# Patient Record
Sex: FEMALE | Race: BLACK OR AFRICAN AMERICAN | Employment: UNEMPLOYED | URBAN - METROPOLITAN AREA
[De-identification: names, ages, dates, MRNs, and addresses within clinical notes are randomized per-mention and may not be internally consistent; named-entity substitution may affect disease eponyms.]

---

## 2017-02-16 ENCOUNTER — HOSPITAL ENCOUNTER (OUTPATIENT)
Dept: LAB | Age: 82
Discharge: HOME | End: 2017-02-16
Payer: MEDICARE

## 2017-02-16 DIAGNOSIS — E78.5 HLD (HYPERLIPIDEMIA): ICD-10-CM

## 2017-02-16 DIAGNOSIS — E55.9 VITAMIN D DEFICIENCY: ICD-10-CM

## 2017-02-16 LAB
25(OH)D3 SERPL-MCNC: 10.2 NG/ML (ref 30–100)
ALBUMIN SERPL BCP-MCNC: 3.4 G/DL (ref 3.4–5)
ALBUMIN/GLOB SERPL: 0.8 {RATIO} (ref 0.8–1.7)
ALP SERPL-CCNC: 97 U/L (ref 45–117)
ALT SERPL-CCNC: 13 U/L (ref 13–56)
ANION GAP BLD CALC-SCNC: 7 MMOL/L (ref 3–18)
AST SERPL W P-5'-P-CCNC: 20 U/L (ref 15–37)
BASOPHILS # BLD AUTO: 0 K/UL (ref 0–0.1)
BASOPHILS # BLD: 1 % (ref 0–2)
BILIRUB SERPL-MCNC: 0.5 MG/DL (ref 0.2–1)
BUN SERPL-MCNC: 13 MG/DL (ref 7–18)
BUN/CREAT SERPL: 14 (ref 12–20)
CALCIUM SERPL-MCNC: 8.4 MG/DL (ref 8.5–10.1)
CHLORIDE SERPL-SCNC: 105 MMOL/L (ref 100–108)
CO2 SERPL-SCNC: 28 MMOL/L (ref 21–32)
CREAT SERPL-MCNC: 0.94 MG/DL (ref 0.6–1.3)
DIFFERENTIAL METHOD BLD: ABNORMAL
EOSINOPHIL # BLD: 0.2 K/UL (ref 0–0.4)
EOSINOPHIL NFR BLD: 8 % (ref 0–5)
ERYTHROCYTE [DISTWIDTH] IN BLOOD BY AUTOMATED COUNT: 15.1 % (ref 11.6–14.5)
GLOBULIN SER CALC-MCNC: 4.4 G/DL (ref 2–4)
GLUCOSE SERPL-MCNC: 77 MG/DL (ref 74–99)
HCT VFR BLD AUTO: 33.5 % (ref 35–45)
HGB BLD-MCNC: 11 G/DL (ref 12–16)
LYMPHOCYTES # BLD AUTO: 46 % (ref 21–52)
LYMPHOCYTES # BLD: 1.5 K/UL (ref 0.9–3.6)
MCH RBC QN AUTO: 29.3 PG (ref 24–34)
MCHC RBC AUTO-ENTMCNC: 32.8 G/DL (ref 31–37)
MCV RBC AUTO: 89.3 FL (ref 74–97)
MONOCYTES # BLD: 0.2 K/UL (ref 0.05–1.2)
MONOCYTES NFR BLD AUTO: 6 % (ref 3–10)
NEUTS SEG # BLD: 1.2 K/UL (ref 1.8–8)
NEUTS SEG NFR BLD AUTO: 39 % (ref 40–73)
PLATELET # BLD AUTO: 192 K/UL (ref 135–420)
PMV BLD AUTO: 11 FL (ref 9.2–11.8)
POTASSIUM SERPL-SCNC: 3.2 MMOL/L (ref 3.5–5.5)
PROT SERPL-MCNC: 7.8 G/DL (ref 6.4–8.2)
RBC # BLD AUTO: 3.75 M/UL (ref 4.2–5.3)
SODIUM SERPL-SCNC: 140 MMOL/L (ref 136–145)
TSH SERPL DL<=0.05 MIU/L-ACNC: 3.9 UIU/ML (ref 0.36–3.74)
WBC # BLD AUTO: 3.2 K/UL (ref 4.6–13.2)

## 2017-02-16 PROCEDURE — 85025 COMPLETE CBC W/AUTO DIFF WBC: CPT | Performed by: FAMILY MEDICINE

## 2017-02-16 PROCEDURE — 36415 COLL VENOUS BLD VENIPUNCTURE: CPT | Performed by: FAMILY MEDICINE

## 2017-02-16 PROCEDURE — 80053 COMPREHEN METABOLIC PANEL: CPT | Performed by: FAMILY MEDICINE

## 2017-02-16 PROCEDURE — 80061 LIPID PANEL: CPT | Performed by: FAMILY MEDICINE

## 2017-02-16 PROCEDURE — 82306 VITAMIN D 25 HYDROXY: CPT | Performed by: FAMILY MEDICINE

## 2017-02-16 PROCEDURE — 82172 ASSAY OF APOLIPOPROTEIN: CPT | Performed by: FAMILY MEDICINE

## 2017-02-16 PROCEDURE — 84443 ASSAY THYROID STIM HORMONE: CPT | Performed by: FAMILY MEDICINE

## 2017-02-19 LAB
APO B SERPL-MCNC: 130 MG/DL (ref 54–133)
CHOLEST SERPL-MCNC: 228 MG/DL (ref 100–199)
HDLC SERPL-MCNC: 79 MG/DL
LDLC SERPL CALC-MCNC: 124 MG/DL (ref 0–99)
LDLC/HDLC SERPL: 1.6 RATIO UNITS (ref 0–3.2)
NONHDLC SERPL-MCNC: 149 MG/DL (ref 0–129)
TRIGL SERPL-MCNC: 124 MG/DL (ref 0–149)

## 2017-03-01 ENCOUNTER — HOSPITAL ENCOUNTER (OUTPATIENT)
Dept: GENERAL RADIOLOGY | Age: 82
Discharge: HOME | End: 2017-03-01
Attending: INTERNAL MEDICINE
Payer: MEDICARE

## 2017-03-01 DIAGNOSIS — R10.9 ABDOMINAL CRAMPING: ICD-10-CM

## 2017-03-01 PROCEDURE — 74011000255 HC RX REV CODE- 255: Performed by: INTERNAL MEDICINE

## 2017-03-01 PROCEDURE — 74011000250 HC RX REV CODE- 250: Performed by: INTERNAL MEDICINE

## 2017-03-01 PROCEDURE — 74245 XR UPPER GI/SMALL BOWEL: CPT

## 2017-03-01 RX ADMIN — BARIUM SULFATE 100 ML: 980 POWDER, FOR SUSPENSION ORAL at 12:19

## 2017-03-01 RX ADMIN — ANTACID/ANTIFLATULENT 4 G: 380; 550; 10; 10 GRANULE, EFFERVESCENT ORAL at 12:19

## 2017-03-01 RX ADMIN — BARIUM SULFATE 125 G: 960 POWDER, FOR SUSPENSION ORAL at 12:20

## 2017-05-16 ENCOUNTER — APPOINTMENT (OUTPATIENT)
Dept: GENERAL RADIOLOGY | Age: 82
DRG: 391 | End: 2017-05-16
Attending: FAMILY MEDICINE
Payer: MEDICARE

## 2017-05-16 ENCOUNTER — APPOINTMENT (OUTPATIENT)
Dept: CT IMAGING | Age: 82
DRG: 391 | End: 2017-05-16
Attending: FAMILY MEDICINE
Payer: MEDICARE

## 2017-05-16 ENCOUNTER — HOSPITAL ENCOUNTER (INPATIENT)
Age: 82
LOS: 6 days | Discharge: NURSING, REHAB OR LONG-TERM CARE | DRG: 391 | End: 2017-05-22
Attending: EMERGENCY MEDICINE | Admitting: FAMILY MEDICINE
Payer: MEDICARE

## 2017-05-16 DIAGNOSIS — D72.819 LEUKOPENIA, UNSPECIFIED TYPE: Primary | ICD-10-CM

## 2017-05-16 DIAGNOSIS — R54 FRAILTY: Chronic | ICD-10-CM

## 2017-05-16 DIAGNOSIS — R41.82 ALTERED MENTAL STATUS, UNSPECIFIED: ICD-10-CM

## 2017-05-16 DIAGNOSIS — R10.13 ABDOMINAL PAIN, EPIGASTRIC: ICD-10-CM

## 2017-05-16 DIAGNOSIS — E87.6 HYPOKALEMIA: ICD-10-CM

## 2017-05-16 DIAGNOSIS — R10.9 ABDOMINAL PAIN, UNSPECIFIED LOCATION: ICD-10-CM

## 2017-05-16 DIAGNOSIS — N30.00 ACUTE CYSTITIS WITHOUT HEMATURIA: ICD-10-CM

## 2017-05-16 PROBLEM — N39.0 UTI (URINARY TRACT INFECTION): Status: ACTIVE | Noted: 2017-05-16

## 2017-05-16 LAB
ALBUMIN SERPL BCP-MCNC: 2.9 G/DL (ref 3.4–5)
ALBUMIN/GLOB SERPL: 0.7 {RATIO} (ref 0.8–1.7)
ALP SERPL-CCNC: 75 U/L (ref 45–117)
ALT SERPL-CCNC: 13 U/L (ref 13–56)
ANION GAP BLD CALC-SCNC: 3 MMOL/L (ref 3–18)
APPEARANCE UR: CLEAR
AST SERPL W P-5'-P-CCNC: 20 U/L (ref 15–37)
BACTERIA URNS QL MICRO: ABNORMAL /HPF
BASOPHILS # BLD AUTO: 0 K/UL (ref 0–0.1)
BASOPHILS # BLD: 0 % (ref 0–2)
BILIRUB SERPL-MCNC: 0.5 MG/DL (ref 0.2–1)
BILIRUB UR QL: NEGATIVE
BUN SERPL-MCNC: 9 MG/DL (ref 7–18)
BUN/CREAT SERPL: 13 (ref 12–20)
CALCIUM SERPL-MCNC: 8.3 MG/DL (ref 8.5–10.1)
CHLORIDE SERPL-SCNC: 108 MMOL/L (ref 100–108)
CK MB CFR SERPL CALC: 2.8 % (ref 0–4)
CK MB SERPL-MCNC: 4.4 NG/ML (ref 5–25)
CK SERPL-CCNC: 157 U/L (ref 26–192)
CO2 SERPL-SCNC: 30 MMOL/L (ref 21–32)
COLOR UR: YELLOW
CREAT SERPL-MCNC: 0.7 MG/DL (ref 0.6–1.3)
DIFFERENTIAL METHOD BLD: ABNORMAL
EOSINOPHIL # BLD: 0.2 K/UL (ref 0–0.4)
EOSINOPHIL NFR BLD: 6 % (ref 0–5)
EPITH CASTS URNS QL MICRO: ABNORMAL /LPF (ref 0–5)
ERYTHROCYTE [DISTWIDTH] IN BLOOD BY AUTOMATED COUNT: 15.6 % (ref 11.6–14.5)
GLOBULIN SER CALC-MCNC: 3.9 G/DL (ref 2–4)
GLUCOSE SERPL-MCNC: 92 MG/DL (ref 74–99)
GLUCOSE UR STRIP.AUTO-MCNC: NEGATIVE MG/DL
HCT VFR BLD AUTO: 29.1 % (ref 35–45)
HGB BLD-MCNC: 9.7 G/DL (ref 12–16)
HGB UR QL STRIP: NEGATIVE
KETONES UR QL STRIP.AUTO: NEGATIVE MG/DL
LEUKOCYTE ESTERASE UR QL STRIP.AUTO: ABNORMAL
LIPASE SERPL-CCNC: 134 U/L (ref 73–393)
LYMPHOCYTES # BLD AUTO: 48 % (ref 21–52)
LYMPHOCYTES # BLD: 1.4 K/UL (ref 0.9–3.6)
MCH RBC QN AUTO: 28.4 PG (ref 24–34)
MCHC RBC AUTO-ENTMCNC: 33.3 G/DL (ref 31–37)
MCV RBC AUTO: 85.3 FL (ref 74–97)
MONOCYTES # BLD: 0.3 K/UL (ref 0.05–1.2)
MONOCYTES NFR BLD AUTO: 11 % (ref 3–10)
NEUTS SEG # BLD: 1 K/UL (ref 1.8–8)
NEUTS SEG NFR BLD AUTO: 35 % (ref 40–73)
NITRITE UR QL STRIP.AUTO: NEGATIVE
PH UR STRIP: 6.5 [PH] (ref 5–8)
PLATELET # BLD AUTO: 187 K/UL (ref 135–420)
PMV BLD AUTO: 10.1 FL (ref 9.2–11.8)
POTASSIUM SERPL-SCNC: 3 MMOL/L (ref 3.5–5.5)
PROT SERPL-MCNC: 6.8 G/DL (ref 6.4–8.2)
PROT UR STRIP-MCNC: ABNORMAL MG/DL
RBC # BLD AUTO: 3.41 M/UL (ref 4.2–5.3)
RBC #/AREA URNS HPF: ABNORMAL /HPF (ref 0–5)
SODIUM SERPL-SCNC: 141 MMOL/L (ref 136–145)
SP GR UR REFRACTOMETRY: 1.01 (ref 1–1.03)
TROPONIN I SERPL-MCNC: <0.02 NG/ML (ref 0–0.04)
UROBILINOGEN UR QL STRIP.AUTO: 1 EU/DL (ref 0.2–1)
WBC # BLD AUTO: 2.9 K/UL (ref 4.6–13.2)
WBC URNS QL MICRO: ABNORMAL /HPF (ref 0–5)

## 2017-05-16 PROCEDURE — 87040 BLOOD CULTURE FOR BACTERIA: CPT | Performed by: FAMILY MEDICINE

## 2017-05-16 PROCEDURE — 99285 EMERGENCY DEPT VISIT HI MDM: CPT

## 2017-05-16 PROCEDURE — 96365 THER/PROPH/DIAG IV INF INIT: CPT

## 2017-05-16 PROCEDURE — 74011250636 HC RX REV CODE- 250/636: Performed by: FAMILY MEDICINE

## 2017-05-16 PROCEDURE — 74011636320 HC RX REV CODE- 636/320: Performed by: EMERGENCY MEDICINE

## 2017-05-16 PROCEDURE — 74177 CT ABD & PELVIS W/CONTRAST: CPT

## 2017-05-16 PROCEDURE — 36415 COLL VENOUS BLD VENIPUNCTURE: CPT | Performed by: FAMILY MEDICINE

## 2017-05-16 PROCEDURE — 74011000250 HC RX REV CODE- 250: Performed by: FAMILY MEDICINE

## 2017-05-16 PROCEDURE — 80053 COMPREHEN METABOLIC PANEL: CPT | Performed by: EMERGENCY MEDICINE

## 2017-05-16 PROCEDURE — 83690 ASSAY OF LIPASE: CPT | Performed by: EMERGENCY MEDICINE

## 2017-05-16 PROCEDURE — 85025 COMPLETE CBC W/AUTO DIFF WBC: CPT | Performed by: EMERGENCY MEDICINE

## 2017-05-16 PROCEDURE — 74011000258 HC RX REV CODE- 258: Performed by: EMERGENCY MEDICINE

## 2017-05-16 PROCEDURE — 81001 URINALYSIS AUTO W/SCOPE: CPT | Performed by: FAMILY MEDICINE

## 2017-05-16 PROCEDURE — 96361 HYDRATE IV INFUSION ADD-ON: CPT

## 2017-05-16 PROCEDURE — 74011250636 HC RX REV CODE- 250/636: Performed by: EMERGENCY MEDICINE

## 2017-05-16 PROCEDURE — 82550 ASSAY OF CK (CPK): CPT | Performed by: FAMILY MEDICINE

## 2017-05-16 PROCEDURE — 96375 TX/PRO/DX INJ NEW DRUG ADDON: CPT

## 2017-05-16 PROCEDURE — 74011250637 HC RX REV CODE- 250/637: Performed by: EMERGENCY MEDICINE

## 2017-05-16 PROCEDURE — 65270000029 HC RM PRIVATE

## 2017-05-16 PROCEDURE — 71010 XR CHEST SNGL V: CPT

## 2017-05-16 RX ORDER — CEFTRIAXONE 1 G/1
1 INJECTION, POWDER, FOR SOLUTION INTRAMUSCULAR; INTRAVENOUS
Status: DISCONTINUED | OUTPATIENT
Start: 2017-05-16 | End: 2017-05-16 | Stop reason: CLARIF

## 2017-05-16 RX ORDER — POTASSIUM CHLORIDE AND SODIUM CHLORIDE 450; 150 MG/100ML; MG/100ML
INJECTION, SOLUTION INTRAVENOUS CONTINUOUS
Status: DISCONTINUED | OUTPATIENT
Start: 2017-05-16 | End: 2017-05-18

## 2017-05-16 RX ORDER — MORPHINE SULFATE 2 MG/ML
2 INJECTION, SOLUTION INTRAMUSCULAR; INTRAVENOUS
Status: DISCONTINUED | OUTPATIENT
Start: 2017-05-16 | End: 2017-05-19

## 2017-05-16 RX ORDER — FAMOTIDINE 20 MG/50ML
20 INJECTION, SOLUTION INTRAVENOUS EVERY 12 HOURS
Status: DISCONTINUED | OUTPATIENT
Start: 2017-05-16 | End: 2017-05-16

## 2017-05-16 RX ORDER — MORPHINE SULFATE 2 MG/ML
2 INJECTION, SOLUTION INTRAMUSCULAR; INTRAVENOUS
Status: COMPLETED | OUTPATIENT
Start: 2017-05-16 | End: 2017-05-16

## 2017-05-16 RX ORDER — POTASSIUM CHLORIDE 20 MEQ/1
40 TABLET, EXTENDED RELEASE ORAL
Status: COMPLETED | OUTPATIENT
Start: 2017-05-16 | End: 2017-05-16

## 2017-05-16 RX ORDER — CEFTRIAXONE 1 G/1
1 INJECTION, POWDER, FOR SOLUTION INTRAMUSCULAR; INTRAVENOUS EVERY 24 HOURS
Status: DISCONTINUED | OUTPATIENT
Start: 2017-05-16 | End: 2017-05-16

## 2017-05-16 RX ORDER — HYDRALAZINE HYDROCHLORIDE 20 MG/ML
10 INJECTION INTRAMUSCULAR; INTRAVENOUS
Status: DISCONTINUED | OUTPATIENT
Start: 2017-05-16 | End: 2017-05-22 | Stop reason: HOSPADM

## 2017-05-16 RX ORDER — ONDANSETRON 2 MG/ML
4 INJECTION INTRAMUSCULAR; INTRAVENOUS
Status: COMPLETED | OUTPATIENT
Start: 2017-05-16 | End: 2017-05-16

## 2017-05-16 RX ADMIN — CEFTRIAXONE SODIUM 1 G: 1 INJECTION, POWDER, FOR SOLUTION INTRAMUSCULAR; INTRAVENOUS at 14:39

## 2017-05-16 RX ADMIN — ONDANSETRON 4 MG: 2 INJECTION INTRAMUSCULAR; INTRAVENOUS at 15:04

## 2017-05-16 RX ADMIN — SODIUM CHLORIDE 1000 ML: 900 INJECTION, SOLUTION INTRAVENOUS at 12:03

## 2017-05-16 RX ADMIN — CEFTRIAXONE 1 G: 1 INJECTION, POWDER, FOR SOLUTION INTRAMUSCULAR; INTRAVENOUS at 14:39

## 2017-05-16 RX ADMIN — SODIUM CHLORIDE AND POTASSIUM CHLORIDE: 4.5; 1.49 INJECTION, SOLUTION INTRAVENOUS at 23:16

## 2017-05-16 RX ADMIN — IOPAMIDOL 100 ML: 612 INJECTION, SOLUTION INTRAVENOUS at 20:32

## 2017-05-16 RX ADMIN — ONDANSETRON 4 MG: 2 SOLUTION INTRAMUSCULAR; INTRAVENOUS at 15:12

## 2017-05-16 RX ADMIN — FAMOTIDINE 20 MG: 10 INJECTION, SOLUTION INTRAVENOUS at 23:16

## 2017-05-16 RX ADMIN — POTASSIUM CHLORIDE 40 MEQ: 20 TABLET, EXTENDED RELEASE ORAL at 15:12

## 2017-05-16 RX ADMIN — Medication 2 MG: at 15:17

## 2017-05-16 NOTE — IP AVS SNAPSHOT
Current Discharge Medication List  
  
START taking these medications Dose & Instructions Dispensing Information Comments Morning Noon Evening Bedtime  
 bisacodyl 10 mg suppository Commonly known as:  DULCOLAX Your last dose was: Your next dose is:    
   
   
 Dose:  10 mg Insert 10 mg into rectum daily as needed. Quantity:  30 Suppository Refills:  0  
     
   
   
   
  
 dicyclomine 10 mg/5 mL Soln oral solution Commonly known as:  BENTYL Your last dose was: Your next dose is:    
   
   
 Dose:  10 mg Take 5 mL by mouth four (4) times daily. Quantity:  473 mL Refills:  0  
     
   
   
   
  
 docusate sodium 100 mg capsule Commonly known as:  Ramone Bingham Your last dose was: Your next dose is:    
   
   
 Dose:  100 mg Take 1 Cap by mouth two (2) times a day for 90 days. Hold for diarrhea Quantity:  60 Cap Refills:  0  
     
   
   
   
  
 erythromycin base 250 mg tablet Commonly known as:  E-MYCIN Your last dose was: Your next dose is:    
   
   
 Dose:  250 mg Take 1 Tab by mouth four (4) times daily. Quantity:  90 Tab Refills:  0  
     
   
   
   
  
 famotidine 20 mg tablet Commonly known as:  PEPCID Your last dose was: Your next dose is:    
   
   
 Dose:  20 mg Take 1 Tab by mouth daily. Quantity:  30 Tab Refills:  0 HYDROcodone-acetaminophen 5-325 mg per tablet Commonly known as:  Ambreen Dakconrad Your last dose was: Your next dose is:    
   
   
 Dose:  1 Tab Take 1 Tab by mouth every four (4) hours as needed. Max Daily Amount: 6 Tabs. Quantity:  40 Tab Refills:  0  
     
   
   
   
  
 levothyroxine 25 mcg tablet Commonly known as:  SYNTHROID Your last dose was: Your next dose is:    
   
   
 Dose:  25 mcg Take 1 Tab by mouth Daily (before breakfast). Quantity:  30 Tab Refills:  0 LORazepam 0.5 mg tablet Commonly known as:  ATIVAN Your last dose was: Your next dose is:    
   
   
 Dose:  0.5 mg Take 1 Tab by mouth three (3) times daily as needed. Max Daily Amount: 1.5 mg.  
 Quantity:  90 Tab Refills:  0  
     
   
   
   
  
 magnesium hydroxide 400 mg/5 mL suspension Commonly known as:  MILK OF MAGNESIA Your last dose was: Your next dose is:    
   
   
 Dose:  30 mL Take 30 mL by mouth daily as needed for Constipation. Quantity:  1 Bottle Refills:  0  
     
   
   
   
  
 ondansetron 4 mg disintegrating tablet Commonly known as:  ZOFRAN ODT Your last dose was: Your next dose is:    
   
   
 Dose:  4 mg Take 1 Tab by mouth every eight (8) hours as needed. Quantity:  90 Tab Refills:  0  
     
   
   
   
  
 polyethylene glycol 17 gram packet Commonly known as:  Anthony Spotted Your last dose was: Your next dose is:    
   
   
 Dose:  17 g Take 1 Packet by mouth daily. Quantity:  30 Packet Refills:  0  
     
   
   
   
  
 risperiDONE 0.25 mg tablet Commonly known as:  RisperDAL Your last dose was: Your next dose is:    
   
   
 Dose:  0.25 mg Take 1 Tab by mouth nightly. Quantity:  30 Tab Refills:  0 CONTINUE these medications which have NOT CHANGED Dose & Instructions Dispensing Information Comments Morning Noon Evening Bedtime  
 nitroglycerin 0.4 mg SL tablet Commonly known as:  NITROSTAT Your last dose was: Your next dose is:    
   
   
 Dose:  0.4 mg  
1 Tab by SubLINGual route every five (5) minutes as needed for Chest Pain for 3 doses. Quantity:  25 Tab Refills:  0 STOP taking these medications   
 furosemide 40 mg tablet Commonly known as:  LASIX  
   
  
 potassium chloride 20 mEq tablet Commonly known as:  K-DURKIMANIOR-CON  
   
  
 simvastatin 20 mg tablet Commonly known as:  ZOCOR  
   
  
 traMADol 50 mg tablet Commonly known as:  ULTRAM  
   
  
  
  
Where to Get Your Medications These medications were sent to [facility] [place], [address]  [address] , [address] [address] Phone:  [phone]  
  bisacodyl 10 mg suppository  
 dicyclomine 10 mg/5 mL Soln oral solution  
 docusate sodium 100 mg capsule  
 erythromycin base 250 mg tablet  
 famotidine 20 mg tablet  
 levothyroxine 25 mcg tablet  
 magnesium hydroxide 400 mg/5 mL suspension  
 ondansetron 4 mg disintegrating tablet  
 polyethylene glycol 17 gram packet  
 risperiDONE 0.25 mg tablet Information on where to get these meds will be given to you by the nurse or doctor. ! Ask your nurse or doctor about these medications HYDROcodone-acetaminophen 5-325 mg per tablet LORazepam 0.5 mg tablet

## 2017-05-16 NOTE — ED TRIAGE NOTES
Patient came into ER complaining of back pain and urinary incontinence, her primary Nurse caregiver , states patient urine is darker then usual and she has not want to drink water, just sodas and states when she gets this confusion it is a UTI. Patient also c/o right shoulder pain that is chronic.

## 2017-05-16 NOTE — IP AVS SNAPSHOT
Bianka Arroyo 
 
 
 [address] Patient: Diomedes Levine MRN: [id number] You are allergic to the following Allergen Reactions Ciprofloxacin Itching Recent Documentation Height Weight Breastfeeding? BMI Smoking Status 1.549 m 56.7 kg No 23.64 kg/m2 Former Smoker Emergency Contacts Name Discharge Info Relation Home Work Mobile Gabriela Mars 3142 CAREGIVER [3] Other Relative [6] [id number] Dalila Carrillo  Other Relative [6] [phone] About your hospitalization You were admitted on:  May 16, 2017 You last received care in the:  [facility] [address] You were discharged on:  May 22, 2017 Unit phone number:  [phone] Why you were hospitalized Your primary diagnosis was:  Not on File Your diagnoses also included:  Hypokalemia, Leukopenia, Uti (Urinary Tract Infection), Abdominal Pain, Abnormal Ct Of The Abdomen, Altered Mental Status, Unspecified, Acute Encephalopathy, Dementia Providers Seen During Your Hospitalizations Provider Role Specialty Primary office phone Ayush Augustin DO Attending Provider Emergency Medicine [phone] Payam Chavez MD Attending Provider General acute hospital [phone] Your Primary Care Physician (PCP) Primary Care Physician Office Phone Office Fax [phone] Desai,[facility], [address] [facility] [phone] Follow-up Information Follow up With Details Comments Contact Info Patient will be discharging to a [facility] or [employer]. Current Discharge Medication List  
  
START taking these medications Dose & Instructions Dispensing Information Comments Morning Noon Evening Bedtime  
 bisacodyl 10 mg suppository Commonly known as:  DULCOLAX Your last dose was: Your next dose is: Dose:  10 mg Insert 10 mg into rectum daily as needed. Quantity:  30 Suppository Refills:  0  
     
   
   
   
  
 dicyclomine 10 mg/5 mL Soln oral solution Commonly known as:  BENTYL Your last dose was: Your next dose is:    
   
   
 Dose:  10 mg Take 5 mL by mouth four (4) times daily. Quantity:  473 mL Refills:  0  
     
   
   
   
  
 docusate sodium 100 mg capsule Commonly known as:  Mikhail Stallion Your last dose was: Your next dose is:    
   
   
 Dose:  100 mg Take 1 Cap by mouth two (2) times a day for 90 days. Hold for diarrhea Quantity:  60 Cap Refills:  0  
     
   
   
   
  
 erythromycin base 250 mg tablet Commonly known as:  E-MYCIN Your last dose was: Your next dose is:    
   
   
 Dose:  250 mg Take 1 Tab by mouth four (4) times daily. Quantity:  90 Tab Refills:  0  
     
   
   
   
  
 famotidine 20 mg tablet Commonly known as:  PEPCID Your last dose was: Your next dose is:    
   
   
 Dose:  20 mg Take 1 Tab by mouth daily. Quantity:  30 Tab Refills:  0 HYDROcodone-acetaminophen 5-325 mg per tablet Commonly known as:  [place] Hurt Your last dose was: Your next dose is:    
   
   
 Dose:  1 Tab Take 1 Tab by mouth every four (4) hours as needed. Max Daily Amount: 6 Tabs. Quantity:  40 Tab Refills:  0  
     
   
   
   
  
 levothyroxine 25 mcg tablet Commonly known as:  SYNTHROID Your last dose was: Your next dose is:    
   
   
 Dose:  25 mcg Take 1 Tab by mouth Daily (before breakfast). Quantity:  30 Tab Refills:  0 LORazepam 0.5 mg tablet Commonly known as:  ATIVAN Your last dose was: Your next dose is:    
   
   
 Dose:  0.5 mg Take 1 Tab by mouth three (3) times daily as needed. Max Daily Amount: 1.5 mg.  
 Quantity:  90 Tab Refills:  0 magnesium hydroxide 400 mg/5 mL suspension Commonly known as:  MILK OF MAGNESIA Your last dose was: Your next dose is:    
   
   
 Dose:  30 mL Take 30 mL by mouth daily as needed for Constipation. Quantity:  1 Bottle Refills:  0  
     
   
   
   
  
 ondansetron 4 mg disintegrating tablet Commonly known as:  ZOFRAN ODT Your last dose was: Your next dose is:    
   
   
 Dose:  4 mg Take 1 Tab by mouth every eight (8) hours as needed. Quantity:  90 Tab Refills:  0  
     
   
   
   
  
 polyethylene glycol 17 gram packet Commonly known as:  Baylee Marrow Your last dose was: Your next dose is:    
   
   
 Dose:  17 g Take 1 Packet by mouth daily. Quantity:  30 Packet Refills:  0  
     
   
   
   
  
 risperiDONE 0.25 mg tablet Commonly known as:  RisperDAL Your last dose was: Your next dose is:    
   
   
 Dose:  0.25 mg Take 1 Tab by mouth nightly. Quantity:  30 Tab Refills:  0 CONTINUE these medications which have NOT CHANGED Dose & Instructions Dispensing Information Comments Morning Noon Evening Bedtime  
 nitroglycerin 0.4 mg SL tablet Commonly known as:  NITROSTAT Your last dose was: Your next dose is:    
   
   
 Dose:  0.4 mg  
1 Tab by SubLINGual route every five (5) minutes as needed for Chest Pain for 3 doses. Quantity:  25 Tab Refills:  0 STOP taking these medications   
 furosemide 40 mg tablet Commonly known as:  LASIX  
   
  
 potassium chloride 20 mEq tablet Commonly known as:  K-DUR, KLOR-CON  
   
  
 simvastatin 20 mg tablet Commonly known as:  ZOCOR  
   
  
 traMADol 50 mg tablet Commonly known as:  ULTRAM  
   
  
  
  
Where to Get Your Medications These medications were sent to [facility] [place], [address]  [address] , [address] [place] [address] Phone:  [phone] bisacodyl 10 mg suppository  
 dicyclomine 10 mg/5 mL Soln oral solution  
 docusate sodium 100 mg capsule  
 erythromycin base 250 mg tablet  
 famotidine 20 mg tablet  
 levothyroxine 25 mcg tablet  
 magnesium hydroxide 400 mg/5 mL suspension  
 ondansetron 4 mg disintegrating tablet  
 polyethylene glycol 17 gram packet  
 risperiDONE 0.25 mg tablet Information on where to get these meds will be given to you by the nurse or doctor. ! Ask your nurse or doctor about these medications HYDROcodone-acetaminophen 5-325 mg per tablet LORazepam 0.5 mg tablet Discharge Instructions Abdominal Pain: Care Instructions Your Care Instructions Abdominal pain has many possible causes. Some aren't serious and get better on their own in a few days. Others need more testing and treatment. If your pain continues or gets worse, you need to be rechecked and may need more tests to find out what is wrong. You may need surgery to correct the problem. Don't ignore new symptoms, such as fever, nausea and vomiting, urination problems, pain that gets worse, and dizziness. These may be signs of a more serious problem. Your doctor may have recommended a follow-up visit in the next 8 to 12 hours. If you are not getting better, you may need more tests or treatment. The doctor has checked you carefully, but problems can develop later. If you notice any problems or new symptoms, get medical treatment right away. Follow-up care is a key part of your treatment and safety. Be sure to make and go to all appointments, and call your doctor if you are having problems. It's also a good idea to know your test results and keep a list of the medicines you take. How can you care for yourself at home? · Rest until you feel better. · To prevent dehydration, drink plenty of fluids, enough so that your urine is light yellow or clear like water.  Choose water and other caffeine-free clear liquids until you feel better. If you have kidney, heart, or liver disease and have to limit fluids, talk with your doctor before you increase the amount of fluids you drink. · If your stomach is upset, eat mild foods, such as rice, dry toast or crackers, bananas, and applesauce. Try eating several small meals instead of two or three large ones. · Wait until 48 hours after all symptoms have gone away before you have spicy foods, alcohol, and drinks that contain caffeine. · Do not eat foods that are high in fat. · Avoid anti-inflammatory medicines such as aspirin, ibuprofen (Advil, Motrin), and naproxen (Aleve). These can cause stomach upset. Talk to your doctor if you take daily aspirin for another health problem. When should you call for help? Call 911 anytime you think you may need emergency care. For example, call if: 
· You passed out (lost consciousness). · You pass maroon or very bloody stools. · You vomit blood or what looks like coffee grounds. · You have new, severe belly pain. Call your doctor now or seek immediate medical care if: 
· Your pain gets worse, especially if it becomes focused in one area of your belly. · You have a new or higher fever. · Your stools are black and look like tar, or they have streaks of blood. · You have unexpected vaginal bleeding. · You have symptoms of a urinary tract infection. These may include: 
¨ Pain when you urinate. ¨ Urinating more often than usual. 
¨ Blood in your urine. · You are dizzy or lightheaded, or you feel like you may faint. Watch closely for changes in your health, and be sure to contact your doctor if: 
· You are not getting better after 1 day (24 hours). Where can you learn more? Go to http://abner-gabino.info/. Enter H001 in the search box to learn more about \"Abdominal Pain: Care Instructions. \" Current as of: May 27, 2016 Content Version: 11.2 © 0861-5423 Healthwise, Incorporated. Care instructions adapted under license by [employer] (which disclaims liability or warranty for this information). If you have questions about a medical condition or this instruction, always ask your healthcare professional. [address] any warranty or liability for your use of this information. Alzheimer's Disease: Care Instructions Your Care Instructions Alzheimer's disease is a type of dementia. It causes memory loss and affects judgment, language, and behavior. You may have trouble making decisions or may get lost in places that you used to know well. Alzheimer's disease is different than mild memory loss that occurs with aging. It is not clear what causes Alzheimer's disease, but it is the most common form of dementia in older adults. Finding out that you have this disease is a shock. You may be afraid and worried about how the condition will change your life. Although there is no cure at this time, medicine in some cases may slow memory loss for a while. Other medicines may be able to help you sleep or cope with depression and behavior changes. Alzheimer's disease is different for everyone. It may take many years to develop. In some cases, people can function well for a long time. In the early stage of the disease, you can do things at home to make life easier and safer. You also can keep doing your hobbies and other activities. Many people find comfort in planning now for their future needs. Follow-up care is a key part of your treatment and safety. Be sure to make and go to all appointments, and call your doctor if you are having problems. Its also a good idea to know your test results and keep a list of the medicines you take. How can you care for yourself at home? Taking care of yourself · If your doctor gives you medicines, take them exactly as prescribed. Call your doctor if you think you are having a problem with your medicine. You will get more details on the medicines your doctor prescribes. · Eat a balanced diet. Get plenty of whole grains, fruits, and vegetables every day. If you are not hungry at mealtimes, eat snacks at midmorning and in the afternoon. Try drinks such as Boost, Ensure, or Sustacal if you are having trouble keeping your weight up. · Stay active. Exercise such as walking may slow the decline of your mental abilities. Try to stay active mentally too. Read and work crossword puzzles if you enjoy these activities. · If you have trouble sleeping, do not nap during the day. Get regular exercise (but not within several hours of bedtime). Drink a glass of warm milk or caffeine-free herbal tea before going to bed. · Ask your doctor about support groups and other resources in your area. They can help people who have Alzheimer's disease and their families. · Be patient. You may find that a task takes you longer than it used to. · If you have not already done so, make a list of advance directives. Advance directives are instructions to your doctor and family members about what kind of care you want if you become unable to speak or express yourself. Talk to a  about making a will, if you do not already have one. Keeping schedules · Develop a routine. You will feel less frustrated or confused if you have a clear, simple plan of what to do every day. ¨ Make lists of your medicines and when to take them. ¨ Write down appointments and other tasks in a calendar. ¨ Put sticky notes around the house to help you remember events and other things you have to do. ¨ Schedule activities and tasks for times of the day when you are best able to handle them. Staying safe · Tell someone when you are going out and where you are going. Let the person know when you will be back.  Before you go out alone, write down where you are going, how to get there, and how to get back home. Do this even if you have gone there many times before. Take someone along with you when possible. · Make your home safe. Tack down rugs, put no-slip tape in the tub, use handrails, and put safety switches on stoves and appliances. · Have a family member or other caregiver tell you whether you are driving badly. Deciding to stop driving is very hard for many people. Driving helps you feel independent. Your state s license bureau can do a driving test if there is any question. Plan for other means of getting around when you are no longer able to drive. · Use strong lighting, especially at night. Put night-lights in bedrooms, hallways, and bathrooms. · Lower the hot water temperature setting to 120°F or lower to avoid burns. When should you call for help? Call 911 anytime you think you may need emergency care. For example, call if: 
· You are lost and do not know whom to call. · You are injured and do not know whom to call. Call your doctor now or seek immediate medical care if: 
· Your symptoms suddenly get much worse. Watch closely for changes in your health, and be sure to contact your doctor if: 
· You want more information about how you can take care of yourself. Where can you learn more? Go to http://abner-gabino.info/. Enter Y179 in the search box to learn more about \"Alzheimer's Disease: Care Instructions. \" Current as of: July 26, 2016 Content Version: 11.2 © 3458-7987 [employer]orporated. Care instructions adapted under license by [employer] (which disclaims liability or warranty for this information). If you have questions about a medical condition or this instruction, always ask your healthcare professional. [address] any warranty or liability for your use of this information. Altered Mental Status: Care Instructions Your Care Instructions Altered mental status is a change in how well your brain is working. As a result, you may be confused, be less alert than usual, or act in odd ways. This may include seeing or hearing things that aren't really there (hallucinations). A mental status change has many possible causes. For example, it may be the result of an infection, an imbalance of chemicals in the body, or a chronic disease such as diabetes or COPD. It can also be caused by things such as a head injury, taking certain medicines, or using alcohol or drugs. The doctor may do tests to look for the cause. These tests may include urine tests, blood tests, and imaging tests such as a CT scan. Sometimes a clear cause isn't found. But tests can help the doctor rule out a serious cause of your symptoms. A change in mental status can be scary. But mental status will often return to normal when the cause is treated. So it is important to get any follow-up testing or treatment the doctor has suggested. The doctor has checked you carefully, but problems can develop later. If you notice any problems or new symptoms, get medical treatment right away. Follow-up care is a key part of your treatment and safety. Be sure to make and go to all appointments, and call your doctor if you are having problems. It's also a good idea to know your test results and keep a list of the medicines you take. How can you care for yourself at home? · Be safe with medicines. Take your medicines exactly as prescribed. Call your doctor if you think you are having a problem with your medicine. · Have another adult stay with you until you are better. This can help keep you safe. Ask that person to watch for signs that your mental status is getting worse. When should you call for help? Call 911 anytime you think you may need emergency care. For example, call if: 
· You passed out (lost consciousness). Call your doctor now or seek immediate medical care if: · Your mental status is getting worse. · You have new symptoms, such as a fever, chills, or shortness of breath. · You do not feel safe. Watch closely for changes in your health, and be sure to contact your doctor if: 
· You do not get better as expected. Where can you learn more? Go to LinPrim.be Enter S084 in the search box to learn more about \"Altered Mental Status: Care Instructions. \"  
© 2532-3762 Healthwise, Incorporated. Care instructions adapted under license by Blanca Chao (which disclaims liability or warranty for this information). This care instruction is for use with your licensed healthcare professional. If you have questions about a medical condition or this instruction, always ask your healthcare professional. [address] any warranty or liability for your use of this information. Content Version: 26.4.199416; Current as of: November 20, 2015 Discharge Orders None [facility] Announcement We are excited to announce that we are making your provider's discharge notes available to you in Volt. You will see these notes when they are completed and signed by the physician that discharged you from your recent hospital stay. If you have any questions or concerns about any information you see in [facility], please call the Health Information Department where you were seen or reach out to your Primary Care Provider for more information about your plan of care. General Information Please provide this summary of care documentation to your next provider. Patient Signature:  ____________________________________________________________ Date:  ____________________________________________________________  
  
Toya Santacruz Provider Signature:  ____________________________________________________________ Date:  ____________________________________________________________

## 2017-05-17 PROBLEM — R93.5 ABNORMAL CT OF THE ABDOMEN: Status: ACTIVE | Noted: 2017-05-17

## 2017-05-17 LAB
ALBUMIN SERPL BCP-MCNC: 3 G/DL (ref 3.4–5)
ALBUMIN/GLOB SERPL: 0.7 {RATIO} (ref 0.8–1.7)
ALP SERPL-CCNC: 82 U/L (ref 45–117)
ALT SERPL-CCNC: 13 U/L (ref 13–56)
ANION GAP BLD CALC-SCNC: 8 MMOL/L (ref 3–18)
AST SERPL W P-5'-P-CCNC: 17 U/L (ref 15–37)
BASOPHILS # BLD AUTO: 0 K/UL (ref 0–0.1)
BASOPHILS # BLD: 1 % (ref 0–2)
BILIRUB SERPL-MCNC: 0.2 MG/DL (ref 0.2–1)
BUN SERPL-MCNC: 10 MG/DL (ref 7–18)
BUN/CREAT SERPL: 10 (ref 12–20)
CALCIUM SERPL-MCNC: 8.5 MG/DL (ref 8.5–10.1)
CHLORIDE SERPL-SCNC: 102 MMOL/L (ref 100–108)
CK MB CFR SERPL CALC: 2.6 % (ref 0–4)
CK MB CFR SERPL CALC: 2.8 % (ref 0–4)
CK MB SERPL-MCNC: 3.7 NG/ML (ref 5–25)
CK MB SERPL-MCNC: 3.8 NG/ML (ref 5–25)
CK SERPL-CCNC: 137 U/L (ref 26–192)
CK SERPL-CCNC: 140 U/L (ref 26–192)
CO2 SERPL-SCNC: 30 MMOL/L (ref 21–32)
CREAT SERPL-MCNC: 1.03 MG/DL (ref 0.6–1.3)
DIFFERENTIAL METHOD BLD: ABNORMAL
EOSINOPHIL # BLD: 0.3 K/UL (ref 0–0.4)
EOSINOPHIL NFR BLD: 6 % (ref 0–5)
ERYTHROCYTE [DISTWIDTH] IN BLOOD BY AUTOMATED COUNT: 16.1 % (ref 11.6–14.5)
GLOBULIN SER CALC-MCNC: 4.3 G/DL (ref 2–4)
GLUCOSE SERPL-MCNC: 124 MG/DL (ref 74–99)
HCT VFR BLD AUTO: 33.7 % (ref 35–45)
HGB BLD-MCNC: 11.1 G/DL (ref 12–16)
LYMPHOCYTES # BLD AUTO: 32 % (ref 21–52)
LYMPHOCYTES # BLD: 1.7 K/UL (ref 0.9–3.6)
MAGNESIUM SERPL-MCNC: 1.7 MG/DL (ref 1.6–2.6)
MCH RBC QN AUTO: 28.7 PG (ref 24–34)
MCHC RBC AUTO-ENTMCNC: 32.9 G/DL (ref 31–37)
MCV RBC AUTO: 87.1 FL (ref 74–97)
MONOCYTES # BLD: 0.4 K/UL (ref 0.05–1.2)
MONOCYTES NFR BLD AUTO: 8 % (ref 3–10)
NEUTS SEG # BLD: 2.7 K/UL (ref 1.8–8)
NEUTS SEG NFR BLD AUTO: 53 % (ref 40–73)
PLATELET # BLD AUTO: 194 K/UL (ref 135–420)
PMV BLD AUTO: 9.8 FL (ref 9.2–11.8)
POTASSIUM SERPL-SCNC: 2.9 MMOL/L (ref 3.5–5.5)
PROT SERPL-MCNC: 7.3 G/DL (ref 6.4–8.2)
RBC # BLD AUTO: 3.87 M/UL (ref 4.2–5.3)
SODIUM SERPL-SCNC: 140 MMOL/L (ref 136–145)
TROPONIN I SERPL-MCNC: <0.02 NG/ML (ref 0–0.04)
TROPONIN I SERPL-MCNC: <0.02 NG/ML (ref 0–0.04)
TSH SERPL DL<=0.05 MIU/L-ACNC: 6.89 UIU/ML (ref 0.36–3.74)
WBC # BLD AUTO: 5.2 K/UL (ref 4.6–13.2)

## 2017-05-17 PROCEDURE — 74011250637 HC RX REV CODE- 250/637: Performed by: FAMILY MEDICINE

## 2017-05-17 PROCEDURE — 83735 ASSAY OF MAGNESIUM: CPT | Performed by: FAMILY MEDICINE

## 2017-05-17 PROCEDURE — 82553 CREATINE MB FRACTION: CPT | Performed by: FAMILY MEDICINE

## 2017-05-17 PROCEDURE — 84443 ASSAY THYROID STIM HORMONE: CPT | Performed by: FAMILY MEDICINE

## 2017-05-17 PROCEDURE — 85025 COMPLETE CBC W/AUTO DIFF WBC: CPT | Performed by: FAMILY MEDICINE

## 2017-05-17 PROCEDURE — 36415 COLL VENOUS BLD VENIPUNCTURE: CPT | Performed by: FAMILY MEDICINE

## 2017-05-17 PROCEDURE — G8998 SWALLOW D/C STATUS: HCPCS

## 2017-05-17 PROCEDURE — 74011250636 HC RX REV CODE- 250/636: Performed by: FAMILY MEDICINE

## 2017-05-17 PROCEDURE — 74011000258 HC RX REV CODE- 258: Performed by: FAMILY MEDICINE

## 2017-05-17 PROCEDURE — 80053 COMPREHEN METABOLIC PANEL: CPT | Performed by: FAMILY MEDICINE

## 2017-05-17 PROCEDURE — G8997 SWALLOW GOAL STATUS: HCPCS

## 2017-05-17 PROCEDURE — 76450000000

## 2017-05-17 PROCEDURE — 92610 EVALUATE SWALLOWING FUNCTION: CPT

## 2017-05-17 PROCEDURE — G8996 SWALLOW CURRENT STATUS: HCPCS

## 2017-05-17 PROCEDURE — 84484 ASSAY OF TROPONIN QUANT: CPT | Performed by: FAMILY MEDICINE

## 2017-05-17 PROCEDURE — 82550 ASSAY OF CK (CPK): CPT | Performed by: FAMILY MEDICINE

## 2017-05-17 PROCEDURE — 74011000250 HC RX REV CODE- 250: Performed by: FAMILY MEDICINE

## 2017-05-17 PROCEDURE — 65270000029 HC RM PRIVATE

## 2017-05-17 RX ORDER — METOCLOPRAMIDE 5 MG/1
5 TABLET ORAL
Status: DISCONTINUED | OUTPATIENT
Start: 2017-05-17 | End: 2017-05-18

## 2017-05-17 RX ORDER — POTASSIUM CHLORIDE 20 MEQ/1
40 TABLET, EXTENDED RELEASE ORAL
Status: COMPLETED | OUTPATIENT
Start: 2017-05-17 | End: 2017-05-17

## 2017-05-17 RX ORDER — ADHESIVE BANDAGE
30 BANDAGE TOPICAL DAILY PRN
Status: DISCONTINUED | OUTPATIENT
Start: 2017-05-17 | End: 2017-05-22 | Stop reason: HOSPADM

## 2017-05-17 RX ORDER — LORAZEPAM 2 MG/ML
0.5 INJECTION INTRAMUSCULAR
Status: DISCONTINUED | OUTPATIENT
Start: 2017-05-17 | End: 2017-05-19

## 2017-05-17 RX ADMIN — POTASSIUM CHLORIDE 40 MEQ: 20 TABLET, EXTENDED RELEASE ORAL at 07:10

## 2017-05-17 RX ADMIN — CEFTRIAXONE SODIUM 1 G: 1 INJECTION, POWDER, FOR SOLUTION INTRAMUSCULAR; INTRAVENOUS at 14:38

## 2017-05-17 RX ADMIN — METOCLOPRAMIDE HYDROCHLORIDE 5 MG: 5 TABLET ORAL at 09:37

## 2017-05-17 RX ADMIN — LORAZEPAM 0.5 MG: 2 INJECTION INTRAMUSCULAR; INTRAVENOUS at 13:14

## 2017-05-17 RX ADMIN — FAMOTIDINE 20 MG: 10 INJECTION, SOLUTION INTRAVENOUS at 08:21

## 2017-05-17 RX ADMIN — Medication 2 MG: at 23:32

## 2017-05-17 RX ADMIN — METOCLOPRAMIDE HYDROCHLORIDE 5 MG: 5 TABLET ORAL at 13:14

## 2017-05-17 RX ADMIN — LORAZEPAM 0.5 MG: 2 INJECTION INTRAMUSCULAR; INTRAVENOUS at 23:32

## 2017-05-17 RX ADMIN — SODIUM CHLORIDE AND POTASSIUM CHLORIDE: 4.5; 1.49 INJECTION, SOLUTION INTRAVENOUS at 16:59

## 2017-05-17 RX ADMIN — NALOXEGOL OXALATE 12.5 MG: 12.5 TABLET, FILM COATED ORAL at 09:37

## 2017-05-17 NOTE — CONSULTS
[address]    Name:  Michelle Cabrera  MR#:  [id number]  :  1917  Account #:  [de-identified]  Date of Adm:  2017  Date of Consultation:  2017      REASON FOR CONSULTATION: Advise opinion regarding abnormal  CT scan. HISTORY OF PRESENT ILLNESS: The patient is a 75-year-old  female who had some abdominal pain. The patient was brought to the  emergency room by the family, which showed possible gastroparesis  and thickening of the transverse colon. She is confused today  according to the niece who was with me. The patient denies pain at  this time. She says she is feeling well. Denies any nausea or vomiting  and has a good appetite. PAST MEDICAL HISTORY: Remarkable for chest pain, dyslipidemia. FAMILY HISTORY/SOCIAL HISTORY: Noncontributory. PAST SURGICAL HISTORY: Cholecystectomy in the past.    ALLERGIES: CIPRO. MEDICATIONS: Have been listed in the chart, reviewed by me. REVIEW OF SYSTEMS: Unable to reliably obtain, but denies any pain,  nausea, vomiting, diarrhea. PHYSICAL EXAMINATION  GENERAL: Fairly built female. VITAL SIGNS: Afebrile, has stable vitals. HEENT: Head normocephalic, atraumatic. NECK: Supple. LUNGS: Clear to auscultation. HEART: Systolic murmur was noted. ABDOMEN: Benign, no organomegaly. EXTREMITIES: Wasting was remarkable. NEUROLOGIC: Alert, awake, but confused. Moves all extremities. LABORATORY DATA: Hemoglobin and hematocrit is 11 and 33. Normal WBC and platelet count. Potassium is 2.9. Albumin is 3. IMAGING: CT of the abdomen showed some stool in the colon with  circumferential wall thickening of the transverse colon. Atherosclerosis  was remarkable. ASSESSMENT AND PLAN: Abdominal pain, abnormal CT scan with  transverse colon thickening with gastroparesis. Given the patient's age  and current condition, I would not recommend putting her through a  colonoscopy. The prep would be very risky.  The patient could aspirate  and die from aspiration. At her age, propofol sedation would cause  hypotension, bradycardia and cardiac arrest, and the patient would be  at risk of dying. Given her general condition and absence of any active  bleeding, I would treat her conservatively. Continue diet and manage  pain with oral pain medications. Thank you, Dr. Nelli Mae, for asking my opinion in the patient's  care.         MD FELICE Boss / Domingo.Halie  D:  05/17/2017   13:47  T:  05/17/2017   14:18  Job #:  [id number]

## 2017-05-17 NOTE — PROGRESS NOTES
NUTRITION    BPA/MST Referral       RECOMMENDATIONS / PLAN:     - Add supplement: Ensure Enlive once daily  - Continue RD inpatient monitoring and evaluation. NUTRITION INTERVENTIONS & DIAGNOSIS:     [x] Meals/Snacks: modified diet  [x] Medical food supplementation: add     Nutrition Diagnosis:  Predicted suboptimal energy intake related to pt easily distracted as evidenced by poor meal intake when not concentrating on meal    ASSESSMENT:     Subjective/Objective:  Patient lethargic at time of visit. Per nursing, pt finished 100% of breakfast today, but poor po intake of lunch due to being distracted. Needs assistance with meals.       Average po intake adequate to meet patients estimated nutritional needs:   [] Yes     [] No   [x] Unable to determine at this time    Diet: REGULAR Diet      Food Allergies:  None known   Current Appetite:   [] Good     [] Fair     [] Poor     [x] Other: unknown   Appetite/meal intake prior to admission:   [] Good     [] Fair     [] Poor     [x] Other: unknown   Feeding Limitations:  [] Swallowing difficulty    [] Chewing difficulty    [x] Other: SLP recommend regular consistency, thin liquids  Current Meal Intake: Patient Vitals for the past 100 hrs:   % Diet Eaten   05/16/17 1800 100 %       BM:  5/14  Skin Integrity:  No pressure ulcer or wound  Edema:  None   Pertinent Medications: Reviewed    Recent Labs      05/17/17   0554  05/16/17   1353   NA  140  141   K  2.9*  3.0*   CL  102  108   CO2  30  30   GLU  124*  92   BUN  10  9   CREA  1.03  0.70   CA  8.5  8.3*   MG  1.7   --    ALB  3.0*  2.9*   SGOT  17  20   ALT  13  13       Intake/Output Summary (Last 24 hours) at 05/17/17 1838  Last data filed at 05/17/17 0345   Gross per 24 hour   Intake           336.25 ml   Output                0 ml   Net           336.25 ml       Anthropometrics:  Ht Readings from Last 1 Encounters:   05/16/17 5' 1\" (1.549 m)     Last 3 Recorded Weights in this Encounter    05/16/17 2223   Weight: 56.7 kg (125 lb 1.6 oz)     Body mass index is 23.64 kg/(m^2). Weight History:   Weight Metrics 5/16/2017 12/2/2016 11/21/2016 8/28/2014 6/8/2014 4/4/2014 12/23/2013   Weight 125 lb 1.6 oz 116 lb 116 lb 128 lb 4.9 oz 129 lb 136 lb 129 lb   BMI 23.64 kg/m2 22.65 kg/m2 21.92 kg/m2 22.73 kg/m2 20.2 kg/m2 24.1 kg/m2 22.86 kg/m2        Admitting Diagnosis: Leukopenia  Abdominal pain  Hypokalemia  UTI (urinary tract infection)  Past Medical History:   Diagnosis Date    Acute diastolic heart failure (HCC)     Arthritis     Chest pain, unspecified     Noncardiac likely    Hyperlipidemia     Mitral valve disorders     Nocturia     Pneumonia     Transfusion history     No history of receiving blood or blood product transfusion(s). 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     Unspecified urinary incontinence     Urinary tract infection, site not specified        Education Needs:        [x] None identified  [] Identified - Not appropriate at this time  []  Identified and addressed - refer to education log  Learning Limitations:   [] None identified  [x] Identified: lethargy, confusion at times   Cultural, Evangelical & ethnic food preferences:  [x] None identified    [] Identified and addressed     ESTIMATED NUTRITION NEEDS:     Calories: 2188-3682 kcal (MSJx1.2-1.3) based on  [x] Actual BW: 57 kg      [] IBW   Protein: 46-57 gm (0.8-1 gm/kg) based on  [x] Actual BW      [] IBW   Fluid: 1 mL/kcal     MONITORING & EVALUATION:     Nutrition Goal(s):   1. Po intake of meals will meet >75% of patient estimated nutritional needs within the next 7 days.   Outcome:  [] Met/Ongoing    []  Not Met    [x] New/Initial Goal     Monitoring:   [x] Diet tolerance   [x] Meal intake   [] Supplement intake   [] GI symptoms/ability to tolerate po diet   [] Respiratory status   [] Plan of care      Previous Recommendations (for follow-up assessments only):     []   Implemented []   Not Implemented (RD to address)     [] No Recommendation Made     Discharge Planning:  Regular diet; consistency per SLP  [x] Participated in care planning, discharge planning, & interdisciplinary rounds as appropriate      Gordo Aburto, [address] [place] [address]   Pager: [phone]

## 2017-05-17 NOTE — PROGRESS NOTES
Problem: Dysphagia (Adult)  Goal: *Acute Goals and Plan of Care (Insert Text)  Rec:  Reg diet with thin liquids  Aspiration precautions  Oral care TID  Meds as tolerated  Outcome: Resolved/Met Date Met:  05/17/17  SPEECH LANGUAGE PATHOLOGY BEDSIDE SWALLOW EVALUATION/DISCHARGE     Patient: Rita James80 y.o. female)  Date: 5/17/2017  Primary Diagnosis: Leukopenia  Abdominal pain  Hypokalemia  UTI (urinary tract infection)        Precautions: aspiration         ASSESSMENT :  Based on the objective data described below, the patient presents with oropharyngeal swallow fxn essentially WFL. Strength, ROM, and coordination of the orofacial musculature were all found to be [facility]. All structures were intact and symmetrical. The pt presented with adequate oral transit times on all consistencies. Mastication time was appropriate. No s/sx of aspiration noted; hyolaryngeal elevation and excursion appeared adequate on all consistencies. No oral stasis noted post swallow. The pt denied globus sensation post swallow. Of note, pt completed MBS at this facility in 2014 with no aspiration/penetration events. Rec to continue reg diet with thin liquids, aspiration precautions, oral care TID, and meds as tolerated. Rec MBS to further assess oropharyngeal swallow fxn if silent aspiration is a concern per MD discretion. No further SLP services are indicated at this time. Please re-consult if needed. PLAN :  Recommendations and Planned Interventions: See above  Discharge Recommendations: [address] and To Be Determined       SUBJECTIVE:   Patient stated That Pepsi tastes great.       OBJECTIVE:       Past Medical History:   Diagnosis Date    Acute diastolic heart failure (HCC)      Arthritis      Chest pain, unspecified       Noncardiac likely    Hyperlipidemia      Mitral valve disorders      Nocturia      Pneumonia      Transfusion history       No history of receiving blood or blood product transfusion(s).  Undiagnosed cardiac murmurs      Unspecified combined systolic and diastolic heart failure      Unspecified urinary incontinence      Urinary tract infection, site not specified       Past Surgical History:   Procedure Laterality Date    ABDOMEN SURGERY PROC UNLISTED         intestines, stricture    HX CHOLECYSTECTOMY        HX HEMORRHOIDECTOMY         Prior Level of Function/Home Situation: private residence  Home Situation  Home Environment: Private residence  One/Two Story Residence: One story  Living Alone: Yes  Support Systems: Family member(s)  Patient Expects to be Discharged to[de-identified] Private residence  Current DME Used/Available at Home: None  Diet prior to admission: reg with thin per family  Current Diet:  Reg with thin   Cognitive and Communication Status:  Neurologic State: Alert, Confused  Orientation Level: Oriented to person, Oriented to place  Cognition: Decreased command following      Oral Assessment:  Oral Assessment  Labial: No impairment  Dentition: Natural;Intact  Oral Hygiene: adequate  Lingual: No impairment  Velum: No impairment  Mandible: No impairment  P.O. Trials:  Patient Position: [facility] at [facility]  Vocal quality prior to P.O.: No impairment  Consistency Presented: Thin liquid; Solid;Puree  How Presented: Self-fed/presented;Cup/sip;Spoon;Straw  Bolus Acceptance: No impairment  Bolus Formation/Control: No impairment  Propulsion: No impairment  Oral Residue: None  Initiation of Swallow: No impairment  Laryngeal Elevation: Functional  Aspiration Signs/Symptoms: None  Pharyngeal Phase Characteristics: No impairment, issues, or problems   Effective Modifications: None  Cues for Modifications: None     Oral Phase Severity: No impairment  Pharyngeal Phase Severity : No impairment     GCODESwallowing:  Swallow Current Status CH= 0%   Swallow Goal Status CH= 0%   Swallow D/C Status CH= 0%     The severity rating is based on the following outcomes:             Clinical Judgment     Pain:  Pt c/o 0/10 pain prior to evaluation. Pt c/o 0/10 pain post evaluation. After treatment:   [ ]            Patient left in no apparent distress sitting up in chair  [X]            Patient left in no apparent distress in bed  [X]            Call bell left within reach  [X]            Nursing notified  [ ]            Caregiver present  [ ]            Bed alarm activated      COMMUNICATION/EDUCATION:   [X]            SLP educated pt's family with regard to compensatory swallow strategies and                  aspiration/reflux precautions including: small bites/sips,                  alternate liquids/solids, decrease feeding rate, HOB > 45 with all po, and                   upright in bed at 30 degrees after po for at least 45 minutes. [X]            Patient/family have participated as able in goal setting and plan of care.      Thank you for this referral.     Madelaine Denise M.S. CCC-SLP/L  Speech-Language Pathologist

## 2017-05-17 NOTE — H&P
History & Physical    Patient: Rafa Montero MRN: [id number]  CSN: [id number]    YOB: 1917  Age: 80 y.o. Sex: female      DOA: 5/16/2017    Chief Complaint:   Chief Complaint   Patient presents with    Urinary Incontinence    Back Pain          HPI:     Rafa Montero is a 80 y.o.  female with a history of heart failure, urinary incontinence, and arthritis who presented to the ER yesterday via EMS for chronic abdominal pain. The patient reports she has had abdominal pain for many years, but the pain was much worse stating it \"felt like a BM\". CT was non specific, indicated moderate debris possibly due to gastroparesis. The patient denies chest pain, nausea, vomiting, and hematuria. She reports dizziness, weakness, and shortness of breath with activity. Pt has past surgical history of multiple abdominal surgeries. Today, she reports feeling much better and reports her abdominal pain is less intense and localized to the RUQ. She is slightly confused today, A&Ox2. Hospital Course:   CT 5/16  Moderate amount of ingested debris throughout the stomach. This is nonspecific. Correlate clinically for the possibility of gastroparesis.     Possible focus of circumferential wall thickening of the transverse colon. However, this could be artifactual and related to peristalsis but did appear similar on prior comparison. If clinically indicated, colonoscopy could further  assess this finding.     Stable partially calcified liver cysts. No new suspicious liver lesion.     Status post cholecystectomy.     Stable renal cysts including a possibly mildly septated right renal cyst.     Dense atherosclerosis at the origin of the renal arteries bilateral.     Fibroid uterus.     Mild diverticulosis.     Mild ventral fat-containing hernia with minimal inflammation.     Additional chronic incidentals as above.     Past Medical History:   Diagnosis Date    Acute diastolic heart failure ([address]     Arthritis     Chest pain, unspecified     Noncardiac likely    Hyperlipidemia     Mitral valve disorders     Nocturia     Pneumonia     Transfusion history     No history of receiving blood or blood product transfusion(s). 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     Unspecified urinary incontinence     Urinary tract infection, site not specified        Past Surgical History:   Procedure Laterality Date    ABDOMEN SURGERY PROC UNLISTED      intestines, stricture    HX CHOLECYSTECTOMY      HX HEMORRHOIDECTOMY         Family History   Problem Relation Age of Onset    Heart Attack Father 72    Sudden Death Neg Hx        Social History     Social History    Marital status:      Spouse name: N/A    Number of children: N/A    Years of education: N/A     Social History Main Topics    Smoking status: Former Smoker     Quit date: 10/18/2002    Smokeless tobacco: Never Used    Alcohol use No    Drug use: No    Sexual activity: Not Currently     Other Topics Concern    Not on file     Social History Narrative       Prior to Admission medications    Medication Sig Start Date End Date Taking? Authorizing Provider   traMADol (ULTRAM) 50 mg tablet Take 0.5 Tabs by mouth every six (6) hours as needed for Pain. Max Daily Amount: 100 mg. 9/13/16  Yes Jorge Luis Bone MD   furosemide (LASIX) 40 mg tablet Take 1 Tab by mouth daily as needed (edema). 11/11/15  Yes Sam Fuller MD   potassium chloride (K-DUR, KLOR-CON) 20 mEq tablet Take  by mouth daily. Historical Provider   nitroglycerin (NITROSTAT) 0.4 mg SL tablet 1 Tab by SubLINGual route every five (5) minutes as needed for Chest Pain for 3 doses. 11/6/13   Sam Fuller MD   simvastatin (ZOCOR) 20 mg tablet Take 20 mg by mouth nightly.     Historical Provider       Allergies   Allergen Reactions    Ciprofloxacin Itching       Review of Systems  GENERAL: Patient alert, awake and oriented times 2, able to communicate full sentences and not in distress. HEENT: Right eye blindness, no earache, tinnitus, sore throat or sinus congestion. NECK: No pain or stiffness. CARDIOVASCULAR: No chest pain or pressure. No palpitations. PULMONARY: No shortness of breath, cough or wheeze. GASTROINTESTINAL: Positive for abdominal pain. Reports intermittent diarrhea. Denies nausea, vomiting melena or       bright red blood per rectum. GENITOURINARY: Reports intermittent dysuria. No urinary frequency, urgency, hesitancy. MUSCULOSKELETAL: No joint or muscle pain, no back pain, no recent trauma. DERMATOLOGIC: No rash, no itching, no lesions. ENDOCRINE: No polyuria, polydipsia, no heat or cold intolerance. No recent change in    weight. HEMATOLOGICAL: No anemia or easy bruising or bleeding. NEUROLOGIC: Positive for dizziness. No headache, seizures, numbness, tingling or weakness. PSYCHIATRIC: No depression, anxiety, mood disorder, no loss of interest in normal       activity or change in sleep pattern. Physical Exam:     Physical Exam:  Visit Vitals    /67 (BP 1 Location: Left arm, BP Patient Position: Head of bed elevated (Comment degrees))    Pulse 70    Temp 97 °F (36.1 °C)    Resp 18    Ht 5' 1\" (1.549 m)    Wt 56.7 kg (125 lb 1.6 oz)    SpO2 96%    Breastfeeding No    BMI 23.64 kg/m2      O2 Device: Room air    Temp (24hrs), Av.4 °F (36.3 °C), Min:96.8 °F (36 °C), Max:98.2 °F (36.8 °C)        05/15 1901 -  0700  In: 436.3 [P.O.:100; I.V.:336.3]  Out: 800 [Urine:800]    General:  Alert, cooperative, no distress, appears stated age. Head:  Normocephalic, without obvious abnormality, atraumatic. Eyes:  Right eye lens opacity. EOM intact left eye. Nose: Nares normal. No drainage or sinus tenderness. Throat: Lips, mucosa, and tongue dry.  Teeth and gums normal.   Neck: Supple, symmetrical, trachea midline, no adenopathy, thyroid: no enlargement/tenderness/nodules, no carotid bruit and no JVD. Back:   ROM normal. No CVA tenderness. Lungs:   Clear to auscultation bilaterally. Chest wall:  No tenderness or deformity. Heart:  Regular rate and rhythm, S1, S2 normal, no murmur, click, rub or gallop. Abdomen: Tenderness to RUQ and epigastric area. Bowel sounds normal. No masses,  No organomegaly. No rigidity, no guarding. Extremities: Extremities normal, atraumatic, no cyanosis or edema. Pulses: 2+ and symmetric all extremities. Skin: Skin color, texture, turgor normal. No rashes or lesions   Neurologic: CNII-XII intact. No focal motor or sensory deficit. Labs Reviewed: All lab results for the last 24 hours reviewed. CT     Procedures/imaging: see electronic medical records for all procedures/Xrays and details which were not copied into this note but were reviewed prior to creation of Plan        Assessment/Plan     Active Problems:    Hypokalemia (8/17/2014)      Leukopenia (5/16/2017)      UTI (urinary tract infection) (5/16/2017)      Abdominal pain (5/16/2017)    Plan    Abdominal Pain/ Gastroparesis possible colitis  1. CT indicates gastroparesis, begin Reglan 5mg TID  2. Morphine 2mg PRN  3. F/U with GI consult  4. F/U stool sample for cdiff  5. Cardiac enzymes negative, continue to monitor labs: CBC, CMP, TSH, T4    Possible cystitis  1. Continue rocephin f/u urine culture  2. Monitor for possible colitis f/u GI consult    Continued care  1. Consult palliative care to discuss code status with patient  2. Consult  to discuss discharge and possible placement  3.  PT/OT consult  4 discussed with Haven Carter from palliative care team pt candidate for hospice will have meeting with the family tomorrow    Hypokalemia   Oral potassium    Recheck lab in AM cbc, CMP, mag    DVT/GI Prophylaxis:SCD's, Kalyan Bahena MD,  5/17/2017  9:03 AM

## 2017-05-17 NOTE — ROUTINE PROCESS
Bedside shift change report given to maruice RN (oncoming nurse) by Lashell Santiago RN   (offgoing nurse). Report included the following information SBAR, Kardex, MAR and Recent Results.

## 2017-05-17 NOTE — PROGRESS NOTES
Consult noted and appreciated. Chart reviewed. Patient was seen by our service in 2014 when she was more alert and oriented. At that time she had voiced that her wishes, when her heart and lungs stopped, that was her natural time to die vs more aggressive measures such as chest compressions, shock and intubation requiring ventilatory support. Mrs. Susannah Duffy currently is lying in the bed on her back with eyes open. Alert and responsive to voice and touch. Restless, trying to get out of bed, asking for her shoes, wanting to to leave. Laura andrade at bedside Mrs. Susannah Duffy could tell me her name and that she was 80 but did not know where she was, why she was here or other meaningful conversation. Call made to her niece and Liat Carrillo to set up meeting to discuss goals and wishes at this time. Did discuss over the phone my conversation with Mrs. Susannah Duffy in 2014 regarding Code Status wishes and she conveyed that DNR were her Aunt's wishes. Informed her that the physician would verify and would place DNR order as appropriate. Plan is to meet tomorrow at 2:30pm to discuss goals and plan as far as placement, mentioned by great niece, placement vs home. Patient does not appear to have hospice criteria at this time. Discussed with Ayde Stephenson NP. Will continue to follow for assistance in decision making.

## 2017-05-17 NOTE — PROGRESS NOTES
Spoke with Dr. Prashanth Holland reported K+ of 2.9- orders received for 40meq replacement this am. Order entered.

## 2017-05-17 NOTE — PROGRESS NOTES
Consult dictated. Abnormal CT scan. Given advance age prep, anaesthesia and colonoscopy could be risky given the finding on CT scan is not definite. Treat conservatively. Will sign off. Call us as needed.     Thank you    Bhavya Edwards MD

## 2017-05-17 NOTE — PROGRESS NOTES
Dysphagia evaluation completed with recs of reg solid and thin liquids, meds as tolerated. Full report to follow.      Thank you for this referral.    Jazlyn Yates M.S. CCC-SLP/L  Speech-Language Pathologist

## 2017-05-17 NOTE — ED NOTES
Shift change report given to Qiana Traylor (oncoming nurse) by WILL Spence,(offgoing nurse)Sadaf Prado. Report consisted of patients Situation, Background, Assessment and Recommendations(SBAR). Recent labs also provided. Opportunity for Questions was provided.

## 2017-05-17 NOTE — H&P
History & Physical    Patient: Teena Díaz MRN: [id number]  CSN: [id number]    YOB: 1917  Age: 80 y.o. Sex: female      DOA: 5/16/2017    Chief Complaint:   Chief Complaint   Patient presents with    Urinary Incontinence    Back Pain          HPI:     Teena Díaz is a 80 y.o.  female with a history of heart failure, urinary incontinence, and arthritis who presented to the ER yesterday via EMS for chronic abdominal pain. The patient reports she has had abdominal pain for many years, but the pain was much worse stating it \"felt like a BM\". CT was non specific, indicated moderate debris possibly due to gastroparesis. The patient denies chest pain, nausea, vomiting, and hematuria. She reports dizziness, weakness, and shortness of breath with activity. Pt has past surgical history of multiple abdominal surgeries. Today, she reports feeling much better and reports her abdominal pain is less intense and localized to the RUQ. She is slightly confused today, A&Ox2. Hospital Course:   CT 5/16  Moderate amount of ingested debris throughout the stomach. This is nonspecific. Correlate clinically for the possibility of gastroparesis.     Possible focus of circumferential wall thickening of the transverse colon. However, this could be artifactual and related to peristalsis but did appear similar on prior comparison. If clinically indicated, colonoscopy could further  assess this finding.     Stable partially calcified liver cysts. No new suspicious liver lesion.     Status post cholecystectomy.     Stable renal cysts including a possibly mildly septated right renal cyst.     Dense atherosclerosis at the origin of the renal arteries bilateral.     Fibroid uterus.     Mild diverticulosis.     Mild ventral fat-containing hernia with minimal inflammation.     Additional chronic incidentals as above.     Past Medical History:   Diagnosis Date    Acute diastolic heart failure ([address]     Arthritis     Chest pain, unspecified     Noncardiac likely    Hyperlipidemia     Mitral valve disorders     Nocturia     Pneumonia     Transfusion history     No history of receiving blood or blood product transfusion(s). 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     Unspecified urinary incontinence     Urinary tract infection, site not specified        Past Surgical History:   Procedure Laterality Date    ABDOMEN SURGERY PROC UNLISTED      intestines, stricture    HX CHOLECYSTECTOMY      HX HEMORRHOIDECTOMY         Family History   Problem Relation Age of Onset    Heart Attack Father 72    Sudden Death Neg Hx        Social History     Social History    Marital status:      Spouse name: N/A    Number of children: N/A    Years of education: N/A     Social History Main Topics    Smoking status: Former Smoker     Quit date: 10/18/2002    Smokeless tobacco: Never Used    Alcohol use No    Drug use: No    Sexual activity: Not Currently     Other Topics Concern    Not on file     Social History Narrative       Prior to Admission medications    Medication Sig Start Date End Date Taking? Authorizing Provider   traMADol (ULTRAM) 50 mg tablet Take 0.5 Tabs by mouth every six (6) hours as needed for Pain. Max Daily Amount: 100 mg. 9/13/16  Yes Marciano Dorsey MD   furosemide (LASIX) 40 mg tablet Take 1 Tab by mouth daily as needed (edema). 11/11/15  Yes Rory Gannon MD   potassium chloride (K-DUR, KLOR-CON) 20 mEq tablet Take  by mouth daily. Historical Provider   nitroglycerin (NITROSTAT) 0.4 mg SL tablet 1 Tab by SubLINGual route every five (5) minutes as needed for Chest Pain for 3 doses. 11/6/13   Rory Gannon MD   simvastatin (ZOCOR) 20 mg tablet Take 20 mg by mouth nightly.     Historical Provider       Allergies   Allergen Reactions    Ciprofloxacin Itching       Review of Systems  GENERAL: Patient alert, awake and oriented times 2, able to communicate full sentences and not in distress. HEENT: Right eye blindness, no earache, tinnitus, sore throat or sinus congestion. NECK: No pain or stiffness. CARDIOVASCULAR: No chest pain or pressure. No palpitations. PULMONARY: No shortness of breath, cough or wheeze. GASTROINTESTINAL: Positive for abdominal pain. Reports intermittent diarrhea. Denies nausea, vomiting melena or       bright red blood per rectum. GENITOURINARY: Reports intermittent dysuria. No urinary frequency, urgency, hesitancy. MUSCULOSKELETAL: No joint or muscle pain, no back pain, no recent trauma. DERMATOLOGIC: No rash, no itching, no lesions. ENDOCRINE: No polyuria, polydipsia, no heat or cold intolerance. No recent change in    weight. HEMATOLOGICAL: No anemia or easy bruising or bleeding. NEUROLOGIC: Positive for dizziness. No headache, seizures, numbness, tingling or weakness. PSYCHIATRIC: No depression, anxiety, mood disorder, no loss of interest in normal       activity or change in sleep pattern. Physical Exam:     Physical Exam:  Visit Vitals    /73 (BP 1 Location: Right arm, BP Patient Position: Head of bed elevated (Comment degrees))    Pulse 62    Temp 96.8 °F (36 °C)    Resp 18    Ht 5' 1\" (1.549 m)    Wt 125 lb 1.6 oz (56.7 kg)    SpO2 100%    Breastfeeding No    BMI 23.64 kg/m2      O2 Device: Room air    Temp (24hrs), Av.5 °F (36.4 °C), Min:96.8 °F (36 °C), Max:98.2 °F (36.8 °C)        05/15 1901 -  0700  In: 436.3 [P.O.:100; I.V.:336.3]  Out: 800 [Urine:800]    General:  Alert, cooperative, no distress, appears stated age. Head:  Normocephalic, without obvious abnormality, atraumatic. Eyes:  Right eye lens opacity. EOM intact left eye. Nose: Nares normal. No drainage or sinus tenderness. Throat: Lips, mucosa, and tongue dry.  Teeth and gums normal.   Neck: Supple, symmetrical, trachea midline, no adenopathy, thyroid: no enlargement/tenderness/nodules, no carotid bruit and no JVD. Back:   ROM normal. No CVA tenderness. Lungs:   Clear to auscultation bilaterally. Chest wall:  No tenderness or deformity. Heart:  Regular rate and rhythm, S1, S2 normal, no murmur, click, rub or gallop. Abdomen: Tenderness to RUQ and epigastric area. Bowel sounds normal. No masses,  No organomegaly. No rigidity, no guarding. Extremities: Extremities normal, atraumatic, no cyanosis or edema. Pulses: 2+ and symmetric all extremities. Skin: Skin color, texture, turgor normal. No rashes or lesions   Neurologic: CNII-XII intact. No focal motor or sensory deficit. Labs Reviewed: All lab results for the last 24 hours reviewed. CT     Procedures/imaging: see electronic medical records for all procedures/Xrays and details which were not copied into this note but were reviewed prior to creation of Plan        Assessment/Plan     Active Problems:    Hypokalemia (8/17/2014)      Leukopenia (5/16/2017)      UTI (urinary tract infection) (5/16/2017)      Abdominal pain (5/16/2017)    Plan    Abdominal Pain  1. CT indicates gastroparesis, begin Reglan 5mg TID  2. Morphine 2mg PRN  3. F/U with GI consult  4. F/U stool sample for cdiff  5. Cardiac enzymes negative, continue to monitor labs: CBC, CMP, TSH, T4    Possible cystitis/colitis   1. Continue rocephin  2. Monitor for possible colitis     Continued care  1. Consult palliative care to discuss code status with patient  2. Consult  to discuss discharge and return to home  3.  PT/OT consult      DVT/GI Prophylaxis:SCD's, [address], PA-S  5/17/2017  9:03 AM

## 2017-05-18 PROBLEM — R41.82 ALTERED MENTAL STATUS, UNSPECIFIED: Status: ACTIVE | Noted: 2017-05-18

## 2017-05-18 PROCEDURE — G8979 MOBILITY GOAL STATUS: HCPCS

## 2017-05-18 PROCEDURE — G8978 MOBILITY CURRENT STATUS: HCPCS

## 2017-05-18 PROCEDURE — 74011000258 HC RX REV CODE- 258: Performed by: FAMILY MEDICINE

## 2017-05-18 PROCEDURE — 74011250637 HC RX REV CODE- 250/637: Performed by: FAMILY MEDICINE

## 2017-05-18 PROCEDURE — 97535 SELF CARE MNGMENT TRAINING: CPT

## 2017-05-18 PROCEDURE — G8988 SELF CARE GOAL STATUS: HCPCS

## 2017-05-18 PROCEDURE — G8989 SELF CARE D/C STATUS: HCPCS

## 2017-05-18 PROCEDURE — 97530 THERAPEUTIC ACTIVITIES: CPT

## 2017-05-18 PROCEDURE — 65270000029 HC RM PRIVATE

## 2017-05-18 PROCEDURE — 74011250636 HC RX REV CODE- 250/636: Performed by: FAMILY MEDICINE

## 2017-05-18 PROCEDURE — G8987 SELF CARE CURRENT STATUS: HCPCS

## 2017-05-18 PROCEDURE — 74011000250 HC RX REV CODE- 250: Performed by: FAMILY MEDICINE

## 2017-05-18 PROCEDURE — 93005 ELECTROCARDIOGRAM TRACING: CPT

## 2017-05-18 PROCEDURE — 97161 PT EVAL LOW COMPLEX 20 MIN: CPT

## 2017-05-18 PROCEDURE — 97166 OT EVAL MOD COMPLEX 45 MIN: CPT

## 2017-05-18 RX ORDER — RISPERIDONE 0.25 MG/1
0.25 TABLET, FILM COATED ORAL
Status: DISCONTINUED | OUTPATIENT
Start: 2017-05-18 | End: 2017-05-22 | Stop reason: HOSPADM

## 2017-05-18 RX ORDER — QUETIAPINE FUMARATE 25 MG/1
12.5 TABLET, FILM COATED ORAL
Status: DISCONTINUED | OUTPATIENT
Start: 2017-05-18 | End: 2017-05-18

## 2017-05-18 RX ORDER — HYDRALAZINE HYDROCHLORIDE 25 MG/1
25 TABLET, FILM COATED ORAL
Status: DISCONTINUED | OUTPATIENT
Start: 2017-05-18 | End: 2017-05-22 | Stop reason: HOSPADM

## 2017-05-18 RX ORDER — FACIAL-BODY WIPES
10 EACH TOPICAL DAILY PRN
Status: DISCONTINUED | OUTPATIENT
Start: 2017-05-18 | End: 2017-05-22 | Stop reason: HOSPADM

## 2017-05-18 RX ADMIN — FAMOTIDINE 20 MG: 10 INJECTION, SOLUTION INTRAVENOUS at 11:20

## 2017-05-18 RX ADMIN — RISPERIDONE 0.25 MG: 0.25 TABLET ORAL at 21:32

## 2017-05-18 NOTE — PROGRESS NOTES
conducted an initial consultation and Spiritual Assessment for [facility], who is a 80 y.o.,female. Patients Primary Language is: [place]. According to the patients EMR Zoroastrianism Affiliation is: [place]. The reason the Patient came to the hospital is:   Patient Active Problem List    Diagnosis Date Noted    Abnormal CT of the abdomen 05/17/2017    Leukopenia 05/16/2017    UTI (urinary tract infection) 05/16/2017    Abdominal pain 05/16/2017    HLD (hyperlipidemia) 08/17/2014    Stasis ulcer of left lower extremity ([address] 08/17/2014    Venous (peripheral) insufficiency 08/17/2014    Frailty 08/17/2014    Hypokalemia 08/17/2014    Anemia 08/17/2014    Cellulitis 08/16/2014    Coronary atherosclerosis of native coronary artery 11/06/2013    Old myocardial infarction 10/18/2013    Transfusion history     Pneumonia     Acute diastolic heart failure (HCC)     Mitral regurgitation     Chest pain, unspecified     Undiagnosed cardiac murmurs     Overactive bladder 09/03/2013    Incontinence 08/01/2013    Recurrent UTI 08/01/2013        The  provided the following Interventions:  Initiated a relationship of care and support. Listened empathically. Provided chaplaincy education. Provided information about Spiritual Care Services. Offered prayer and assurance of continued prayers on patient's behalf. Chart reviewed. The following outcomes where achieved:  Patient processed feeling about current hospitalization. Patient expressed gratitude for 's visit. Assessment:  Patient does not have any Shinto/cultural needs that will affect patients preferences in health care. There are no spiritual or Shinto issues which require intervention at this time. Plan:  Chaplains will continue to follow and will provide pastoral care on an as needed/requested basis.    recommends bedside caregivers page  on duty if patient shows signs of acute spiritual or emotional distress.         [address] [facility].   [phone]

## 2017-05-18 NOTE — PROGRESS NOTES
[facility]: [phone]  formerly [facility]: [phone]   [facility]: [phone]    Patient Name: Yobany Sutton  YOB: 1917    Date of Initial Consult: 5/18/2017  Reason for Consult: Care Decisions  Requesting Provider: Daniela Allen MD  Primary Care Physician: Daniela Allen MD      SUMMARY:   Yobany Sutton is a 80y.o. year old with a past history of heart failure, urinary incontinence and arthritis, who was admitted on 5/16/2017 from home with a diagnosis of abdominal pain. Current medical issues leading to Palliative Medicine involvement include: care decisions. PALLIATIVE DIAGNOSES:   1. Altered mental status   2. Abdominal pain (improved)  3. Frailty     PLAN:   1. Visit with patient whom is alert and oriented x 2, confused to time and situation. Discussion with nijonas Thomas at bedside whom states patient at home is aware of her situation / surroundings and feels that with treatment of ? Infection she will be able to improve to her prior mental status. Please continue aggressive interventions, straight catheterization for urine sample, IV fluids / antibiotics, restraints if needed. 2. Niece reiterates wish for DNR/DNI, patient had previously completed DDNR to this effect as well. 3. Family verbalizes patient had been having difficulties managing at home despite having caregivers come to home with medicaid benefit. Niece interested in facility placement upon discharge. Spoke with case management (Yelitza Shipley) to inform of SNF / facility placement interest by family. 4. Initial consult note routed to primary continuity provider  5.  Communicated plan of care with: Palliative IDT, family, nursing, case management     GOALS OF CARE:     [====Goals of Care====]  GOALS OF CARE:  Patient / health care proxy stated goals: treatment as able      TREATMENT PREFERENCES:   Code Status: DNR    Advance Care Planning:  Advance Care Planning 5/16/2017   Patient's Healthcare Decision Maker is: Legal Next of Uriah Forrester   Primary Decision Maker Name Sera Kay   Primary Decision Maker Phone Number [phone]   Secondary Decision Maker Name Pamela Slaughter   Secondary Decision Maker Phone Number [phone]   Confirm Advance Directive Yes, on file       Other:    The palliative care team has discussed with patient / health care proxy about goals of care / treatment preferences for patient.  [====Goals of Care====]    Advance Care Planning 5/16/2017   Patient's Healthcare Decision Maker is: Legal Next of Uriah Forrester   Primary Decision Maker Name Sera Kay   Primary Decision Maker Phone Number [phone]   Secondary Decision Maker Name Pamela Slaughter   Secondary Decision Maker Phone Number [phone]   Confirm Advance Directive Yes, on file       HISTORY:     History obtained from: family    CHIEF COMPLAINT: abdominal pain    HPI/SUBJECTIVE:    The patient is:   [x] Verbal and participatory  [] Non-participatory due to:   Patient poor historian but offers up that \"today is a better day than yesterday\". Patient denies any sort of discomfort including no abdominal pain - only complaint is feeling that there is something \"stuck in my teeth\". Nijonas relays that patient usually is able to transfer herself around the house in her wheelchair and walk with assist of furniture for assist with transfers, etc.  Yusra states patient had increased abdominal pain at home.      Clinical Pain Assessment (nonverbal scale for nonverbal patients): Pain: 1   Patient denies pain at this time     FUNCTIONAL ASSESSMENT:     Palliative Performance Scale (PPS):  PPS: 30       PSYCHOSOCIAL/SPIRITUAL SCREENING:     Advance Care Planning:  Advance Care Planning 5/16/2017   Patient's Healthcare Decision Maker is: Legal Next of Uriah Forrester   Primary Decision Maker Name Sera Kay   Primary Decision Maker Phone Number [phone]   Secondary Decision Maker Name Mal Dhaliwal   Secondary Decision Maker Phone Number [phone]   Confirm Advance Directive Yes, on file      Any spiritual / Tenriism concerns:  [] Yes /  [x] No    Caregiver Burnout:  [] Yes /  [x] No /  [] No Caregiver Present      Anticipatory grief assessment:   [x] Normal  / [] Maladaptive       ESAS Anxiety: Anxiety: 0    ESAS Depression: Depression: 0        REVIEW OF SYSTEMS:     Positive and pertinent negative findings in ROS are noted above in HPI. The following systems were [x] reviewed / [] unable to be reviewed as   Limited review with patient given confusion   Constitutional - no current complaints  Respiratory - denies shortness of breath  Cardiac - denies chest pain  Gastrointestinal - Denies abdominal pain; niece states patient with + BM on   Musculoskeletal - Denies muscle/joint pain    Modified ESAS Completed by: provider   Fatigue: 0 Drowsiness: 1   Depression: 0 Pain: 1   Anxiety: 0 Nausea: 0   Anorexia: 2 Dyspnea: 0   Best Well-Bein     Other Problem (Comment): 0          PHYSICAL EXAM:     Wt Readings from Last 3 Encounters:   17 56.7 kg (125 lb 1.6 oz)   16 52.6 kg (116 lb)   16 52.6 kg (116 lb)     Blood pressure 163/87, pulse 73, temperature 96.9 °F (36.1 °C), resp. rate 18, height 5' 1\" (1.549 m), weight 56.7 kg (125 lb 1.6 oz), SpO2 97 %, not currently breastfeeding.   Pain:  Pain Scale 1: Numeric (0 - 10)  Pain Intensity 1: 0     Pain Location 1: Leg  Pain Orientation 1: Right  Pain Description 1: Aching  Pain Intervention(s) 1: Medication (see MAR)  Last bowel movement: documented as ; family states  last BM    Constitutional: alert, NAD; appears stated age  Respiratory: breathing not labored, symmetric  Gastrointestinal: soft non-tender, +bowel sounds  Musculoskeletal: no deformity, no tenderness to palpation  Skin: warm, dry  Neurologic: following simple commands, moving all extremities  Psychiatric: full affect, oriented to person, hospital     HISTORY:     Active Problems:    Hypokalemia (8/17/2014)      Leukopenia (5/16/2017)      UTI (urinary tract infection) (5/16/2017)      Abdominal pain (5/16/2017)      Abnormal CT of the abdomen (5/17/2017)      Past Medical History:   Diagnosis Date    Acute diastolic heart failure (HCC)     Arthritis     Chest pain, unspecified     Noncardiac likely    Hyperlipidemia     Mitral valve disorders     Nocturia     Pneumonia     Transfusion history     No history of receiving blood or blood product transfusion(s). 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     Unspecified urinary incontinence     Urinary tract infection, site not specified       Past Surgical History:   Procedure Laterality Date    ABDOMEN SURGERY PROC UNLISTED      intestines, stricture    HX CHOLECYSTECTOMY      HX HEMORRHOIDECTOMY        Family History   Problem Relation Age of Onset    Heart Attack Father 72    Sudden Death Neg Hx      History reviewed, no pertinent family history.   Social History   Substance Use Topics    Smoking status: Former Smoker     Quit date: 10/18/2002    Smokeless tobacco: Never Used    Alcohol use No     Allergies   Allergen Reactions    Ciprofloxacin Itching      Current Facility-Administered Medications   Medication Dose Route Frequency    risperiDONE (RisperDAL) tablet 0.25 mg  0.25 mg Oral QHS    hydrALAZINE (APRESOLINE) tablet 25 mg  25 mg Oral TID PRN    bisacodyl (DULCOLAX) suppository 10 mg  10 mg Rectal DAILY PRN    famotidine (PF) (PEPCID) 20 mg in sodium chloride 0.9 % 10 mL injection  20 mg IntraVENous DAILY    magnesium hydroxide (MILK OF MAGNESIA) 400 mg/5 mL oral suspension 30 mL  30 mL Oral DAILY PRN    LORazepam (ATIVAN) injection 0.5 mg  0.5 mg IntraVENous Q6H PRN    cefTRIAXone (ROCEPHIN) 1 g in 0.9% sodium chloride (MBP/ADV) 50 mL MBP  1 g IntraVENous Q24H    hydrALAZINE (APRESOLINE) 20 mg/mL injection 10 mg 10 mg IntraVENous Q6H PRN    naloxegol (MOVANTIK) tablet 12.5 mg  12.5 mg Oral ACB    morphine injection 2 mg  2 mg IntraVENous Q4H PRN        LAB AND IMAGING FINDINGS:     Lab Results   Component Value Date/Time    WBC 5.2 05/17/2017 05:54 AM    HGB 11.1 05/17/2017 05:54 AM    PLATELET 131 70/51/1164 05:54 AM     Lab Results   Component Value Date/Time    Sodium 140 05/17/2017 05:54 AM    Potassium 2.9 05/17/2017 05:54 AM    Chloride 102 05/17/2017 05:54 AM    CO2 30 05/17/2017 05:54 AM    BUN 10 05/17/2017 05:54 AM    Creatinine 1.03 05/17/2017 05:54 AM    Calcium 8.5 05/17/2017 05:54 AM    Magnesium 1.7 05/17/2017 05:54 AM      Lab Results   Component Value Date/Time    AST (SGOT) 17 05/17/2017 05:54 AM    Alk. phosphatase 82 05/17/2017 05:54 AM    Protein, total 7.3 05/17/2017 05:54 AM    Albumin 3.0 05/17/2017 05:54 AM    Globulin 4.3 05/17/2017 05:54 AM     Lab Results   Component Value Date/Time    INR 1.1 11/21/2016 03:56 PM    Prothrombin time 14.0 11/21/2016 03:56 PM    aPTT 45.6 11/21/2016 03:56 PM      Lab Results   Component Value Date/Time    Iron 28 08/18/2014 03:18 AM    TIBC 175 08/18/2014 03:18 AM    Iron % saturation 16 08/18/2014 03:18 AM    Ferritin 132 10/24/2016 12:11 PM      No results found for: PH, PCO2, PO2  No components found for: [place]   Lab Results   Component Value Date/Time     05/17/2017 10:00 AM    CK - MB 3.7 05/17/2017 10:00 AM              Total time: 50 minutes  Counseling / coordination time, spent as noted above: 30 minutes  > 50% counseling / coordination?: yes    Prolonged service was provided for  []30 min   []75 min in face to face time in the presence of the patient, spent as noted above. Time Start:   Time End:   Note: this can only be billed with 75555 (initial) or 52577 (follow up). If multiple start / stop times, list each separately.

## 2017-05-18 NOTE — PROGRESS NOTES
Progress Note    Patient: Celsa Carias MRN: [id number]  CSN: [id number]    YOB: 1917  Age: 80 y.o. Sex: female    DOA: 5/16/2017 LOS:  LOS: 2 days                    Subjective:     Nursing staff reports patient is agitated and uncooperative. Unable to complete lab orders and blood work due to uncooperative patient. Will attempt again today. Began Ativan, patient presents with increased confusion this morning, A&O x 1. She continues to report abdominal pain. GI consult recommends conservative management. Swallowing assessment reveals no dysphagia, continue regular diet of solid foods and thin liquids. Discussed with family and palliative care transition to nursing home. Will f/u with  today. HPI:  Celsa Carias is a 80 y.o.  female with a history of heart failure, urinary incontinence, and arthritis who presented to the ER yesterday via EMS for chronic abdominal pain. The patient reports she has had abdominal pain for many years, but the pain was much worse stating it \"felt like a BM\". CT was non specific, indicated moderate debris possibly due to gastroparesis. The patient denies chest pain, nausea, vomiting, and hematuria. She reports dizziness, weakness, and shortness of breath with activity. Pt has past surgical history of multiple abdominal surgeries. Today, she reports feeling much better and reports her abdominal pain is less intense and localized to the RUQ. She is slightly confused, A&Ox2.      Hospital Course:   CXR 5/16  Comparison: August 31, 2014     Findings: Fullness of the bilateral beba, right greater than left is similar to prior comparison. There is mild bibasilar atelectasis, left greater than right. There is no pleural effusion, pulmonary edema, or pneumothorax. The heart is mildly enlarged in size. There is increased density overlying the right arm.     IMPRESSION  Impression:  1. Mild bibasilar atelectasis, left greater than right.  No definite infiltrate.     2. Fullness of the beba bilaterally is grossly stable from prior.     3. Dense material overlying the region of the right arm. Correlate clinically for possibility of extravasated intravenous contrast from recent abdominal  pelvic CT. CT 5/16  Moderate amount of ingested debris throughout the stomach. This is nonspecific. Correlate clinically for the possibility of gastroparesis.     Possible focus of circumferential wall thickening of the transverse colon. However, this could be artifactual and related to peristalsis but did appear similar on prior comparison. If clinically indicated, colonoscopy could further assess this finding.      Stable partially calcified liver cysts. No new suspicious liver lesion.      Status post cholecystectomy.      Stable renal cysts including a possibly mildly septated right renal cyst.      Dense atherosclerosis at the origin of the renal arteries bilateral.      Fibroid uterus.      Mild diverticulosis.     Mild ventral fat-containing hernia with minimal inflammation.      Additional chronic incidentals as above. Chief Complaint:   Chief Complaint   Patient presents with    Urinary Incontinence    Back Pain       Review of systems  GENERAL: Patient alert, awake and oriented times 1,  not in distress. HEENT: Right eye blindness, no earache, tinnitus, sore throat or sinus congestion. NECK: No pain or stiffness. CARDIOVASCULAR: No chest pain or pressure. No palpitations. PULMONARY: No shortness of breath, cough or wheeze. GASTROINTESTINAL: Positive for abdominal pain. Denies nausea, vomiting melena or bright red blood per rectum. GENITOURINARY: Reports intermittent dysuria. No urinary frequency, urgency, hesitancy. MUSCULOSKELETAL: No joint or muscle pain, no back pain, no recent trauma. DERMATOLOGIC: No rash, no itching, no lesions. ENDOCRINE: No polyuria, polydipsia, no heat or cold intolerance. No recent change in weight. HEMATOLOGICAL: No anemia or easy bruising or bleeding. NEUROLOGIC: Positive for dizziness. No headache, seizures, numbness, tingling or weakness. PSYCHIATRIC: Increased confusion. Objective:     Physical Exam:  Visit Vitals    BP (!) 186/98 (BP 1 Location: Right arm, BP Patient Position: At rest)    Pulse 73    Temp 96 °F (35.6 °C)    Resp 18    Ht 5' 1\" (1.549 m)    Wt 125 lb 1.6 oz (56.7 kg)    SpO2 93%    Breastfeeding No    BMI 23.64 kg/m2        General:  Increased confusion and agitation, no distress. Head:  Normocephalic, without obvious abnormality, atraumatic. Eyes:  Right eye lens opacity. EOM intact left eye. Nose: Nares normal. No drainage or sinus tenderness. Throat: Lips, mucosa, and tongue dry. Teeth and gums normal.   Neck: Supple, symmetrical, trachea midline, no adenopathy, thyroid: no enlargement/tenderness/nodules, no carotid bruit and no JVD. Back:  ROM normal. No CVA tenderness. Lungs:  Clear to auscultation bilaterally. Chest wall:  No tenderness or deformity. Heart:  Regular rate and rhythm, S1, S2 normal, no murmur, click, rub or gallop. Abdomen: Tenderness to all four quadrants. Bowel sounds normal. No masses, No organomegaly. No rigidity, no guarding. Extremities: Extremities normal, atraumatic, no cyanosis or edema. Pulses: 2+ and symmetric all extremities. Skin: Skin color, texture, turgor normal. No rashes or lesions   Neurologic: CNII-XII intact. No focal motor or sensory deficit.          Intake and Output:  Current Shift:     Last three shifts:  05/16 1901 - 05/18 0700  In: 396.3 [P.O.:60; I.V.:336.3]  Out: -     Labs: Results:       Chemistry Recent Labs      05/17/17   0554  05/16/17   1353   GLU  124*  92   NA  140  141   K  2.9*  3.0*   CL  102  108   CO2  30  30   BUN  10  9   CREA  1.03  0.70   CA  8.5  8.3*   AGAP  8  3   BUCR  10*  13   AP  82  75   TP  7.3  6.8   ALB  3.0*  2.9*   GLOB  4.3*  3.9   AGRAT  0.7*  0.7*      CBC w/Diff Recent Labs      05/17/17   0554  05/16/17   1353   WBC  5.2  2.9*   RBC  3.87*  3.41*   HGB  11.1*  9.7*   HCT  33.7*  29.1*   PLT  194  187   GRANS  53  35*   LYMPH  32  48   EOS  6*  6*      Cardiac Enzymes Recent Labs      05/17/17   1000  05/17/17   0554   CPK  140  137   CKND1  2.6  2.8      Coagulation No results for input(s): PTP, INR, APTT in the last 72 hours. No lab exists for component: INREXT    Lipid Panel Lab Results   Component Value Date/Time    Cholesterol, total 228 02/16/2017 03:25 PM    HDL Cholesterol 79 02/16/2017 03:25 PM    LDL, calculated 124 02/16/2017 03:25 PM    VLDL, calculated 29.8 06/27/2016 01:03 PM    Triglyceride 124 02/16/2017 03:25 PM    CHOL/HDL Ratio 3.0 06/27/2016 01:03 PM      BNP No results for input(s): BNPP in the last 72 hours.    Liver Enzymes Recent Labs      05/17/17   0554   TP  7.3   ALB  3.0*   AP  82   SGOT  17      Thyroid Studies Lab Results   Component Value Date/Time    TSH 6.89 05/17/2017 05:54 AM          Procedures/imaging: see electronic medical records for all procedures/Xrays and details which were not copied into this note but were reviewed prior to creation of Plan    Medications:   Current Facility-Administered Medications   Medication Dose Route Frequency    risperiDONE (RisperDAL) tablet 0.25 mg  0.25 mg Oral QHS    hydrALAZINE (APRESOLINE) tablet 25 mg  25 mg Oral TID PRN    famotidine (PF) (PEPCID) 20 mg in sodium chloride 0.9 % 10 mL injection  20 mg IntraVENous DAILY    magnesium hydroxide (MILK OF MAGNESIA) 400 mg/5 mL oral suspension 30 mL  30 mL Oral DAILY PRN    LORazepam (ATIVAN) injection 0.5 mg  0.5 mg IntraVENous Q6H PRN    cefTRIAXone (ROCEPHIN) 1 g in 0.9% sodium chloride (MBP/ADV) 50 mL MBP  1 g IntraVENous Q24H    hydrALAZINE (APRESOLINE) 20 mg/mL injection 10 mg  10 mg IntraVENous Q6H PRN    naloxegol (MOVANTIK) tablet 12.5 mg  12.5 mg Oral ACB    morphine injection 2 mg  2 mg IntraVENous Q4H PRN       Assessment/Plan Active Problems:    Hypokalemia (8/17/2014)      Leukopenia (5/16/2017)      UTI (urinary tract infection) (5/16/2017)      Abdominal pain (5/16/2017)      Abnormal CT of the abdomen (5/17/2017)    Plan     Abdominal Pain/ Gastroparesis possible colitis  1. D/C Reglan, continue Movantik   2. Morphine 2mg PRN  3. Continue conservative management per GI consult recommendation  4. F/U stool sample for cdiff, patient remains on isolation precautions pending sample  5. Cardiac enzymes negative, EKG ordered to monitor  QT prolongation, continue to monitor labs: CBC, CMP, TSH, T4     Possible cystitis  1. Continue rocephin, f/u urine culture and blood culture  2. Monitor for possible colitis      Continued care  1. F/U palliative care, family meeting planned for today to discuss hospice  2. F/U  possible placement    Elevated blood pressure  1. Administer hydralazine if SBP >170  2. Continue to monitor    Agitation/Delirum   1. Ativan PRN, Risperdone daily  2. Continue to monitor    Hypokalemia   Oral potassium     D/C IV fluids.  Recheck lab in AM CBC, CMP, Mg     DVT/GI Prophylaxis: SCD's, Pepcid    Milan REBOLLEDO  5/18/2017 8:50 AM

## 2017-05-18 NOTE — PROGRESS NOTES
Problem: Self Care Deficits Care Plan (Adult)  Goal: *Acute Goals and Plan of Care (Insert Text)  Outcome: Resolved/Met Date Met:  05/18/17  OCCUPATIONAL THERAPY EVALUATION/DISCHARGE     Patient: Kamran Diallo (39 y.o. female)  Date: 5/18/2017  Primary Diagnosis: Leukopenia  Abdominal pain  Hypokalemia  UTI (urinary tract infection)        Precautions:   Fall, Skin      ASSESSMENT AND RECOMMENDATIONS:  Based on the objective data described below, the patient presents at her PLOF with basic self care tasks. She has 24/7 care at home and all needed DME. PT to address mobility training prn. Patient with bladder accident in bed. She attempted to assist with hygiene and gown change but required mod to max assist.    Skilled occupational therapy is not indicated at this time. Discharge Recommendations: None  Further Equipment Recommendations for Discharge: N/A       COMPLEXITY      Eval Complexity: History: MEDIUM Complexity : Expanded review of history including physical, cognitive and psychosocial  history ; Examination: HIGH Complexity : 5 or more performance deficits relating to physical, cognitive , or psychosocial skils that result in activity limitations and / or participation restrictions; Decision Making:HIGH Complexity : Patient presents with comorbidities that affect occupational performance. Signifigant modification of tasks or assistance (eg, physical or verbal) with assessment (s) is necessary to enable patient to complete evaluation  Assessment: Moderate Complexity          G-CODES:      Self Care  Current  CL= 60-79%   Goal  CL= 60-79%   D/C  CL= 60-79%. The severity rating is based on the Level of Assistance required for Functional Mobility and ADLs. SUBJECTIVE:   Patient stated I'm looking for the money in my bra.       OBJECTIVE DATA SUMMARY:       Past Medical History:   Diagnosis Date    Acute diastolic heart failure ([address]      Arthritis      Chest pain, unspecified Noncardiac likely    Hyperlipidemia      Mitral valve disorders      Nocturia      Pneumonia      Transfusion history       No history of receiving blood or blood product transfusion(s).  Undiagnosed cardiac murmurs      Unspecified combined systolic and diastolic heart failure      Unspecified urinary incontinence      Urinary tract infection, site not specified       Past Surgical History:   Procedure Laterality Date    ABDOMEN SURGERY PROC UNLISTED         intestines, stricture    HX CHOLECYSTECTOMY        HX HEMORRHOIDECTOMY         Prior Level of Function/Home Situation: Pt required max assist with basic self care tasks and functional mobility PTA. Home Situation  Home Environment: Private residence  One/Two Story Residence: One story  Living Alone: Yes  Support Systems: Family member(s)  Patient Expects to be Discharged to[de-identified] Private residence  Current DME Used/Available at Home: None  Tub or Shower Type:  (Patient sponge bathes at home.)  [X]     Right hand dominant       [ ]     Left hand dominant  Cognitive/Behavioral Status:  Neurologic State: Alert;Confused  Orientation Level: Oriented to person  Cognition: Follows commands;Decreased attention/concentration  Safety/Judgement: Fall prevention     Skin: Intact on UEs     Edema: None noted in UEs     Vision/Perceptual:    Acuity: Within Defined Limits       Coordination:  Fine Motor Skills-Upper: Left Intact; Right Intact    Gross Motor Skills-Upper: Left Intact; Right Intact     Balance:  Sitting: Intact; With support  Standing: Impaired     Strength:  Strength: Generally decreased, functional (UEs; 3+/5)     Tone & Sensation:Sensation: Intact (UEs)     Range of Motion:  AROM: Generally decreased, functional (UEs)     Functional Mobility and Transfers for ADLs:  Bed Mobility:  Supine to Sit: Moderate assistance  Sit to Supine: Moderate assistance  Transfers:  Sit to Stand: Maximum assistance              Toilet Transfer :  Total assistance ADL Assessment:  Feeding: Setup;Supervision     Oral Facial Hygiene/Grooming: Minimum assistance     Bathing: Maximum assistance     Upper Body Dressing: Moderate assistance     Lower Body Dressing: Maximum assistance     Toileting: Total assistance     ADL Intervention:  Patient with bladder accident in bed. She was able to stand with max assist for hygiene completion/pad replacement on bed. While seated, patient able to don a clean hospital gown with mod assist.  Cognitive Retraining  Safety/Judgement: Fall prevention     Pain:  Pt reports 0/10 pain or discomfort prior to treatment. Pt reports 0/10 pain or discomfort post treatment. Activity Tolerance:   Good     Please refer to the flowsheet for vital signs taken during this treatment. After treatment:   [ ]  Patient left in no apparent distress sitting up in chair  [X]  Patient left in no apparent distress in bed  [X]  Call bell left within reach  [X]  Nursing notified  [ ]  Caregiver present  [ ]  Bed alarm activated      COMMUNICATION/EDUCATION:   Communication/Collaboration:  [X]      Home safety education was provided and the patient/caregiver indicated understanding. [X]      Patient/family have participated as able and agree with findings and recommendations. [ ]      Patient is unable to participate in plan of care at this time.      Vivienne Corona MS OTR/L  Time Calculation: 25 mins

## 2017-05-18 NOTE — PROGRESS NOTES
Progress Note    Patient: Elo Frank MRN: [id number]  CSN: [id number]    YOB: 1917  Age: 80 y.o. Sex: female    DOA: 5/16/2017 LOS:  LOS: 2 days                    Subjective:     Nursing staff reports patient is agitated and uncooperative. Unable to complete lab orders and blood work due to uncooperative patient. Will attempt again today. Began Ativan, patient presents with increased confusion this morning, A&O x 1. She continues to report abdominal pain. GI consult recommends conservative management. Swallowing assessment reveals no dysphagia, continue regular diet of solid foods and thin liquids. Discussed with family and palliative care transition to nursing home. Pt was made DNR per palliative care  Will f/u with      HPI:  Elo Frank is a 80 y.o.  female with a history of heart failure, urinary incontinence, and arthritis who presented to the ER yesterday via EMS for chronic abdominal pain. The patient reports she has had abdominal pain for many years, but the pain was much worse stating it \"felt like a BM\". CT was non specific, indicated moderate debris possibly due to gastroparesis. The patient denies chest pain, nausea, vomiting, and hematuria. She reports dizziness, weakness, and shortness of breath with activity. Pt has past surgical history of multiple abdominal surgeries. Today, she reports feeling much better and reports her abdominal pain is less intense and localized to the RUQ. She is slightly confused, A&Ox2.      Hospital Course:   CXR 5/16  Comparison: August 31, 2014     Findings: Fullness of the bilateral beba, right greater than left is similar to prior comparison. There is mild bibasilar atelectasis, left greater than right. There is no pleural effusion, pulmonary edema, or pneumothorax. The heart is mildly enlarged in size. There is increased density overlying the right arm.     IMPRESSION  Impression:  1.  Mild bibasilar atelectasis, left greater than right. No definite infiltrate.     2. Fullness of the beba bilaterally is grossly stable from prior.     3. Dense material overlying the region of the right arm. Correlate clinically for possibility of extravasated intravenous contrast from recent abdominal  pelvic CT. CT 5/16  Moderate amount of ingested debris throughout the stomach. This is nonspecific. Correlate clinically for the possibility of gastroparesis.     Possible focus of circumferential wall thickening of the transverse colon. However, this could be artifactual and related to peristalsis but did appear similar on prior comparison. If clinically indicated, colonoscopy could further assess this finding.      Stable partially calcified liver cysts. No new suspicious liver lesion.      Status post cholecystectomy.      Stable renal cysts including a possibly mildly septated right renal cyst.      Dense atherosclerosis at the origin of the renal arteries bilateral.      Fibroid uterus.      Mild diverticulosis.     Mild ventral fat-containing hernia with minimal inflammation.      Additional chronic incidentals as above. Chief Complaint:   Chief Complaint   Patient presents with    Urinary Incontinence    Back Pain       Review of systems  GENERAL: Patient alert, awake and oriented times 1,  not in distress. HEENT: Right eye blindness, no earache, tinnitus, sore throat or sinus congestion. NECK: No pain or stiffness. CARDIOVASCULAR: No chest pain or pressure. No palpitations. PULMONARY: No shortness of breath, cough or wheeze. GASTROINTESTINAL: Positive for abdominal pain. Denies nausea, vomiting melena or bright red blood per rectum. GENITOURINARY: Reports intermittent dysuria. No urinary frequency, urgency, hesitancy. MUSCULOSKELETAL: No joint or muscle pain, no back pain, no recent trauma. DERMATOLOGIC: No rash, no itching, no lesions.    ENDOCRINE: No polyuria, polydipsia, no heat or cold intolerance. No recent change in weight. HEMATOLOGICAL: No anemia or easy bruising or bleeding. NEUROLOGIC: Positive for dizziness. No headache, seizures, numbness, tingling or weakness. PSYCHIATRIC: Increased confusion. Objective:     Physical Exam:  Visit Vitals    BP (!) 166/91 (BP 1 Location: Right arm, BP Patient Position: At rest)    Pulse 79    Temp 97 °F (36.1 °C)    Resp 18    Ht 5' 1\" (1.549 m)    Wt 56.7 kg (125 lb 1.6 oz)    SpO2 95%    Breastfeeding No    BMI 23.64 kg/m2        General:  Increased confusion and agitation, no distress. Head:  Normocephalic, without obvious abnormality, atraumatic. Eyes:  Right eye lens opacity. EOM intact left eye. Nose: Nares normal. No drainage or sinus tenderness. Throat: Lips, mucosa, and tongue dry. Teeth and gums normal.   Neck: Supple, symmetrical, trachea midline, no adenopathy, thyroid: no enlargement/tenderness/nodules, no carotid bruit and no JVD. Back:  ROM normal. No CVA tenderness. Lungs:  Clear to auscultation bilaterally. Chest wall:  No tenderness or deformity. Heart:  Regular rate and rhythm, S1, S2 normal, no murmur, click, rub or gallop. Abdomen: Tenderness to all four quadrants. Bowel sounds normal. No masses, No organomegaly. No rigidity, no guarding. Extremities: Extremities normal, atraumatic, no cyanosis or edema. Pulses: 2+ and symmetric all extremities. Skin: Skin color, texture, turgor normal. No rashes or lesions   Neurologic: CNII-XII intact. No focal motor or sensory deficit.          Intake and Output:  Current Shift:     Last three shifts:  05/16 1901 - 05/18 0700  In: 396.3 [P.O.:60; I.V.:336.3]  Out: -     Labs: Results:       Chemistry Recent Labs      05/17/17   0554  05/16/17   1353   GLU  124*  92   NA  140  141   K  2.9*  3.0*   CL  102  108   CO2  30  30   BUN  10  9   CREA  1.03  0.70   CA  8.5  8.3*   AGAP  8  3   BUCR  10*  13   AP  82  75   TP  7.3  6.8   ALB  3.0*  2.9*   GLOB  4.3* 3. 9   AGRAT  0.7*  0.7*      CBC w/Diff Recent Labs      05/17/17   0554  05/16/17   1353   WBC  5.2  2.9*   RBC  3.87*  3.41*   HGB  11.1*  9.7*   HCT  33.7*  29.1*   PLT  194  187   GRANS  53  35*   LYMPH  32  48   EOS  6*  6*      Cardiac Enzymes Recent Labs      05/17/17   1000  05/17/17   0554   CPK  140  137   CKND1  2.6  2.8      Coagulation No results for input(s): PTP, INR, APTT in the last 72 hours. No lab exists for component: INREXT, INREXT    Lipid Panel Lab Results   Component Value Date/Time    Cholesterol, total 228 02/16/2017 03:25 PM    HDL Cholesterol 79 02/16/2017 03:25 PM    LDL, calculated 124 02/16/2017 03:25 PM    VLDL, calculated 29.8 06/27/2016 01:03 PM    Triglyceride 124 02/16/2017 03:25 PM    CHOL/HDL Ratio 3.0 06/27/2016 01:03 PM      BNP No results for input(s): BNPP in the last 72 hours.    Liver Enzymes Recent Labs      05/17/17   0554   TP  7.3   ALB  3.0*   AP  82   SGOT  17      Thyroid Studies Lab Results   Component Value Date/Time    TSH 6.89 05/17/2017 05:54 AM          Procedures/imaging: see electronic medical records for all procedures/Xrays and details which were not copied into this note but were reviewed prior to creation of Plan    Medications:   Current Facility-Administered Medications   Medication Dose Route Frequency    risperiDONE (RisperDAL) tablet 0.25 mg  0.25 mg Oral QHS    hydrALAZINE (APRESOLINE) tablet 25 mg  25 mg Oral TID PRN    famotidine (PF) (PEPCID) 20 mg in sodium chloride 0.9 % 10 mL injection  20 mg IntraVENous DAILY    magnesium hydroxide (MILK OF MAGNESIA) 400 mg/5 mL oral suspension 30 mL  30 mL Oral DAILY PRN    LORazepam (ATIVAN) injection 0.5 mg  0.5 mg IntraVENous Q6H PRN    cefTRIAXone (ROCEPHIN) 1 g in 0.9% sodium chloride (MBP/ADV) 50 mL MBP  1 g IntraVENous Q24H    hydrALAZINE (APRESOLINE) 20 mg/mL injection 10 mg  10 mg IntraVENous Q6H PRN    naloxegol (MOVANTIK) tablet 12.5 mg  12.5 mg Oral ACB    morphine injection 2 mg  2 mg IntraVENous Q4H PRN       Assessment/Plan     Active Problems:    Hypokalemia (8/17/2014)      Leukopenia (5/16/2017)      UTI (urinary tract infection) (5/16/2017)      Abdominal pain (5/16/2017)      Abnormal CT of the abdomen (5/17/2017)    Plan     Abdominal Pain/ Gastroparesis possible colitis  1. D/C Reglan, continue Movantik   2. Morphine 2mg PRN  3. Continue conservative management per GI consult recommendation  4. F/U stool sample for cdiff, patient remains on isolation precautions pending sample  5. Cardiac enzymes negative, EKG ordered to monitor  QT prolongation, continue to monitor labs: CBC, CMP, TSH, T4     Possible cystitis  1. Continue rocephin, f/u urine culture and blood culture  2. Monitor for possible colitis      Continued care  1. F/U palliative care, family meeting planned for today to discuss hospice  2. F/U  possible placement    Elevated blood pressure  1. Administer hydralazine oral or IV prn  if SBP >170  2. Continue to monitor    Agitation/Delirum   1. Ativan PRN, Risperdone 0.25 mg qhs   2. Continue to monitor    Hypokalemia   Oral potassium     D/C IV fluids.  Recheck lab in AM CBC, CMP, Mg     DVT/GI Prophylaxis: SCD's, Pepcid    Skye Mead MD   5/18/2017 8:50 AM

## 2017-05-18 NOTE — PROGRESS NOTES
Problem: Mobility Impaired (Adult and Pediatric)  Goal: *Acute Goals and Plan of Care (Insert Text)  STGs to be addressed within 3 days:  1. Bed mobility: Supine to sit to supine CGA with HR for meals. 2. Activity tolerance: Tolerate up in chair 1-2 hrs for ADLs. 3. Transfers: Sit to stand to chair min A with LRAD for ADLs. LTGs to be addressed within 7 days:  1. Standing/Ambulation Balance: Increase to Good with LRAD for safe transfers and gait. 2. Ambulation: Ambulate > 15ft max/mod A with LRAD for home mobility. 3. Patient Education: Independent with HEP for home safety. Outcome: Progressing Towards Goal  PHYSICAL THERAPY EVALUATION     Patient: Myrna Aburto (72 y.o. female)  Date: 5/18/2017  Primary Diagnosis: Leukopenia  Abdominal pain  Hypokalemia  UTI (urinary tract infection)  Precautions:   Fall, Skin, Contact      ASSESSMENT :  Based on the objective data described below, the patient presents to PT with decreased functional mobility with regard to bed mobility, transfers, gait and overall tolerance for activity. Per patient's niece at bedside, patient lives alone, but has 24 hr caregivers coming into the home. She reports that patient transfers to wc only and gets full assist with ADL's. Today, patient was confused, demonstrates poor safety awareness. Patient required min/mod A for bed mobility, transitioned into standing max A, very poor standing balance and tolerance. Unable to initiate transfer to chair or bedside ambulation due to poor standing balance. Patient was assisted back to bed, positioned for comfort. Patient would benefit from PT to address above impairments and assist with discharge planning. Patient will benefit from skilled intervention to address the above impairments.   Patients rehabilitation potential is considered to be Fair  Factors which may influence rehabilitation potential include:   [ ]         None noted  [X]         Mental ability/status  [ ] Medical condition  [ ]         Home/family situation and support systems  [X]         Safety awareness  [ ]         Pain tolerance/management  [ ]         Other:        PLAN :  Recommendations and Planned Interventions:  [X]           Bed Mobility Training             [X]    Neuromuscular Re-Education  [X]           Transfer Training                   [ ]    Orthotic/Prosthetic Training  [X]           Gait Training                          [ ]    Modalities  [X]           Therapeutic Exercises          [ ]    Edema Management/Control  [X]           Therapeutic Activities            [X]    Patient and Family Training/Education  [ ]           Other (comment):     Frequency/Duration: Patient will be followed by physical therapy daily x 4-7 x week to address goals. Discharge Recommendations: Home Health  Further Equipment Recommendations for Discharge: Pt has all equipment at home       SUBJECTIVE:   Patient stated Pete Licona will try.       OBJECTIVE DATA SUMMARY:       Past Medical History:   Diagnosis Date    Acute diastolic heart failure (HCC)      Arthritis      Chest pain, unspecified       Noncardiac likely    Hyperlipidemia      Mitral valve disorders      Nocturia      Pneumonia      Transfusion history       No history of receiving blood or blood product transfusion(s).     Undiagnosed cardiac murmurs      Unspecified combined systolic and diastolic heart failure      Unspecified urinary incontinence      Urinary tract infection, site not specified       Past Surgical History:   Procedure Laterality Date    ABDOMEN SURGERY PROC UNLISTED         intestines, stricture    HX CHOLECYSTECTOMY        HX HEMORRHOIDECTOMY         Barriers to Learning/Limitations: yes;  altered mental status (Confusion)  Compensate with: Visual Cues, Verbal Cues, Tactile Cues and Kinesthetic Cues  Prior Level of Function/Home Situation:   Home Situation  Home Environment: Private residence  One/Two Story Residence: One story  Living Alone: Yes (has 24 hr caregivers coming in the home)  Support Systems: Family member(s), Home care staff  Patient Expects to be Discharged to[de-identified] Private residence  Current DME Used/Available at Home: Wheelchair  Tub or Shower Type:  (Patient sponge bathes at home.)  Critical Behavior:  Neurologic State: Alert;Confused  Psychosocial  Patient Behaviors: Calm;Confused; Cooperative  Visitor Behaviors: Calm;Supportive  Purposeful Interaction: Yes  Caritas Process: Nurture loving kindness; Attend basic human needs;Create healing environment  Caring Interventions: Reassure; Therapeutic modalities  Reassure: Caring rounds  Therapeutic Modalities: Intentional therapeutic touch  Strength:    Strength: Generally decreased, functional (B LE 2/5)  Tone & Sensation:   Tone: Normal (B LE)  Sensation: Intact (B LE intact to LT)   Range Of Motion:  AROM: Generally decreased, functional (B LE)  Functional Mobility:  Bed Mobility:  Rolling: Minimum assistance  Supine to Sit: Moderate assistance  Sit to Supine: Moderate assistance; Additional time  Scooting: Minimum assistance; Additional time  Transfers:  Sit to Stand: Maximum assistance; Additional time  Stand to Sit: Maximum assistance; Additional time  Balance:   Sitting: Intact; With support  Standing: Impaired;Pull to stand; With support (with B HHA)  Standing - Static: Poor;Constant support  Standing - Dynamic :  (N/A)  Pain:  Pain Scale 1: Numeric (0 - 10)  Pain Intensity 1: 0  Activity Tolerance:   Fair  Please refer to the flowsheet for vital signs taken during this treatment.   After treatment:   [ ] Patient left in no apparent distress sitting up in chair  [ ] Patient left sitting on EOB  [X] Patient left in no apparent distress in bed  [ ] Patient declined to be OOB at this time due to   [X] Call bell left within reach  [X] Nursing notified(WILL Jhaveri)  [X] Caregiver present  [ ] Bed alarm activated      COMMUNICATION/EDUCATION:   [X]         Fall prevention education was provided and the patient/caregiver indicated understanding. [X]         Patient/family have participated as able in goal setting and plan of care. [X]         Patient/family agree to work toward stated goals and plan of care. [ ]         Patient understands intent and goals of therapy, but is neutral about his/her participation. [ ]         Patient is unable to participate in goal setting and plan of care. Thank you for this referral.  Lauren Rayo, PT   Time Calculation: 23 mins      G-codes:  Mobility  Current  CM= 80-99%   Goal  CI= 1-19%. The severity rating is based on the Level of Assistance required for Functional Mobility and ADLs.      Eval Complexity: History: HIGH Complexity :3+ comorbidities / personal factors will impact the outcome/ POC Exam:HIGH Complexity : 4+ Standardized tests and measures addressing body structure, function, activity limitation and / or participation in recreation  Presentation: LOW Complexity : Stable, uncomplicated Overall Complexity:LOW

## 2017-05-18 NOTE — ROUTINE PROCESS
Bedside and Verbal shift change report given to WILL Garner (oncoming nurse) by Wilfred Guillaume RN (offgoing nurse). Report included the following information SBAR, Kardex, MAR and Recent Results. SITUATION:  Code Status: DNR  Reason for Admission: Leukopenia  Abdominal pain  Hypokalemia  UTI (urinary tract infection)  Hospital day: 2  Problem List:       Hospital Problems  Date Reviewed: 5/17/2017          Codes Class Noted POA    Abnormal CT of the abdomen ICD-10-CM: R93.5  ICD-9-CM: 793.6  5/17/2017 Yes        Leukopenia ICD-10-CM: D72.819  ICD-9-CM: 288.50  5/16/2017 Yes        UTI (urinary tract infection) ICD-10-CM: N39.0  ICD-9-CM: 599.0  5/16/2017 Yes        Abdominal pain ICD-10-CM: R10.9  ICD-9-CM: 789.00  5/16/2017 Yes        Hypokalemia ICD-10-CM: E87.6  ICD-9-CM: 276.8  8/17/2014 Yes              BACKGROUND:   Past Medical History:   Past Medical History:   Diagnosis Date    Acute diastolic heart failure (HCC)     Arthritis     Chest pain, unspecified     Noncardiac likely    Hyperlipidemia     Mitral valve disorders     Nocturia     Pneumonia     Transfusion history     No history of receiving blood or blood product transfusion(s). 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     Unspecified urinary incontinence     Urinary tract infection, site not specified       Patient taking anticoagulants yes    Patient has a defibrillator: no    History of shots NO for example, flu, pneumonia, tetanus   Isolation History YES for example, MRSA, CDiff    ASSESSMENT:  Changes in Assessment Throughout Shift: None  Significant Changes in 24 hours (for example, RR/code, fall)  Patient has Central Line: no Reasons if yes:   Patient has Luong Cath: no Reasons if yes:     Mobility Issues  PT  IV Patency  OR Checklist  Pending Tests    Last Vitals:  Vitals w/ MEWS Score (last day)     Date/Time MEWS Score Pulse Resp Temp BP Level of Consciousness SpO2 05/18/17 1651 1 73 18 96.9 °F (36.1 °C) 163/87 Alert 97 %    05/18/17 1204 1 79 18 97 °F (36.1 °C) (!)  166/91 Alert 95 %    05/18/17 0804 1 73 18 96 °F (35.6 °C) (!)  186/98 Alert 93 %    05/18/17 0400 1 77 17 98.2 °F (36.8 °C) 176/87 Alert 98 %    05/18/17 0015 1 68 17 97.8 °F (36.6 °C) 126/76 Alert 98 %    05/17/17 2333 -- -- -- -- -- Alert --    05/17/17 2051 1 74 17 98.4 °F (36.9 °C) 132/67 Alert --    05/17/17 1646 1 78 18 98.1 °F (36.7 °C) 128/76 Alert 97 %    05/17/17 1231 1 70 18 97 °F (36.1 °C) 125/67 Alert 96 %    05/17/17 0750 1 62 18 96.8 °F (36 °C) 131/73 Alert 100 %            PAIN    Pain Assessment    Pain Intensity 1: 0 (05/18/17 1700)    Pain Location 1: Leg    Pain Intervention(s) 1: Medication (see MAR)    Patient Stated Pain Goal: 0  Intervention effective: yes  Time of last intervention: 1900 Reassessment Completed: yes   Other actions taken for pain:     Last 3 Weights:  Last 3 Recorded Weights in this Encounter    05/16/17 2223   Weight: 56.7 kg (125 lb 1.6 oz)   Weight change:     INTAKE/OUPUT    Current Shift:      Last three shifts: 05/16 1901 - 05/18 0700  In: 396.3 [P.O.:60; I.V.:336.3]  Out: -     RECOMMENDATIONS AND DISCHARGE PLANNING  Patient needs and requests:     Pending tests/procedures:      Discharge plan for patient:     Discharge planning Needs or Barriers:     Estimated Discharge Date:  Posted on Whiteboard in Patients Room: yes       \"HEALS\" SAFETY CHECK  A safety check occurred in the patient's room between off going nurse and oncoming nurse listed above. The safety check included the below items:    H  High Alert Medications Verify all high alert medication drips (heparin, PCA, etc.)  E  Equipment Suction is set up for ALL patients (with yanker)  Red plugs utilized for all equipment (IV pumps, etc.)  WOWs wiped down at end of shift.   Room stocked with oxygen, suction, and other unit-specific supplies  A  Alarms Bed alarm is set for fall risk patients  Ensure chair alarm is in place and activated if patient is up in a chair  L  Lines Check IV for any infiltration  Luong bag is empty if patient has a Luong   Tubing and IV bags are labeled  S  Safety  Room is clean, patient is clean, and equipment is clean. Hallways are clear from equipment besides carts. Fall bracelet on for fall risk patients  Ensure room is clear and free of clutter  Suction is set up for ALL patients (with fox)  Hallways are clear from equipment besides carts.    Isolation precautions followed, supplies available outside room, sign posted    Darrell Echols RN

## 2017-05-18 NOTE — ROUTINE PROCESS
Bedside shift change report given to [address] [place] [address] Avenue (oncoming nurse) by Ayah Dahl   (offgoing nurse). Report included the following information SBAR, Kardex, MAR and Recent Results.

## 2017-05-19 LAB
ALBUMIN SERPL BCP-MCNC: 2.8 G/DL (ref 3.4–5)
ALBUMIN/GLOB SERPL: 0.6 {RATIO} (ref 0.8–1.7)
ALP SERPL-CCNC: 87 U/L (ref 45–117)
ALT SERPL-CCNC: 16 U/L (ref 13–56)
ANION GAP BLD CALC-SCNC: 7 MMOL/L (ref 3–18)
AST SERPL W P-5'-P-CCNC: 32 U/L (ref 15–37)
ATRIAL RATE: 72 BPM
BASOPHILS # BLD AUTO: 0 K/UL (ref 0–0.1)
BASOPHILS # BLD: 0 % (ref 0–2)
BILIRUB SERPL-MCNC: 0.6 MG/DL (ref 0.2–1)
BUN SERPL-MCNC: 13 MG/DL (ref 7–18)
BUN/CREAT SERPL: 16 (ref 12–20)
CALCIUM SERPL-MCNC: 8.8 MG/DL (ref 8.5–10.1)
CALCULATED P AXIS, ECG09: 66 DEGREES
CALCULATED R AXIS, ECG10: 18 DEGREES
CALCULATED T AXIS, ECG11: 37 DEGREES
CHLORIDE SERPL-SCNC: 105 MMOL/L (ref 100–108)
CO2 SERPL-SCNC: 26 MMOL/L (ref 21–32)
CREAT SERPL-MCNC: 0.81 MG/DL (ref 0.6–1.3)
DIAGNOSIS, 93000: NORMAL
DIFFERENTIAL METHOD BLD: ABNORMAL
EOSINOPHIL # BLD: 0.3 K/UL (ref 0–0.4)
EOSINOPHIL NFR BLD: 6 % (ref 0–5)
ERYTHROCYTE [DISTWIDTH] IN BLOOD BY AUTOMATED COUNT: 16 % (ref 11.6–14.5)
GLOBULIN SER CALC-MCNC: 4.8 G/DL (ref 2–4)
GLUCOSE SERPL-MCNC: 90 MG/DL (ref 74–99)
HCT VFR BLD AUTO: 35.1 % (ref 35–45)
HGB BLD-MCNC: 11.8 G/DL (ref 12–16)
LYMPHOCYTES # BLD AUTO: 44 % (ref 21–52)
LYMPHOCYTES # BLD: 2 K/UL (ref 0.9–3.6)
MAGNESIUM SERPL-MCNC: 2 MG/DL (ref 1.6–2.6)
MCH RBC QN AUTO: 28.7 PG (ref 24–34)
MCHC RBC AUTO-ENTMCNC: 33.6 G/DL (ref 31–37)
MCV RBC AUTO: 85.4 FL (ref 74–97)
MONOCYTES # BLD: 0.4 K/UL (ref 0.05–1.2)
MONOCYTES NFR BLD AUTO: 9 % (ref 3–10)
NEUTS SEG # BLD: 1.8 K/UL (ref 1.8–8)
NEUTS SEG NFR BLD AUTO: 41 % (ref 40–73)
P-R INTERVAL, ECG05: 188 MS
PLATELET # BLD AUTO: 207 K/UL (ref 135–420)
PMV BLD AUTO: 10.1 FL (ref 9.2–11.8)
POTASSIUM SERPL-SCNC: 4.5 MMOL/L (ref 3.5–5.5)
PROT SERPL-MCNC: 7.6 G/DL (ref 6.4–8.2)
Q-T INTERVAL, ECG07: 404 MS
QRS DURATION, ECG06: 76 MS
QTC CALCULATION (BEZET), ECG08: 442 MS
RBC # BLD AUTO: 4.11 M/UL (ref 4.2–5.3)
SODIUM SERPL-SCNC: 138 MMOL/L (ref 136–145)
T4 FREE SERPL-MCNC: 1 NG/DL (ref 0.7–1.5)
TSH SERPL DL<=0.05 MIU/L-ACNC: 7.44 UIU/ML (ref 0.36–3.74)
VENTRICULAR RATE, ECG03: 72 BPM
WBC # BLD AUTO: 4.5 K/UL (ref 4.6–13.2)

## 2017-05-19 PROCEDURE — 80053 COMPREHEN METABOLIC PANEL: CPT | Performed by: FAMILY MEDICINE

## 2017-05-19 PROCEDURE — 74011250637 HC RX REV CODE- 250/637: Performed by: FAMILY MEDICINE

## 2017-05-19 PROCEDURE — 84443 ASSAY THYROID STIM HORMONE: CPT | Performed by: FAMILY MEDICINE

## 2017-05-19 PROCEDURE — 85025 COMPLETE CBC W/AUTO DIFF WBC: CPT | Performed by: FAMILY MEDICINE

## 2017-05-19 PROCEDURE — 83735 ASSAY OF MAGNESIUM: CPT | Performed by: FAMILY MEDICINE

## 2017-05-19 PROCEDURE — 87086 URINE CULTURE/COLONY COUNT: CPT | Performed by: FAMILY MEDICINE

## 2017-05-19 PROCEDURE — 74011250636 HC RX REV CODE- 250/636: Performed by: FAMILY MEDICINE

## 2017-05-19 PROCEDURE — 65270000029 HC RM PRIVATE

## 2017-05-19 PROCEDURE — 84439 ASSAY OF FREE THYROXINE: CPT | Performed by: FAMILY MEDICINE

## 2017-05-19 PROCEDURE — 36415 COLL VENOUS BLD VENIPUNCTURE: CPT | Performed by: FAMILY MEDICINE

## 2017-05-19 PROCEDURE — 74011000258 HC RX REV CODE- 258: Performed by: FAMILY MEDICINE

## 2017-05-19 RX ORDER — LORAZEPAM 0.5 MG/1
0.5 TABLET ORAL
Status: DISCONTINUED | OUTPATIENT
Start: 2017-05-19 | End: 2017-05-22 | Stop reason: HOSPADM

## 2017-05-19 RX ORDER — ONDANSETRON 4 MG/1
4 TABLET, ORALLY DISINTEGRATING ORAL
Status: DISCONTINUED | OUTPATIENT
Start: 2017-05-19 | End: 2017-05-22 | Stop reason: HOSPADM

## 2017-05-19 RX ORDER — DICYCLOMINE HYDROCHLORIDE 10 MG/5ML
10 SOLUTION ORAL 4 TIMES DAILY
Status: DISCONTINUED | OUTPATIENT
Start: 2017-05-19 | End: 2017-05-22 | Stop reason: HOSPADM

## 2017-05-19 RX ORDER — POLYETHYLENE GLYCOL 3350 17 G/17G
17 POWDER, FOR SOLUTION ORAL DAILY
Status: DISCONTINUED | OUTPATIENT
Start: 2017-05-19 | End: 2017-05-22 | Stop reason: HOSPADM

## 2017-05-19 RX ADMIN — NALOXEGOL OXALATE 12.5 MG: 12.5 TABLET, FILM COATED ORAL at 08:47

## 2017-05-19 RX ADMIN — DICYCLOMINE HYDROCHLORIDE 10 MG: 10 SOLUTION ORAL at 14:06

## 2017-05-19 RX ADMIN — DICYCLOMINE HYDROCHLORIDE 10 MG: 10 SOLUTION ORAL at 18:47

## 2017-05-19 RX ADMIN — CEFTRIAXONE SODIUM 1 G: 1 INJECTION, POWDER, FOR SOLUTION INTRAMUSCULAR; INTRAVENOUS at 14:06

## 2017-05-19 RX ADMIN — POLYETHYLENE GLYCOL 3350 17 G: 17 POWDER, FOR SOLUTION ORAL at 09:19

## 2017-05-19 RX ADMIN — DICYCLOMINE HYDROCHLORIDE 10 MG: 10 SOLUTION ORAL at 22:34

## 2017-05-19 RX ADMIN — HYDRALAZINE HYDROCHLORIDE 25 MG: 25 TABLET, FILM COATED ORAL at 06:09

## 2017-05-19 RX ADMIN — DICYCLOMINE HYDROCHLORIDE 10 MG: 10 SOLUTION ORAL at 09:19

## 2017-05-19 RX ADMIN — RISPERIDONE 0.25 MG: 0.25 TABLET ORAL at 22:34

## 2017-05-19 NOTE — ROUTINE PROCESS
Bedside and Verbal shift change report given sejal Michael Hoffman RN (oncoming nurse) by Eri Bennett RN (offgoing nurse). Report included the following information SBAR, Kardex, MAR and Recent Results. SITUATION:  Code Status: DNR  Reason for Admission: Leukopenia  Abdominal pain  Hypokalemia  UTI (urinary tract infection)  Hospital day: 3  Problem List:       Hospital Problems  Date Reviewed: 5/17/2017          Codes Class Noted POA    Altered mental status, unspecified ICD-10-CM: R41.82  ICD-9-CM: 780.97  5/18/2017 Unknown        Abnormal CT of the abdomen ICD-10-CM: R93.5  ICD-9-CM: 793.6  5/17/2017 Yes        Leukopenia ICD-10-CM: D72.819  ICD-9-CM: 288.50  5/16/2017 Yes        UTI (urinary tract infection) ICD-10-CM: N39.0  ICD-9-CM: 599.0  5/16/2017 Yes        Abdominal pain ICD-10-CM: R10.9  ICD-9-CM: 789.00  5/16/2017 Yes        Hypokalemia ICD-10-CM: E87.6  ICD-9-CM: 276.8  8/17/2014 Yes              BACKGROUND:   Past Medical History:   Past Medical History:   Diagnosis Date    Acute diastolic heart failure (HCC)     Arthritis     Chest pain, unspecified     Noncardiac likely    Hyperlipidemia     Mitral valve disorders     Nocturia     Pneumonia     Transfusion history     No history of receiving blood or blood product transfusion(s). 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     Unspecified urinary incontinence     Urinary tract infection, site not specified       Patient taking anticoagulants yes    Patient has a defibrillator: no    History of shots NO for example, flu, pneumonia, tetanus   Isolation History YES for example, MRSA, CDiff    ASSESSMENT:  Changes in Assessment Throughout Shift: None  Significant Changes in 24 hours (for example, RR/code, fall)  Patient has Central Line: no Reasons if yes:   Patient has Luong Cath: no Reasons if yes:     Mobility Issues  PT  IV Patency  OR Checklist  Pending Tests    Last Vitals:  Vitals w/ MEWS Score (last day)     Date/Time MEWS Score Pulse Resp Temp BP Level of Consciousness SpO2    05/19/17 1547 1 86 18 98.1 °F (36.7 °C) 119/62 Alert 92 %    05/19/17 1250 1 84 18 97.3 °F (36.3 °C) 104/62 Alert 97 %    05/19/17 0747 1 87 18 96.4 °F (35.8 °C) 125/75 Alert 100 %    05/19/17 0541 1 80 18 97.7 °F (36.5 °C) (!)  174/94 Alert 96 %    05/19/17 0115 1 87 18 97.9 °F (36.6 °C) 146/82 Alert 95 %    05/18/17 2032 1 85 17 97.2 °F (36.2 °C) 174/90 Alert 98 %    05/18/17 1651 1 73 18 96.9 °F (36.1 °C) 163/87 Alert 97 %    05/18/17 1204 1 79 18 97 °F (36.1 °C) (!)  166/91 Alert 95 %    05/18/17 0804 1 73 18 96 °F (35.6 °C) (!)  186/98 Alert 93 %    05/18/17 0400 1 77 17 98.2 °F (36.8 °C) 176/87 Alert 98 %    05/18/17 0015 1 68 17 97.8 °F (36.6 °C) 126/76 Alert 98 %            PAIN    Pain Assessment    Pain Intensity 1: 0 (05/19/17 0800)    Pain Location 1: Leg    Pain Intervention(s) 1: Medication (see MAR)    Patient Stated Pain Goal: 0  Intervention effective: yes  Time of last intervention: 1900 Reassessment Completed: yes   Other actions taken for pain:     Last 3 Weights:  Last 3 Recorded Weights in this Encounter    05/16/17 2223   Weight: 56.7 kg (125 lb 1.6 oz)   Weight change:     INTAKE/OUPUT    Current Shift:      Last three shifts: 05/18 0701 - 05/19 1900  In: 150 [P.O.:150]  Out: -     RECOMMENDATIONS AND DISCHARGE PLANNING  Patient needs and requests:     Pending tests/procedures:      Discharge plan for patient:     Discharge planning Needs or Barriers:     Estimated Discharge Date:  Posted on Whiteboard in Patients Room: yes       \"HEALS\" SAFETY CHECK  A safety check occurred in the patient's room between off going nurse and oncoming nurse listed above.     The safety check included the below items:    H  High Alert Medications Verify all high alert medication drips (heparin, PCA, etc.)  E  Equipment Suction is set up for ALL patients (with yanker)  Red plugs utilized for all equipment (IV pumps, etc.)  WOWs wiped down at end of shift. Room stocked with oxygen, suction, and other unit-specific supplies  A  Alarms Bed alarm is set for fall risk patients  Ensure chair alarm is in place and activated if patient is up in a chair  L  Lines Check IV for any infiltration  Luong bag is empty if patient has a Luong   Tubing and IV bags are labeled  S  Safety  Room is clean, patient is clean, and equipment is clean. Hallways are clear from equipment besides carts. Fall bracelet on for fall risk patients  Ensure room is clear and free of clutter  Suction is set up for ALL patients (with fox)  Hallways are clear from equipment besides carts.    Isolation precautions followed, supplies available outside room, sign posted    Shawn Davis RN

## 2017-05-19 NOTE — CDMP QUERY
Please clarify if this patient is being treated/managed for:    => Acute on Chronic Metabolic Encephalopathy  =>Other Explanation of clinical findings  =>Unable to Determine (no explanation of clinical findings)    The medical record reflects the following:    Risk: 80 y.o.  female with a history of heart failure, urinary incontinence, and arthritis who presented to the ER yesterday via EMS for chronic abdominal pain. The patient reports she has had abdominal pain for many years, but the pain was much worse stating it \"felt like a BM\". Clinical Indicators:agitation,UTI and increased confusion    Treatment: Ativan ordered    Please clarify and document your clinical opinion in the progress notes and discharge summary including the definitive and/or presumptive diagnosis, (suspected or probable), related to the above clinical findings. Please include clinical findings supporting your diagnosis. If you DECLINE this query or would like to communicate with [facility], please utilize the \"[facility] message box\" at the TOP of the Progress Note on the right.       Thank you,[facility]

## 2017-05-19 NOTE — PROGRESS NOTES
Pt's family request long term placement at [facility]  For placement. Pt placed in e-discharge for admission consideration when she is ready for discharge.

## 2017-05-19 NOTE — PROGRESS NOTES
Progress Note    Patient: Mardelle Fabry MRN: [id number]  CSN: [id number]    YOB: 1917  Age: 80 y.o. Sex: female    DOA: 5/16/2017 LOS:  LOS: 3 days                    Subjective:     Patient presents with improved alertness this morning, A&Ox3. Urine culture still pending. She continues to report abdominal pain and constipation; denies nausea, vomiting, and diarrhea. Nursing staff reports poor appetite, but states her niece is often present to assist with eating. Will plan to add motility agents to target gastroparesis.  consult to determine possible placement of the patient at [facility] following discharge. Will follow-up with . HPI:  Mardelle Fabry is a 80 y.o.  female with a history of heart failure, urinary incontinence, and arthritis who presented to the ER 05/16 via EMS for chronic abdominal pain. The patient reports she has had abdominal pain for many years, but the pain was much worse stating it \"felt like a BM\". CT was non specific, indicated moderate debris possibly due to gastroparesis. The patient denies chest pain, nausea, vomiting, and hematuria. She reports dizziness, weakness, and shortness of breath with activity. Pt has past surgical history of multiple abdominal surgeries. On 05/17, she reports feeling much better and reports her abdominal pain is less intense and localized to the RUQ. She is slightly confused, A&Ox2. Hospital Course:   CXR 5/16  Comparison: August 31, 2014      Findings: Fullness of the bilateral beba, right greater than left is similar to prior comparison. There is mild bibasilar atelectasis, left greater than right. There is no pleural effusion, pulmonary edema, or pneumothorax. The heart is mildly enlarged in size. There is increased density overlying the right arm.      IMPRESSION  Impression:  1. Mild bibasilar atelectasis, left greater than right. No definite infiltrate.      2.  Fullness of the beba bilaterally is grossly stable from prior.      3. Dense material overlying the region of the right arm. Correlate clinically for possibility of extravasated intravenous contrast from recent abdominal  pelvic CT.     CT 5/16  Moderate amount of ingested debris throughout the stomach. This is nonspecific. Correlate clinically for the possibility of gastroparesis.     Possible focus of circumferential wall thickening of the transverse colon. However, this could be artifactual and related to peristalsis but did appear similar on prior comparison. If clinically indicated, colonoscopy could further assess this finding.      Stable partially calcified liver cysts. No new suspicious liver lesion.      Status post cholecystectomy.      Stable renal cysts including a possibly mildly septated right renal cyst.      Dense atherosclerosis at the origin of the renal arteries bilateral.      Fibroid uterus.      Mild diverticulosis.     Mild ventral fat-containing hernia with minimal inflammation.      Additional chronic incidentals as above. Chief Complaint:   Chief Complaint   Patient presents with    Urinary Incontinence    Back Pain       Review of systems  Review of systems  GENERAL: Patient alert, awake and oriented times 3, not in distress. HEENT: Right eye blindness, no earache, tinnitus, sore throat or sinus congestion. NECK: No pain or stiffness. CARDIOVASCULAR: No chest pain or pressure. No palpitations. PULMONARY: No shortness of breath, cough or wheeze. GASTROINTESTINAL: Positive for abdominal pain. Denies nausea, diarrhea, vomiting melena or bright red blood per rectum. GENITOURINARY: Reports intermittent dysuria. No urinary frequency, urgency, hesitancy. MUSCULOSKELETAL: No joint or muscle pain, no back pain, no recent trauma. DERMATOLOGIC: No rash, no itching, no lesions. ENDOCRINE: No polyuria, polydipsia, no heat or cold intolerance. No recent change in weight.    HEMATOLOGICAL: No anemia or easy bruising or bleeding. NEUROLOGIC: Positive for dizziness. No headache, seizures, numbness, tingling or weakness. PSYCHIATRIC: Confusion improved. Objective:     Physical Exam:  Visit Vitals    /75 (BP 1 Location: Right arm, BP Patient Position: At rest)    Pulse 87    Temp 96.4 °F (35.8 °C)    Resp 18    Ht 5' 1\" (1.549 m)    Wt 125 lb 1.6 oz (56.7 kg)    SpO2 100%    Breastfeeding No    BMI 23.64 kg/m2        General:  A&Ox3, no distress. Head:  Normocephalic, without obvious abnormality, atraumatic. Eyes:  Right eye lens opacity. EOM intact left eye. Nose: Nares normal. No drainage or sinus tenderness. Throat: Lips, mucosa, and tongue dry. Teeth and gums normal.   Neck: Supple, symmetrical, trachea midline, no adenopathy, thyroid: no enlargement/tenderness/nodules, no carotid bruit and no JVD. Back:  ROM normal. No CVA tenderness. Lungs:  Clear to auscultation bilaterally. Chest wall:  No tenderness or deformity. Heart:  Regular rate and rhythm, S1, S2 normal, no murmur, click, rub or gallop. Abdomen: Tenderness to RUQ, epigastric. Bowel sounds present. Abdominal scar located around umbilicus from cholecystectomy. No masses, No organomegaly. No rigidity, no guarding. Extremities: Extremities normal, atraumatic, no cyanosis or edema. Pulses: 2+ and symmetric all extremities. Skin: Skin color, texture, turgor normal. No rashes or lesions   Neurologic: CNII-XII intact. No focal motor or sensory deficit.          Intake and Output:  Current Shift:     Last three shifts:  05/17 1901 - 05/19 0700  In: 210 [P.O.:210]  Out: -     Labs: Results:       Chemistry Recent Labs      05/19/17   0420  05/17/17   0554  05/16/17   1353   GLU  90  124*  92   NA  138  140  141   K  4.5  2.9*  3.0*   CL  105  102  108   CO2  26  30  30   BUN  13  10  9   CREA  0.81  1.03  0.70   CA  8.8  8.5  8.3*   AGAP  7  8  3   BUCR  16  10*  13   AP  87  82  75   TP  7.6  7.3  6.8   ALB  2.8* 3. 0*  2.9*   GLOB  4.8*  4.3*  3.9   AGRAT  0.6*  0.7*  0.7*      CBC w/Diff Recent Labs      05/19/17   0420  05/17/17   0554  05/16/17   1353   WBC  4.5*  5.2  2.9*   RBC  4.11*  3.87*  3.41*   HGB  11.8*  11.1*  9.7*   HCT  35.1  33.7*  29.1*   PLT  207  194  187   GRANS  41  53  35*   LYMPH  44  32  48   EOS  6*  6*  6*      Cardiac Enzymes Recent Labs      05/17/17   1000  05/17/17   0554   CPK  140  137   CKND1  2.6  2.8      Coagulation No results for input(s): PTP, INR, APTT in the last 72 hours. No lab exists for component: INREXT    Lipid Panel Lab Results   Component Value Date/Time    Cholesterol, total 228 02/16/2017 03:25 PM    HDL Cholesterol 79 02/16/2017 03:25 PM    LDL, calculated 124 02/16/2017 03:25 PM    VLDL, calculated 29.8 06/27/2016 01:03 PM    Triglyceride 124 02/16/2017 03:25 PM    CHOL/HDL Ratio 3.0 06/27/2016 01:03 PM      BNP No results for input(s): BNPP in the last 72 hours.    Liver Enzymes Recent Labs      05/19/17   0420   TP  7.6   ALB  2.8*   AP  87   SGOT  32      Thyroid Studies Lab Results   Component Value Date/Time    TSH 6.89 05/17/2017 05:54 AM          Procedures/imaging: see electronic medical records for all procedures/Xrays and details which were not copied into this note but were reviewed prior to creation of Plan    Medications:   Current Facility-Administered Medications   Medication Dose Route Frequency    LORazepam (ATIVAN) tablet 0.5 mg  0.5 mg Oral TID PRN    polyethylene glycol (MIRALAX) packet 17 g  17 g Oral DAILY    ondansetron (ZOFRAN ODT) tablet 4 mg  4 mg Oral Q8H PRN    dicyclomine (BENTYL) 10 mg/5 mL oral solution 10 mg  10 mg Oral QID    risperiDONE (RisperDAL) tablet 0.25 mg  0.25 mg Oral QHS    hydrALAZINE (APRESOLINE) tablet 25 mg  25 mg Oral TID PRN    bisacodyl (DULCOLAX) suppository 10 mg  10 mg Rectal DAILY PRN    famotidine (PF) (PEPCID) 20 mg in sodium chloride 0.9 % 10 mL injection  20 mg IntraVENous DAILY    magnesium hydroxide (MILK OF MAGNESIA) 400 mg/5 mL oral suspension 30 mL  30 mL Oral DAILY PRN    cefTRIAXone (ROCEPHIN) 1 g in 0.9% sodium chloride (MBP/ADV) 50 mL MBP  1 g IntraVENous Q24H    hydrALAZINE (APRESOLINE) 20 mg/mL injection 10 mg  10 mg IntraVENous Q6H PRN    naloxegol (MOVANTIK) tablet 12.5 mg  12.5 mg Oral ACB       Assessment/Plan     Active Problems:    Hypokalemia (8/17/2014)      Leukopenia (5/16/2017)      UTI (urinary tract infection) (5/16/2017)      Abdominal pain (5/16/2017)      Abnormal CT of the abdomen (5/17/2017)      Altered mental status, unspecified (5/18/2017)    Plan      Abdominal Pain/ Gastroparesis possible colitis  1. D/C Reglan, continue Movantik   2. D/C morphine 2mg PRN, reassess tomorrow  3. Begin Zofran 4mg, Miralax, Bentyl  4. Continue conservative management per GI consult recommendation  5. Patient has not had diarrhea, unable to collect stool  sample for cdiff, d/c  isolation precautions. 6. Cardiac enzymes negative, EKG shows normal sinus rhythm, continue to monitor labs: CBC, CMP, TSH, T4      Possible cystitis  1. Continue rocephin, f/u urine culture and blood culture  2. If culture is negative, consider addition of erythromycin  3. Monitor for possible colitis       Continued care  1. F/U palliative care, family meeting discussing continued care following discharge  2. F/U  possible placement at [facility]     Elevated blood pressure  1. BP is controlled    Administer hydralazine oral or IV prn  if SBP >170  2. Continue to monitor     Agitation/Delirum   1. Ativan PO PRN, Risperdone 0.25 mg qhs   2. Continue to monitor     Hypokalemia   Oral potassium      D/C IV fluids.  Recheck lab in AM CBC, CMP, Mg, TSH, T4      DVT/GI Prophylaxis: SCD's, Pepcid    Dalton Riley  5/19/2017 8:41 AM

## 2017-05-19 NOTE — PROGRESS NOTES
Progress Note    Patient: Catarina Turpin MRN: [id number]  CSN: [id number]    YOB: 1917  Age: 80 y.o. Sex: female    DOA: 5/16/2017 LOS:  LOS: 3 days                    Subjective:     Patient presents with improved alertness this morning, A&Ox3. Urine culture still pending discussed with nursing staff might need straight cath. She continues to report abdominal pain and constipation; denies nausea, vomiting, and diarrhea. Nursing staff reports poor appetite, but states her niece is often present to assist with eating. Will plan to add motility agents to target gastroparesis.  consult to determine possible placement of the patient at [facility] following discharge. Will follow-up with . HPI:  Catarina Turpin is a 80 y.o.  female with a history of heart failure, urinary incontinence, and arthritis who presented to the ER 05/16 via EMS for chronic abdominal pain. The patient reports she has had abdominal pain for many years, but the pain was much worse stating it \"felt like a BM\". CT was non specific, indicated moderate debris possibly due to gastroparesis. The patient denies chest pain, nausea, vomiting, and hematuria. She reports dizziness, weakness, and shortness of breath with activity. Pt has past surgical history of multiple abdominal surgeries. On 05/17, she reports feeling much better and reports her abdominal pain is less intense and localized to the RUQ. She is slightly confused, A&Ox2. Hospital Course:   CXR 5/16  Comparison: August 31, 2014      Findings: Fullness of the bilateral beba, right greater than left is similar to prior comparison. There is mild bibasilar atelectasis, left greater than right. There is no pleural effusion, pulmonary edema, or pneumothorax. The heart is mildly enlarged in size. There is increased density overlying the right arm.      IMPRESSION  Impression:  1. Mild bibasilar atelectasis, left greater than right.  No definite infiltrate.      2. Fullness of the beba bilaterally is grossly stable from prior.      3. Dense material overlying the region of the right arm. Correlate clinically for possibility of extravasated intravenous contrast from recent abdominal  pelvic CT.     CT 5/16  Moderate amount of ingested debris throughout the stomach. This is nonspecific. Correlate clinically for the possibility of gastroparesis.     Possible focus of circumferential wall thickening of the transverse colon. However, this could be artifactual and related to peristalsis but did appear similar on prior comparison. If clinically indicated, colonoscopy could further assess this finding.      Stable partially calcified liver cysts. No new suspicious liver lesion.      Status post cholecystectomy.      Stable renal cysts including a possibly mildly septated right renal cyst.      Dense atherosclerosis at the origin of the renal arteries bilateral.      Fibroid uterus.      Mild diverticulosis.     Mild ventral fat-containing hernia with minimal inflammation.      Additional chronic incidentals as above. Chief Complaint:   Chief Complaint   Patient presents with    Urinary Incontinence    Back Pain       Review of systems  Review of systems  GENERAL: Patient alert, awake and oriented times 3, not in distress. HEENT: Right eye blindness, no earache, tinnitus, sore throat or sinus congestion. NECK: No pain or stiffness. CARDIOVASCULAR: No chest pain or pressure. No palpitations. PULMONARY: No shortness of breath, cough or wheeze. GASTROINTESTINAL: Positive for abdominal pain. Denies nausea, diarrhea, vomiting melena or bright red blood per rectum. GENITOURINARY: Reports intermittent dysuria. No urinary frequency, urgency, hesitancy. MUSCULOSKELETAL: No joint or muscle pain, no back pain, no recent trauma. DERMATOLOGIC: No rash, no itching, no lesions. ENDOCRINE: No polyuria, polydipsia, no heat or cold intolerance.  No recent change in weight. HEMATOLOGICAL: No anemia or easy bruising or bleeding. NEUROLOGIC: Positive for dizziness. No headache, seizures, numbness, tingling or weakness. PSYCHIATRIC: Confusion improved. Objective:     Physical Exam:  Visit Vitals    /75 (BP 1 Location: Right arm, BP Patient Position: At rest)    Pulse 87    Temp 96.4 °F (35.8 °C)    Resp 18    Ht 5' 1\" (1.549 m)    Wt 56.7 kg (125 lb 1.6 oz)    SpO2 100%    Breastfeeding No    BMI 23.64 kg/m2        General:  A&Ox3, no distress. Head:  Normocephalic, without obvious abnormality, atraumatic. Eyes:  Right eye lens opacity. EOM intact left eye. Nose: Nares normal. No drainage or sinus tenderness. Throat: Lips, mucosa, and tongue dry. Teeth and gums normal.   Neck: Supple, symmetrical, trachea midline, no adenopathy, thyroid: no enlargement/tenderness/nodules, no carotid bruit and no JVD. Back:  ROM normal. No CVA tenderness. Lungs:  Clear to auscultation bilaterally. Chest wall:  No tenderness or deformity. Heart:  Regular rate and rhythm, S1, S2 normal, no murmur, click, rub or gallop. Abdomen: Tenderness to RUQ, epigastric. Bowel sounds present. Abdominal scar located around umbilicus from cholecystectomy. No masses, No organomegaly. No rigidity, no guarding. Extremities: Extremities normal, atraumatic, no cyanosis or edema. Pulses: 2+ and symmetric all extremities. Skin: Skin color, texture, turgor normal. No rashes or lesions   Neurologic: CNII-XII intact. No focal motor or sensory deficit.          Intake and Output:  Current Shift:     Last three shifts:  05/17 1901 - 05/19 0700  In: 210 [P.O.:210]  Out: -     Labs: Results:       Chemistry Recent Labs      05/19/17   0420  05/17/17   0554  05/16/17   1353   GLU  90  124*  92   NA  138  140  141   K  4.5  2.9*  3.0*   CL  105  102  108   CO2  26  30  30   BUN  13  10  9   CREA  0.81  1.03  0.70   CA  8.8  8.5  8.3*   AGAP  7  8  3   BUCR  16  10* 13   AP  87  82  75   TP  7.6  7.3  6.8   ALB  2.8*  3.0*  2.9*   GLOB  4.8*  4.3*  3.9   AGRAT  0.6*  0.7*  0.7*      CBC w/Diff Recent Labs      05/19/17   0420  05/17/17   0554  05/16/17   1353   WBC  4.5*  5.2  2.9*   RBC  4.11*  3.87*  3.41*   HGB  11.8*  11.1*  9.7*   HCT  35.1  33.7*  29.1*   PLT  207  194  187   GRANS  41  53  35*   LYMPH  44  32  48   EOS  6*  6*  6*      Cardiac Enzymes Recent Labs      05/17/17   1000  05/17/17   0554   CPK  140  137   CKND1  2.6  2.8      Coagulation No results for input(s): PTP, INR, APTT in the last 72 hours. No lab exists for component: INREXT, INREXT    Lipid Panel Lab Results   Component Value Date/Time    Cholesterol, total 228 02/16/2017 03:25 PM    HDL Cholesterol 79 02/16/2017 03:25 PM    LDL, calculated 124 02/16/2017 03:25 PM    VLDL, calculated 29.8 06/27/2016 01:03 PM    Triglyceride 124 02/16/2017 03:25 PM    CHOL/HDL Ratio 3.0 06/27/2016 01:03 PM      BNP No results for input(s): BNPP in the last 72 hours.    Liver Enzymes Recent Labs      05/19/17   0420   TP  7.6   ALB  2.8*   AP  87   SGOT  32      Thyroid Studies Lab Results   Component Value Date/Time    TSH 6.89 05/17/2017 05:54 AM          Procedures/imaging: see electronic medical records for all procedures/Xrays and details which were not copied into this note but were reviewed prior to creation of Plan    Medications:   Current Facility-Administered Medications   Medication Dose Route Frequency    LORazepam (ATIVAN) tablet 0.5 mg  0.5 mg Oral TID PRN    polyethylene glycol (MIRALAX) packet 17 g  17 g Oral DAILY    ondansetron (ZOFRAN ODT) tablet 4 mg  4 mg Oral Q8H PRN    dicyclomine (BENTYL) 10 mg/5 mL oral solution 10 mg  10 mg Oral QID    risperiDONE (RisperDAL) tablet 0.25 mg  0.25 mg Oral QHS    hydrALAZINE (APRESOLINE) tablet 25 mg  25 mg Oral TID PRN    bisacodyl (DULCOLAX) suppository 10 mg  10 mg Rectal DAILY PRN    famotidine (PF) (PEPCID) 20 mg in sodium chloride 0.9 % 10 mL injection  20 mg IntraVENous DAILY    magnesium hydroxide (MILK OF MAGNESIA) 400 mg/5 mL oral suspension 30 mL  30 mL Oral DAILY PRN    cefTRIAXone (ROCEPHIN) 1 g in 0.9% sodium chloride (MBP/ADV) 50 mL MBP  1 g IntraVENous Q24H    hydrALAZINE (APRESOLINE) 20 mg/mL injection 10 mg  10 mg IntraVENous Q6H PRN    naloxegol (MOVANTIK) tablet 12.5 mg  12.5 mg Oral ACB       Assessment/Plan     Active Problems:    Hypokalemia (8/17/2014)      Leukopenia (5/16/2017)      UTI (urinary tract infection) (5/16/2017)      Abdominal pain (5/16/2017)      Abnormal CT of the abdomen (5/17/2017)      Altered mental status, unspecified (5/18/2017)    Plan      Abdominal Pain/ Gastroparesis possible colitis  1. D/C Reglan, continue Movantik   2. D/C morphine 2mg PRN, reassess tomorrow  3. Begin Zofran 4mg, Miralax, Bentyl  4. Continue conservative management per GI consult recommendation  5. Patient has not had diarrhea, unable to collect stool  sample for cdiff, d/c  isolation precautions. 6. Cardiac enzymes negative, EKG shows normal sinus rhythm, continue to monitor labs: CBC, CMP, TSH, T4      Possible cystitis  1. Continue rocephin, f/u urine culture and blood culture  2. If culture is negative, consider addition of erythromycin  3. Monitor for possible colitis       Dementia / deconditioning   1. F/U palliative care, family meeting discussing continued care following discharge  2. F/U  possible placement at [facility]  Pt and ot ordered today     Elevated blood pressure  1. BP is controlled this morning  Administer hydralazine oral or IV prn  if SBP >170  2. Continue to monitor  3 might add Norvasc 2.5 daily      Agitation/Delirum   1. Ativan PO PRN, Risperdone 0.25 mg qhs   2. Continue to monitor     Hypokalemia   Oral potassium prn  Potassium normal today  Will continue monitor      D/C IV fluids.  Recheck lab in AM CBC, CMP, Mg, TSH, T4      DVT/GI Prophylaxis: SCD's, Pepcitracy Oscar MD  5/19/2017 8:41 AM

## 2017-05-19 NOTE — PROGRESS NOTES
New OT orders received and chart reviewed. Patient was evaluated on 5/18/17 and discharged from OT. New OT order will be acknowledged and discontinued.  Thank you for the referral.     Niru Neal OTR/L

## 2017-05-19 NOTE — PROGRESS NOTES
NUTRITION    BPA/MST Referral       RECOMMENDATIONS / PLAN:     - Continue with current nutrition interventions. - Assistance with meals  - Continue RD inpatient monitoring and evaluation. NUTRITION INTERVENTIONS & DIAGNOSIS:     [x] Meals/Snacks: modified diet  [x] Medical food supplementation: Ensure Enlive once daily     Nutrition Diagnosis:  Predicted suboptimal energy intake related to pt easily distracted as evidenced by poor meal intake when not concentrating on meal    ASSESSMENT:     Subjective/Objective:  Patient lethargic at time of visit. Poor appetite per MD report, but niece often present to assist with eating. Per previous report from nursing, pt has good meal intake when not distracted.  Good meal intake today per RN    Average po intake adequate to meet patients estimated nutritional needs:   [] Yes     [x] No   [] Unable to determine at this time    Diet: REGULAR Diet      Food Allergies:  None known   Current Appetite:   [] Good     [x] Fair      [] Poor     [] Other: unknown   Appetite/meal intake prior to admission:   [] Good     [] Fair     [] Poor     [x] Other: unknown   Feeding Limitations:  [] Swallowing difficulty    [] Chewing difficulty    [x] Other: SLP recommend regular consistency, thin liquids  Current Meal Intake:   Patient Vitals for the past 100 hrs:   % Diet Eaten   05/16/17 1800 100 %       BM:  5/14  Skin Integrity:  No pressure ulcer or wound  Edema:  None   Pertinent Medications: Reviewed    Recent Labs      05/19/17   0420  05/17/17   0554   NA  138  140   K  4.5  2.9*   CL  105  102   CO2  26  30   GLU  90  124*   BUN  13  10   CREA  0.81  1.03   CA  8.8  8.5   MG  2.0  1.7   ALB  2.8*  3.0*   SGOT  32  17   ALT  16  13       Intake/Output Summary (Last 24 hours) at 05/19/17 1636  Last data filed at 05/19/17 0326   Gross per 24 hour   Intake              150 ml   Output                0 ml   Net              150 ml       Anthropometrics:  Ht Readings from Last 1 Encounters:   05/16/17 5' 1\" (1.549 m)     Last 3 Recorded Weights in this Encounter    05/16/17 2223   Weight: 56.7 kg (125 lb 1.6 oz)     Body mass index is 23.64 kg/(m^2). Weight History:   Weight Metrics 5/16/2017 12/2/2016 11/21/2016 8/28/2014 6/8/2014 4/4/2014 12/23/2013   Weight 125 lb 1.6 oz 116 lb 116 lb 128 lb 4.9 oz 129 lb 136 lb 129 lb   BMI 23.64 kg/m2 22.65 kg/m2 21.92 kg/m2 22.73 kg/m2 20.2 kg/m2 24.1 kg/m2 22.86 kg/m2        Admitting Diagnosis: Leukopenia  Abdominal pain  Hypokalemia  UTI (urinary tract infection)  Past Medical History:   Diagnosis Date    Acute diastolic heart failure (HCC)     Arthritis     Chest pain, unspecified     Noncardiac likely    Hyperlipidemia     Mitral valve disorders     Nocturia     Pneumonia     Transfusion history     No history of receiving blood or blood product transfusion(s). 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     Unspecified urinary incontinence     Urinary tract infection, site not specified        Education Needs:        [x] None identified  [] Identified - Not appropriate at this time  []  Identified and addressed - refer to education log  Learning Limitations:   [] None identified  [x] Identified: lethargy, confusion at times   Cultural, Amish & ethnic food preferences:  [x] None identified    [] Identified and addressed     ESTIMATED NUTRITION NEEDS:     Calories: 6355-3227 kcal ([id number]) based on  [x] Actual BW: 57 kg      [] IBW   Protein: 46-57 gm (0.8-1 gm/kg) based on  [x] Actual BW      [] IBW   Fluid: 1 mL/kcal     MONITORING & EVALUATION:     Nutrition Goal(s):   1. Po intake of meals will meet >75% of patient estimated nutritional needs within the next 7 days.   Outcome:  [] Met/Ongoing    [x]  Not Met: Progressing    [] New/Initial Goal     Monitoring:   [x] Diet tolerance   [x] Meal intake   [] Supplement intake   [] GI symptoms/ability to tolerate po diet [] Respiratory status   [] Plan of care      Previous Recommendations (for follow-up assessments only):     [x]   Implemented       []   Not Implemented (RD to address)     [] No Recommendation Made     Discharge Planning:  Regular diet; consistency per SLP  [x] Participated in care planning, discharge planning, & interdisciplinary rounds as appropriate      Valencia Frederick, [address] [place] [address]   Pager: [phone]

## 2017-05-19 NOTE — PROGRESS NOTES
New PT orders seen and acknowledged. Pt evaluated 5/18/17, and currently on PT caseload.  Thank you for your referral.    Ani Malagon, PTA

## 2017-05-19 NOTE — ROUTINE PROCESS
Bedside and Verbal shift change report given sejal Simons RN (oncoming nurse) by Miguel Marquez RN (offgoing nurse). Report included the following information SBAR, Kardex, MAR and Recent Results. SITUATION:  Code Status: DNR  Reason for Admission: Leukopenia  Abdominal pain  Hypokalemia  UTI (urinary tract infection)  Hospital day: 3  Problem List:       Hospital Problems  Date Reviewed: 5/17/2017          Codes Class Noted POA    Altered mental status, unspecified ICD-10-CM: R41.82  ICD-9-CM: 780.97  5/18/2017 Unknown        Abnormal CT of the abdomen ICD-10-CM: R93.5  ICD-9-CM: 793.6  5/17/2017 Yes        Leukopenia ICD-10-CM: D72.819  ICD-9-CM: 288.50  5/16/2017 Yes        UTI (urinary tract infection) ICD-10-CM: N39.0  ICD-9-CM: 599.0  5/16/2017 Yes        Abdominal pain ICD-10-CM: R10.9  ICD-9-CM: 789.00  5/16/2017 Yes        Hypokalemia ICD-10-CM: E87.6  ICD-9-CM: 276.8  8/17/2014 Yes              BACKGROUND:   Past Medical History:   Past Medical History:   Diagnosis Date    Acute diastolic heart failure (HCC)     Arthritis     Chest pain, unspecified     Noncardiac likely    Hyperlipidemia     Mitral valve disorders     Nocturia     Pneumonia     Transfusion history     No history of receiving blood or blood product transfusion(s). 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     Unspecified urinary incontinence     Urinary tract infection, site not specified       Patient taking anticoagulants yes    Patient has a defibrillator: no    History of shots NO for example, flu, pneumonia, tetanus   Isolation History YES for example, MRSA, CDiff    ASSESSMENT:  Changes in Assessment Throughout Shift: None  Significant Changes in 24 hours (for example, RR/code, fall)  Patient has Central Line: no Reasons if yes:   Patient has Luong Cath: no Reasons if yes:     Mobility Issues  PT  IV Patency  OR Checklist  Pending Tests    Last Vitals:  Vitals w/ MEWS Score (last day)     Date/Time MEWS Score Pulse Resp Temp BP Level of Consciousness SpO2    05/19/17 0747 1 87 18 96.4 °F (35.8 °C) 125/75 Alert 100 %    05/19/17 0541 1 80 18 97.7 °F (36.5 °C) (!)  174/94 Alert 96 %    05/19/17 0115 1 87 18 97.9 °F (36.6 °C) 146/82 Alert 95 %    05/18/17 2032 1 85 17 97.2 °F (36.2 °C) 174/90 Alert 98 %    05/18/17 1651 1 73 18 96.9 °F (36.1 °C) 163/87 Alert 97 %    05/18/17 1204 1 79 18 97 °F (36.1 °C) (!)  166/91 Alert 95 %    05/18/17 0804 1 73 18 96 °F (35.6 °C) (!)  186/98 Alert 93 %    05/18/17 0400 1 77 17 98.2 °F (36.8 °C) 176/87 Alert 98 %    05/18/17 0015 1 68 17 97.8 °F (36.6 °C) 126/76 Alert 98 %            PAIN    Pain Assessment    Pain Intensity 1: 0 (05/19/17 0356)    Pain Location 1: Leg    Pain Intervention(s) 1: Medication (see MAR)    Patient Stated Pain Goal: 0  Intervention effective: yes  Time of last intervention: 1900 Reassessment Completed: yes   Other actions taken for pain:     Last 3 Weights:  Last 3 Recorded Weights in this Encounter    05/16/17 2223   Weight: 56.7 kg (125 lb 1.6 oz)   Weight change:     INTAKE/OUPUT    Current Shift:      Last three shifts: 05/17 1901 - 05/19 0700  In: 210 [P.O.:210]  Out: -     RECOMMENDATIONS AND DISCHARGE PLANNING  Patient needs and requests:     Pending tests/procedures:      Discharge plan for patient:     Discharge planning Needs or Barriers:     Estimated Discharge Date:  Posted on Whiteboard in Patients Room: yes       \"HEALS\" SAFETY CHECK  A safety check occurred in the patient's room between off going nurse and oncoming nurse listed above. The safety check included the below items:    H  High Alert Medications Verify all high alert medication drips (heparin, PCA, etc.)  E  Equipment Suction is set up for ALL patients (with fox)  Red plugs utilized for all equipment (IV pumps, etc.)  WOWs wiped down at end of shift.   Room stocked with oxygen, suction, and other unit-specific supplies  A  Alarms Bed alarm is set for fall risk patients  Ensure chair alarm is in place and activated if patient is up in a chair  L  Lines Check IV for any infiltration  Luong bag is empty if patient has a Luong   Tubing and IV bags are labeled  S  Safety  Room is clean, patient is clean, and equipment is clean. Hallways are clear from equipment besides carts. Fall bracelet on for fall risk patients  Ensure room is clear and free of clutter  Suction is set up for ALL patients (with patriceker)  Hallways are clear from equipment besides carts.    Isolation precautions followed, supplies available outside room, sign posted    Javid Hansen RN

## 2017-05-20 LAB
ANION GAP BLD CALC-SCNC: 6 MMOL/L (ref 3–18)
BACTERIA SPEC CULT: NORMAL
BASOPHILS # BLD AUTO: 0 K/UL (ref 0–0.1)
BASOPHILS # BLD: 0 % (ref 0–2)
BUN SERPL-MCNC: 21 MG/DL (ref 7–18)
BUN/CREAT SERPL: 18 (ref 12–20)
CALCIUM SERPL-MCNC: 8.9 MG/DL (ref 8.5–10.1)
CHLORIDE SERPL-SCNC: 103 MMOL/L (ref 100–108)
CO2 SERPL-SCNC: 30 MMOL/L (ref 21–32)
CREAT SERPL-MCNC: 1.14 MG/DL (ref 0.6–1.3)
DIFFERENTIAL METHOD BLD: ABNORMAL
EOSINOPHIL # BLD: 0.2 K/UL (ref 0–0.4)
EOSINOPHIL NFR BLD: 4 % (ref 0–5)
ERYTHROCYTE [DISTWIDTH] IN BLOOD BY AUTOMATED COUNT: 16 % (ref 11.6–14.5)
GLUCOSE SERPL-MCNC: 106 MG/DL (ref 74–99)
HCT VFR BLD AUTO: 33.6 % (ref 35–45)
HGB BLD-MCNC: 11.3 G/DL (ref 12–16)
LYMPHOCYTES # BLD AUTO: 42 % (ref 21–52)
LYMPHOCYTES # BLD: 1.9 K/UL (ref 0.9–3.6)
MCH RBC QN AUTO: 28.5 PG (ref 24–34)
MCHC RBC AUTO-ENTMCNC: 33.6 G/DL (ref 31–37)
MCV RBC AUTO: 84.8 FL (ref 74–97)
MONOCYTES # BLD: 0.4 K/UL (ref 0.05–1.2)
MONOCYTES NFR BLD AUTO: 8 % (ref 3–10)
NEUTS SEG # BLD: 2.1 K/UL (ref 1.8–8)
NEUTS SEG NFR BLD AUTO: 46 % (ref 40–73)
PLATELET # BLD AUTO: 217 K/UL (ref 135–420)
PMV BLD AUTO: 10.3 FL (ref 9.2–11.8)
POTASSIUM SERPL-SCNC: 4 MMOL/L (ref 3.5–5.5)
RBC # BLD AUTO: 3.96 M/UL (ref 4.2–5.3)
SERVICE CMNT-IMP: NORMAL
SODIUM SERPL-SCNC: 139 MMOL/L (ref 136–145)
WBC # BLD AUTO: 4.6 K/UL (ref 4.6–13.2)

## 2017-05-20 PROCEDURE — 74011250637 HC RX REV CODE- 250/637: Performed by: FAMILY MEDICINE

## 2017-05-20 PROCEDURE — 74011250636 HC RX REV CODE- 250/636: Performed by: FAMILY MEDICINE

## 2017-05-20 PROCEDURE — 80048 BASIC METABOLIC PNL TOTAL CA: CPT | Performed by: FAMILY MEDICINE

## 2017-05-20 PROCEDURE — 74011000258 HC RX REV CODE- 258: Performed by: FAMILY MEDICINE

## 2017-05-20 PROCEDURE — 65270000029 HC RM PRIVATE

## 2017-05-20 PROCEDURE — 36415 COLL VENOUS BLD VENIPUNCTURE: CPT | Performed by: FAMILY MEDICINE

## 2017-05-20 PROCEDURE — 74011000250 HC RX REV CODE- 250: Performed by: FAMILY MEDICINE

## 2017-05-20 PROCEDURE — 85025 COMPLETE CBC W/AUTO DIFF WBC: CPT | Performed by: FAMILY MEDICINE

## 2017-05-20 RX ORDER — LEVOTHYROXINE SODIUM 25 UG/1
25 TABLET ORAL
Status: DISCONTINUED | OUTPATIENT
Start: 2017-05-21 | End: 2017-05-22 | Stop reason: HOSPADM

## 2017-05-20 RX ADMIN — RISPERIDONE 0.25 MG: 0.25 TABLET ORAL at 21:08

## 2017-05-20 RX ADMIN — POLYETHYLENE GLYCOL 3350 17 G: 17 POWDER, FOR SOLUTION ORAL at 08:52

## 2017-05-20 RX ADMIN — DICYCLOMINE HYDROCHLORIDE 10 MG: 10 SOLUTION ORAL at 12:45

## 2017-05-20 RX ADMIN — NALOXEGOL OXALATE 12.5 MG: 12.5 TABLET, FILM COATED ORAL at 08:53

## 2017-05-20 RX ADMIN — DICYCLOMINE HYDROCHLORIDE 10 MG: 10 SOLUTION ORAL at 21:07

## 2017-05-20 RX ADMIN — CEFTRIAXONE SODIUM 1 G: 1 INJECTION, POWDER, FOR SOLUTION INTRAMUSCULAR; INTRAVENOUS at 15:12

## 2017-05-20 RX ADMIN — FAMOTIDINE 20 MG: 10 INJECTION, SOLUTION INTRAVENOUS at 08:53

## 2017-05-20 RX ADMIN — DICYCLOMINE HYDROCHLORIDE 10 MG: 10 SOLUTION ORAL at 09:50

## 2017-05-20 RX ADMIN — LORAZEPAM 0.5 MG: 0.5 TABLET ORAL at 18:23

## 2017-05-20 RX ADMIN — LORAZEPAM 0.5 MG: 0.5 TABLET ORAL at 23:52

## 2017-05-20 RX ADMIN — DICYCLOMINE HYDROCHLORIDE 10 MG: 10 SOLUTION ORAL at 18:27

## 2017-05-20 NOTE — ROUTINE PROCESS
Bedside and Verbal shift change report given sejal Noel MelvinRN (oncoming nurse) by Thereasa Kocher, RN (offgoing nurse). Report included the following information SBAR, Kardex, MAR and Recent Results. SITUATION:  Code Status: DNR  Reason for Admission: Leukopenia  Abdominal pain  Hypokalemia  UTI (urinary tract infection)  Hospital day: 4  Problem List:       Hospital Problems  Date Reviewed: 5/17/2017          Codes Class Noted POA    Altered mental status, unspecified ICD-10-CM: R41.82  ICD-9-CM: 780.97  5/18/2017 Unknown        Abnormal CT of the abdomen ICD-10-CM: R93.5  ICD-9-CM: 793.6  5/17/2017 Yes        Leukopenia ICD-10-CM: D72.819  ICD-9-CM: 288.50  5/16/2017 Yes        UTI (urinary tract infection) ICD-10-CM: N39.0  ICD-9-CM: 599.0  5/16/2017 Yes        Abdominal pain ICD-10-CM: R10.9  ICD-9-CM: 789.00  5/16/2017 Yes        Hypokalemia ICD-10-CM: E87.6  ICD-9-CM: 276.8  8/17/2014 Yes              BACKGROUND:   Past Medical History:   Past Medical History:   Diagnosis Date    Acute diastolic heart failure (HCC)     Arthritis     Chest pain, unspecified     Noncardiac likely    Hyperlipidemia     Mitral valve disorders     Nocturia     Pneumonia     Transfusion history     No history of receiving blood or blood product transfusion(s). 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     Unspecified urinary incontinence     Urinary tract infection, site not specified       Patient taking anticoagulants yes    Patient has a defibrillator: no    History of shots NO for example, flu, pneumonia, tetanus   Isolation History YES for example, MRSA, CDiff    ASSESSMENT:  Changes in Assessment Throughout Shift: None  Significant Changes in 24 hours (for example, RR/code, fall)  Patient has Central Line: no Reasons if yes:   Patient has Luong Cath: no Reasons if yes:     Mobility Issues  PT  IV Patency  OR Checklist  Pending Tests    Last Vitals:  Vitals w/ MEWS Score (last day)     Date/Time MEWS Score Pulse Resp Temp BP Level of Consciousness SpO2    05/20/17 1619 1 90 19 96.4 °F (35.8 °C) 108/69 Alert 97 %    05/20/17 1200 2 95 18 97 °F (36.1 °C) 96/61 Alert 98 %    05/20/17 0841 2 89 20 97.2 °F (36.2 °C) 102/56 Responds to Voice 99 %    05/20/17 0435 1 91 18 97.4 °F (36.3 °C) 118/62 Alert 98 %    05/20/17 0031 1 92 18 97.8 °F (36.6 °C) 108/69 Alert 100 %    05/19/17 2116 1 91 18 97.8 °F (36.6 °C) 110/65 Alert 96 %    05/19/17 1547 1 86 18 98.1 °F (36.7 °C) 119/62 Alert 92 %    05/19/17 1250 1 84 18 97.3 °F (36.3 °C) 104/62 Alert 97 %    05/19/17 0747 1 87 18 96.4 °F (35.8 °C) 125/75 Alert 100 %    05/19/17 0541 1 80 18 97.7 °F (36.5 °C) (!)  174/94 Alert 96 %    05/19/17 0115 1 87 18 97.9 °F (36.6 °C) 146/82 Alert 95 %            PAIN    Pain Assessment    Pain Intensity 1: 0 (05/20/17 1619)    Pain Location 1: Leg    Pain Intervention(s) 1: Medication (see MAR)    Patient Stated Pain Goal: 0  Intervention effective: yes  Time of last intervention: 1900 Reassessment Completed: yes   Other actions taken for pain:     Last 3 Weights:  Last 3 Recorded Weights in this Encounter    05/16/17 2223   Weight: 56.7 kg (125 lb 1.6 oz)   Weight change:     INTAKE/OUPUT    Current Shift:      Last three shifts: 05/19 0701 - 05/20 1900  In: 240 [P.O.:240]  Out: 200 [Urine:200]    RECOMMENDATIONS AND DISCHARGE PLANNING  Patient needs and requests:     Pending tests/procedures:      Discharge plan for patient:     Discharge planning Needs or Barriers:     Estimated Discharge Date:  Posted on Whiteboard in Patients Room: yes       \"HEALS\" SAFETY CHECK  A safety check occurred in the patient's room between off going nurse and oncoming nurse listed above.     The safety check included the below items:    H  High Alert Medications Verify all high alert medication drips (heparin, PCA, etc.)  E  Equipment Suction is set up for ALL patients (with yanker)  Red plugs utilized for all equipment (IV pumps, etc.)  WOWs wiped down at end of shift. Room stocked with oxygen, suction, and other unit-specific supplies  A  Alarms Bed alarm is set for fall risk patients  Ensure chair alarm is in place and activated if patient is up in a chair  L  Lines Check IV for any infiltration  Luong bag is empty if patient has a Luong   Tubing and IV bags are labeled  S  Safety  Room is clean, patient is clean, and equipment is clean. Hallways are clear from equipment besides carts. Fall bracelet on for fall risk patients  Ensure room is clear and free of clutter  Suction is set up for ALL patients (with fox)  Hallways are clear from equipment besides carts.    Isolation precautions followed, supplies available outside room, sign posted    Toy Beck RN

## 2017-05-20 NOTE — ROUTINE PROCESS
Bedside and Verbal shift change report given to Levy Tobar RN (oncoming nurse) by Seth Leavitt RN (offgoing nurse). Report included the following information SBAR, Kardex, MAR and Recent Results. SITUATION:  Code Status: DNR  Reason for Admission: Leukopenia  Abdominal pain  Hypokalemia  UTI (urinary tract infection)  Hospital day: 4  Problem List:       Hospital Problems  Date Reviewed: 5/17/2017          Codes Class Noted POA    Altered mental status, unspecified ICD-10-CM: R41.82  ICD-9-CM: 780.97  5/18/2017 Unknown        Abnormal CT of the abdomen ICD-10-CM: R93.5  ICD-9-CM: 793.6  5/17/2017 Yes        Leukopenia ICD-10-CM: D72.819  ICD-9-CM: 288.50  5/16/2017 Yes        UTI (urinary tract infection) ICD-10-CM: N39.0  ICD-9-CM: 599.0  5/16/2017 Yes        Abdominal pain ICD-10-CM: R10.9  ICD-9-CM: 789.00  5/16/2017 Yes        Hypokalemia ICD-10-CM: E87.6  ICD-9-CM: 276.8  8/17/2014 Yes              BACKGROUND:   Past Medical History:   Past Medical History:   Diagnosis Date    Acute diastolic heart failure (HCC)     Arthritis     Chest pain, unspecified     Noncardiac likely    Hyperlipidemia     Mitral valve disorders     Nocturia     Pneumonia     Transfusion history     No history of receiving blood or blood product transfusion(s).    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     Unspecified urinary incontinence     Urinary tract infection, site not specified       Patient taking anticoagulants no    Patient has a defibrillator: no    History of shots NO for example, flu, pneumonia, tetanus   Isolation History NO for example, MRSA, CDiff    ASSESSMENT:  Changes in Assessment Throughout Shift: NONE  Significant Changes in 24 hours (for example, RR/code, fall)  Patient has Central Line: no   Patient has Luong Cath: no   Mobility Issues  PT  IV Patency  OR Checklist  Pending Tests    Last Vitals:  Vitals w/ MEWS Score (last day)     Date/Time MEWS Score Pulse Resp Temp BP Level of Consciousness SpO2    05/20/17 0435 1 91 18 97.4 °F (36.3 °C) 118/62 Alert 98 %    05/20/17 0031 1 92 18 97.8 °F (36.6 °C) 108/69 Alert 100 %    05/19/17 2116 1 91 18 97.8 °F (36.6 °C) 110/65 Alert 96 %    05/19/17 1547 1 86 18 98.1 °F (36.7 °C) 119/62 Alert 92 %    05/19/17 1250 1 84 18 97.3 °F (36.3 °C) 104/62 Alert 97 %    05/19/17 0747 1 87 18 96.4 °F (35.8 °C) 125/75 Alert 100 %    05/19/17 0541 1 80 18 97.7 °F (36.5 °C) (!)  174/94 Alert 96 %    05/19/17 0115 1 87 18 97.9 °F (36.6 °C) 146/82 Alert 95 %            PAIN    Pain Assessment    Pain Intensity 1: 0 (05/20/17 0031)    Pain Location 1: Leg    Pain Intervention(s) 1: Medication (see MAR)    Patient Stated Pain Goal: 0  Intervention effective: yes  Time of last intervention:  Reassessment Completed: yes   Other actions taken for pain: DISTRACTION    Last 3 Weights:  Last 3 Recorded Weights in this Encounter    05/16/17 2223   Weight: 56.7 kg (125 lb 1.6 oz)   Weight change:     INTAKE/OUPUT    Current Shift:      Last three shifts: 05/18 1901 - 05/20 0700  In: 390 [P.O.:390]  Out: 200 [Urine:200]    RECOMMENDATIONS AND DISCHARGE PLANNING  Patient needs and requests: Comfort    Pending tests/procedures:      Discharge plan for patient: Home    Discharge planning Needs or Barriers: none    Estimated Discharge Date: 5/24/2017 Posted on Whiteboard in Patients Room: yes       \"HEALS\" SAFETY CHECK  A safety check occurred in the patient's room between off going nurse and oncoming nurse listed above. The safety check included the below items:    H  High Alert Medications Verify all high alert medication drips (heparin, PCA, etc.)  E  Equipment Suction is set up for ALL patients (with patriceker)  Red plugs utilized for all equipment (IV pumps, etc.)  WOWs wiped down at end of shift.   Room stocked with oxygen, suction, and other unit-specific supplies  A  Alarms Bed alarm is set for fall risk patients  Ensure chair alarm is in place and activated if patient is up in a chair  L  Lines Check IV for any infiltration  Luong bag is empty if patient has a Luong   Tubing and IV bags are labeled  S  Safety  Room is clean, patient is clean, and equipment is clean. Hallways are clear from equipment besides carts. Fall bracelet on for fall risk patients  Ensure room is clear and free of clutter  Suction is set up for ALL patients (with fox)  Hallways are clear from equipment besides carts.    Isolation precautions followed, supplies available outside room, sign posted    Shruthi Pal RN

## 2017-05-20 NOTE — PROGRESS NOTES
Progress Note    Patient: Rafa Montero MRN: [id number]  CSN: [id number]    YOB: 1917  Age: 80 y.o. Sex: female    DOA: 5/16/2017 LOS:  LOS: 4 days                    Subjective:     Pt is feeling better no c/o more alert abdominal pain improved pt has no IV off ABT urine culture negative . Continue motility agents to target gastroparesis. Pt on zofran and bentyl  consult to determine possible placement of the patient at [facility] following discharge. Will follow-up with . tsh slightly up free t4 is normal will give oral synthroid . BP improved on hydralazine prn         HPI:  Rafa Montero is a 80 y.o.  female with a history of heart failure, urinary incontinence, and arthritis who presented to the ER 05/16 via EMS for chronic abdominal pain. The patient reports she has had abdominal pain for many years, but the pain was much worse stating it \"felt like a BM\". CT was non specific, indicated moderate debris possibly due to gastroparesis. The patient denies chest pain, nausea, vomiting, and hematuria. She reports dizziness, weakness, and shortness of breath with activity. Pt has past surgical history of multiple abdominal surgeries. On 05/17, she reports feeling much better and reports her abdominal pain is less intense and localized to the RUQ. She is slightly confused, A&Ox2. Hospital Course:   CXR 5/16  Comparison: August 31, 2014      Findings: Fullness of the bilateral beba, right greater than left is similar to prior comparison. There is mild bibasilar atelectasis, left greater than right. There is no pleural effusion, pulmonary edema, or pneumothorax. The heart is mildly enlarged in size. There is increased density overlying the right arm.      IMPRESSION  Impression:  1. Mild bibasilar atelectasis, left greater than right. No definite infiltrate.      2. Fullness of the beba bilaterally is grossly stable from prior.      3.  Dense material overlying the region of the right arm. Correlate clinically for possibility of extravasated intravenous contrast from recent abdominal  pelvic CT.     CT 5/16  Moderate amount of ingested debris throughout the stomach. This is nonspecific. Correlate clinically for the possibility of gastroparesis.     Possible focus of circumferential wall thickening of the transverse colon. However, this could be artifactual and related to peristalsis but did appear similar on prior comparison. If clinically indicated, colonoscopy could further assess this finding.      Stable partially calcified liver cysts. No new suspicious liver lesion.      Status post cholecystectomy.      Stable renal cysts including a possibly mildly septated right renal cyst.      Dense atherosclerosis at the origin of the renal arteries bilateral.      Fibroid uterus.      Mild diverticulosis.     Mild ventral fat-containing hernia with minimal inflammation.      Additional chronic incidentals as above. Chief Complaint:   Chief Complaint   Patient presents with    Urinary Incontinence    Back Pain       Review of systems  Review of systems  GENERAL: Patient alert, awake and oriented times 3, not in distress. HEENT: Right eye blindness, no earache, tinnitus, sore throat or sinus congestion. NECK: No pain or stiffness. CARDIOVASCULAR: No chest pain or pressure. No palpitations. PULMONARY: No shortness of breath, cough or wheeze. GASTROINTESTINAL: Positive for abdominal pain. Denies nausea, diarrhea, vomiting melena or bright red blood per rectum. GENITOURINARY: Reports intermittent dysuria. No urinary frequency, urgency, hesitancy. MUSCULOSKELETAL: No joint or muscle pain, no back pain, no recent trauma. DERMATOLOGIC: No rash, no itching, no lesions. ENDOCRINE: No polyuria, polydipsia, no heat or cold intolerance. No recent change in weight. HEMATOLOGICAL: No anemia or easy bruising or bleeding.    NEUROLOGIC: Positive for dizziness. No headache, seizures, numbness, tingling or weakness. PSYCHIATRIC: Confusion improved. Objective:     Physical Exam:  Visit Vitals    /56 (BP 1 Location: Right arm, BP Patient Position: At rest)    Pulse 89    Temp 97.2 °F (36.2 °C)    Resp 20    Ht 5' 1\" (1.549 m)    Wt 56.7 kg (125 lb 1.6 oz)    SpO2 99%    Breastfeeding No    BMI 23.64 kg/m2        General:  A&Ox3, no distress. Head:  Normocephalic, without obvious abnormality, atraumatic. Eyes:  Right eye lens opacity. EOM intact left eye. Nose: Nares normal. No drainage or sinus tenderness. Throat: Lips, mucosa, and tongue dry. Teeth and gums normal.   Neck: Supple, symmetrical, trachea midline, no adenopathy, thyroid: no enlargement/tenderness/nodules, no carotid bruit and no JVD. Back:  ROM normal. No CVA tenderness. Lungs:  Clear to auscultation bilaterally. Chest wall:  No tenderness or deformity. Heart:  Regular rate and rhythm, S1, S2 normal, no murmur, click, rub or gallop. Abdomen: Tenderness to RUQ, epigastric. Bowel sounds present. Abdominal scar located around umbilicus from cholecystectomy. No masses, No organomegaly. No rigidity, no guarding. Extremities: Extremities normal, atraumatic, no cyanosis or edema. Pulses: 2+ and symmetric all extremities. Skin: Skin color, texture, turgor normal. No rashes or lesions   Neurologic: CNII-XII intact. No focal motor or sensory deficit.          Intake and Output:  Current Shift:     Last three shifts:  05/18 1901 - 05/20 0700  In: 390 [P.O.:390]  Out: 200 [Urine:200]    Labs: Results:       Chemistry Recent Labs      05/20/17   0400  05/19/17   0420   GLU  106*  90   NA  139  138   K  4.0  4.5   CL  103  105   CO2  30  26   BUN  21*  13   CREA  1.14  0.81   CA  8.9  8.8   AGAP  6  7   BUCR  18  16   AP   --   87   TP   --   7.6   ALB   --   2.8*   GLOB   --   4.8*   AGRAT   --   0.6*      CBC w/Diff Recent Labs      05/20/17   0400  05/19/17   0420   WBC 4.6  4.5*   RBC  3.96*  4.11*   HGB  11.3*  11.8*   HCT  33.6*  35.1   PLT  217  207   GRANS  46  41   LYMPH  42  44   EOS  4  6*      Cardiac Enzymes No results for input(s): CPK, CKND1, PASCUAL in the last 72 hours. No lab exists for component: CKRMB, TROIP   Coagulation No results for input(s): PTP, INR, APTT in the last 72 hours. No lab exists for component: INREXT, INREXT    Lipid Panel Lab Results   Component Value Date/Time    Cholesterol, total 228 02/16/2017 03:25 PM    HDL Cholesterol 79 02/16/2017 03:25 PM    LDL, calculated 124 02/16/2017 03:25 PM    VLDL, calculated 29.8 06/27/2016 01:03 PM    Triglyceride 124 02/16/2017 03:25 PM    CHOL/HDL Ratio 3.0 06/27/2016 01:03 PM      BNP No results for input(s): BNPP in the last 72 hours.    Liver Enzymes Recent Labs      05/19/17   0420   TP  7.6   ALB  2.8*   AP  87   SGOT  32      Thyroid Studies Lab Results   Component Value Date/Time    TSH 7.44 05/19/2017 02:05 PM          Procedures/imaging: see electronic medical records for all procedures/Xrays and details which were not copied into this note but were reviewed prior to creation of Plan    Medications:   Current Facility-Administered Medications   Medication Dose Route Frequency    LORazepam (ATIVAN) tablet 0.5 mg  0.5 mg Oral TID PRN    polyethylene glycol (MIRALAX) packet 17 g  17 g Oral DAILY    ondansetron (ZOFRAN ODT) tablet 4 mg  4 mg Oral Q8H PRN    dicyclomine (BENTYL) 10 mg/5 mL oral solution 10 mg  10 mg Oral QID    risperiDONE (RisperDAL) tablet 0.25 mg  0.25 mg Oral QHS    hydrALAZINE (APRESOLINE) tablet 25 mg  25 mg Oral TID PRN    bisacodyl (DULCOLAX) suppository 10 mg  10 mg Rectal DAILY PRN    famotidine (PF) (PEPCID) 20 mg in sodium chloride 0.9 % 10 mL injection  20 mg IntraVENous DAILY    magnesium hydroxide (MILK OF MAGNESIA) 400 mg/5 mL oral suspension 30 mL  30 mL Oral DAILY PRN    cefTRIAXone (ROCEPHIN) 1 g in 0.9% sodium chloride (MBP/ADV) 50 mL MBP  1 g IntraVENous Q24H    hydrALAZINE (APRESOLINE) 20 mg/mL injection 10 mg  10 mg IntraVENous Q6H PRN    naloxegol (MOVANTIK) tablet 12.5 mg  12.5 mg Oral ACB       Assessment/Plan     Active Problems:    Hypokalemia (8/17/2014)      Leukopenia (5/16/2017)      UTI (urinary tract infection) (5/16/2017)      Abdominal pain (5/16/2017)      Abnormal CT of the abdomen (5/17/2017)      Altered mental status, unspecified (5/18/2017)    Plan      Abdominal Pain/ Gastroparesis possible colitis  1. D/C Reglan, continue Movantik   2. D/C morphine 2mg PRN, reassess tomorrow  3. Begin Zofran 4mg, Miralax, Bentyl  4. Continue conservative management per GI consult recommendation  5. Patient has not had diarrhea, D/C C-diff   6. Cardiac enzymes negative, EKG shows normal sinus rhythm, continue to monitor    7 might use erythromycin for gastroparesis  Possible cystitis  1. D/C ABT f/u urine culture  2. If culture is negative  3. Monitor for possible colitis       Dementia / deconditioning   1. F/U palliative care, family meeting discussing continued care following discharge  2. F/U  possible placement at [facility]  Pt and ot ordered today     Elevated blood pressure  1. BP is controlled this morning  Administer hydralazine oral or IV prn  if SBP >170  2. Continue to monitor  3 might add Norvasc 2.5 daily      Agitation/Delirum   1. Ativan PO PRN, Risperdone 0.25 mg qhs   2.  Continue to monitor     Hypokalemia   Oral potassium prn  Will continue monitor      Hypothyroid  Synthroid 25 mg daily   F/u tsh and free t4        DVT/GI Prophylaxis: SCD's, Kalyan Fox MD  5/20/2017 12:51 PM

## 2017-05-21 LAB
ANION GAP BLD CALC-SCNC: 9 MMOL/L (ref 3–18)
BASOPHILS # BLD AUTO: 0 K/UL (ref 0–0.06)
BASOPHILS # BLD: 0 % (ref 0–2)
BUN SERPL-MCNC: 26 MG/DL (ref 7–18)
BUN/CREAT SERPL: 19 (ref 12–20)
CALCIUM SERPL-MCNC: 8.6 MG/DL (ref 8.5–10.1)
CHLORIDE SERPL-SCNC: 102 MMOL/L (ref 100–108)
CO2 SERPL-SCNC: 23 MMOL/L (ref 21–32)
CREAT SERPL-MCNC: 1.37 MG/DL (ref 0.6–1.3)
DIFFERENTIAL METHOD BLD: ABNORMAL
EOSINOPHIL # BLD: 0.3 K/UL (ref 0–0.4)
EOSINOPHIL NFR BLD: 7 % (ref 0–5)
ERYTHROCYTE [DISTWIDTH] IN BLOOD BY AUTOMATED COUNT: 16.3 % (ref 11.6–14.5)
GLUCOSE SERPL-MCNC: 100 MG/DL (ref 74–99)
HCT VFR BLD AUTO: 36.9 % (ref 35–45)
HGB BLD-MCNC: 12 G/DL (ref 12–16)
LYMPHOCYTES # BLD AUTO: 38 % (ref 21–52)
LYMPHOCYTES # BLD: 1.6 K/UL (ref 0.9–3.6)
MAGNESIUM SERPL-MCNC: 2.4 MG/DL (ref 1.6–2.6)
MCH RBC QN AUTO: 28.2 PG (ref 24–34)
MCHC RBC AUTO-ENTMCNC: 32.5 G/DL (ref 31–37)
MCV RBC AUTO: 86.6 FL (ref 74–97)
MONOCYTES # BLD: 0.4 K/UL (ref 0.05–1.2)
MONOCYTES NFR BLD AUTO: 8 % (ref 3–10)
NEUTS SEG # BLD: 2 K/UL (ref 1.8–8)
NEUTS SEG NFR BLD AUTO: 47 % (ref 40–73)
PLATELET # BLD AUTO: 202 K/UL (ref 135–420)
PMV BLD AUTO: 10.2 FL (ref 9.2–11.8)
POTASSIUM SERPL-SCNC: 4.7 MMOL/L (ref 3.5–5.5)
RBC # BLD AUTO: 4.26 M/UL (ref 4.2–5.3)
SODIUM SERPL-SCNC: 134 MMOL/L (ref 136–145)
WBC # BLD AUTO: 4.3 K/UL (ref 4.6–13.2)

## 2017-05-21 PROCEDURE — 80048 BASIC METABOLIC PNL TOTAL CA: CPT | Performed by: FAMILY MEDICINE

## 2017-05-21 PROCEDURE — 85025 COMPLETE CBC W/AUTO DIFF WBC: CPT | Performed by: FAMILY MEDICINE

## 2017-05-21 PROCEDURE — 74011250636 HC RX REV CODE- 250/636: Performed by: FAMILY MEDICINE

## 2017-05-21 PROCEDURE — 74011250637 HC RX REV CODE- 250/637: Performed by: FAMILY MEDICINE

## 2017-05-21 PROCEDURE — 83735 ASSAY OF MAGNESIUM: CPT | Performed by: FAMILY MEDICINE

## 2017-05-21 PROCEDURE — 65270000029 HC RM PRIVATE

## 2017-05-21 PROCEDURE — 36415 COLL VENOUS BLD VENIPUNCTURE: CPT | Performed by: FAMILY MEDICINE

## 2017-05-21 PROCEDURE — 74011000258 HC RX REV CODE- 258: Performed by: FAMILY MEDICINE

## 2017-05-21 PROCEDURE — 74011000250 HC RX REV CODE- 250: Performed by: FAMILY MEDICINE

## 2017-05-21 RX ADMIN — DICYCLOMINE HYDROCHLORIDE 10 MG: 10 SOLUTION ORAL at 10:45

## 2017-05-21 RX ADMIN — NALOXEGOL OXALATE 12.5 MG: 12.5 TABLET, FILM COATED ORAL at 10:38

## 2017-05-21 RX ADMIN — FAMOTIDINE 20 MG: 10 INJECTION, SOLUTION INTRAVENOUS at 10:39

## 2017-05-21 RX ADMIN — DICYCLOMINE HYDROCHLORIDE 10 MG: 10 SOLUTION ORAL at 17:15

## 2017-05-21 RX ADMIN — RISPERIDONE 0.25 MG: 0.25 TABLET ORAL at 23:01

## 2017-05-21 RX ADMIN — DICYCLOMINE HYDROCHLORIDE 10 MG: 10 SOLUTION ORAL at 23:01

## 2017-05-21 RX ADMIN — DICYCLOMINE HYDROCHLORIDE 10 MG: 10 SOLUTION ORAL at 13:50

## 2017-05-21 RX ADMIN — LEVOTHYROXINE SODIUM 25 MCG: 25 TABLET ORAL at 10:38

## 2017-05-21 RX ADMIN — CEFTRIAXONE SODIUM 1 G: 1 INJECTION, POWDER, FOR SOLUTION INTRAMUSCULAR; INTRAVENOUS at 16:48

## 2017-05-21 NOTE — ROUTINE PROCESS
Bedside and Verbal shift change report given to Faby Aden RN (oncoming nurse) by Corey Bray RN (offgoing nurse). Report included the following information SBAR, Kardex, MAR and Recent Results. SITUATION:  Code Status: DNR  Reason for Admission: Leukopenia  Abdominal pain  Hypokalemia  UTI (urinary tract infection)  Hospital day: 5  Problem List:       Hospital Problems  Date Reviewed: 5/17/2017          Codes Class Noted POA    Altered mental status, unspecified ICD-10-CM: R41.82  ICD-9-CM: 780.97  5/18/2017 Unknown        Abnormal CT of the abdomen ICD-10-CM: R93.5  ICD-9-CM: 793.6  5/17/2017 Yes        Leukopenia ICD-10-CM: D72.819  ICD-9-CM: 288.50  5/16/2017 Yes        UTI (urinary tract infection) ICD-10-CM: N39.0  ICD-9-CM: 599.0  5/16/2017 Yes        Abdominal pain ICD-10-CM: R10.9  ICD-9-CM: 789.00  5/16/2017 Yes        Hypokalemia ICD-10-CM: E87.6  ICD-9-CM: 276.8  8/17/2014 Yes              BACKGROUND:   Past Medical History:   Past Medical History:   Diagnosis Date    Acute diastolic heart failure (HCC)     Arthritis     Chest pain, unspecified     Noncardiac likely    Hyperlipidemia     Mitral valve disorders     Nocturia     Pneumonia     Transfusion history     No history of receiving blood or blood product transfusion(s).    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     Unspecified urinary incontinence     Urinary tract infection, site not specified       Patient taking anticoagulants no    Patient has a defibrillator: no    History of shots NO for example, flu, pneumonia, tetanus   Isolation History YES for example, MRSA, CDiff    ASSESSMENT:  Changes in Assessment Throughout Shift: none  Significant Changes in 24 hours (for example, RR/code, fall)  Patient has Central Line: no   Patient has Luong Cath: no   Mobility Issues  PT  IV Patency  OR Checklist  Pending Tests    Last Vitals:  Vitals w/ MEWS Score (last day)     Date/Time MEWS Score Pulse Resp Temp BP Level of Consciousness SpO2    05/21/17 0442 -- 84 18 96.4 °F (35.8 °C) 100/61 -- 98 %    05/20/17 2332 -- 88 18 96.8 °F (36 °C) 102/63 -- 98 %    05/20/17 2028 -- 62 19 97 °F (36.1 °C) 126/70 -- 98 %    05/20/17 1619 1 90 19 96.4 °F (35.8 °C) 108/69 Alert 97 %    05/20/17 1200 2 95 18 97 °F (36.1 °C) 96/61 Alert 98 %    05/20/17 0841 2 89 20 97.2 °F (36.2 °C) 102/56 Responds to Voice 99 %    05/20/17 0435 1 91 18 97.4 °F (36.3 °C) 118/62 Alert 98 %    05/20/17 0031 1 92 18 97.8 °F (36.6 °C) 108/69 Alert 100 %            PAIN    Pain Assessment    Pain Intensity 1: 0 (05/21/17 0442)    Pain Location 1: Leg    Pain Intervention(s) 1: Medication (see MAR)    Patient Stated Pain Goal: 0  Intervention effective: yes  Time of last intervention:  Reassessment Completed: yes   Other actions taken for pain: Distraction    Last 3 Weights:  Last 3 Recorded Weights in this Encounter    05/16/17 2223   Weight: 56.7 kg (125 lb 1.6 oz)   Weight change:     INTAKE/OUPUT    Current Shift:      Last three shifts: 05/19 1901 - 05/21 0700  In: 240 [P.O.:240]  Out: 200 [Urine:200]    RECOMMENDATIONS AND DISCHARGE PLANNING  Patient needs and requests: Safety, nourishment    Pending tests/procedures:      Discharge plan for patient: Home    Discharge planning Needs or Barriers: none    Estimated Discharge Date: 5/26/2017 Posted on Whiteboard in Patients Room: yes       \"HEALS\" SAFETY CHECK  A safety check occurred in the patient's room between off going nurse and oncoming nurse listed above. The safety check included the below items:    H  High Alert Medications Verify all high alert medication drips (heparin, PCA, etc.)  E  Equipment Suction is set up for ALL patients (with yanker)  Red plugs utilized for all equipment (IV pumps, etc.)  WOWs wiped down at end of shift.   Room stocked with oxygen, suction, and other unit-specific supplies  A  Alarms Bed alarm is set for fall risk patients  Ensure chair alarm is in place and activated if patient is up in a chair  L  Lines Check IV for any infiltration  Luong bag is empty if patient has a Luong   Tubing and IV bags are labeled  S  Safety  Room is clean, patient is clean, and equipment is clean. Hallways are clear from equipment besides carts. Fall bracelet on for fall risk patients  Ensure room is clear and free of clutter  Suction is set up for ALL patients (with yanker)  Hallways are clear from equipment besides carts.    Isolation precautions followed, supplies available outside room, sign posted    Shaaron Holstein, RN

## 2017-05-21 NOTE — PROGRESS NOTES
Progress Note    Patient: Rita Edgar MRN: [id number]  CSN: [id number]    YOB: 1917  Age: 80 y.o. Sex: female    DOA: 5/16/2017 LOS:  LOS: 5 days                    Subjective:     Patient reports feeling better, does not report abdominal pain. Discussed with nursing staff to assist patient with eating. Nursing staff reports intermittent confusion during the day and report patient had bowel movement today. Will follow up with  consult to determine placement of patient at [facility] following discharge. HPI:  Rita Edgar is a 80 y.o.  female with a history of heart failure, urinary incontinence, and arthritis who presented to the ER 05/16 via EMS for chronic abdominal pain. The patient reports she has had abdominal pain for many years, but the pain was much worse stating it \"felt like a BM\". CT was non specific, indicated moderate debris possibly due to gastroparesis. The patient denies chest pain, nausea, vomiting, and hematuria. She reports dizziness, weakness, and shortness of breath with activity. Pt has past surgical history of multiple abdominal surgeries. On 05/17, she reports feeling much better and reports her abdominal pain is less intense and localized to the RUQ. She is slightly confused, A&Ox2.      Hospital Course:   CXR 5/16  Comparison: August 31, 2014      Findings: Fullness of the bilateral beba, right greater than left is similar to prior comparison. There is mild bibasilar atelectasis, left greater than right. There is no pleural effusion, pulmonary edema, or pneumothorax. The heart is mildly enlarged in size. There is increased density overlying the right arm.      IMPRESSION  Impression:  1. Mild bibasilar atelectasis, left greater than right. No definite infiltrate.      2. Fullness of the beba bilaterally is grossly stable from prior.      3. Dense material overlying the region of the right arm.  Correlate clinically for possibility of extravasated intravenous contrast from recent abdominal  pelvic CT.      CT 5/16  Moderate amount of ingested debris throughout the stomach. This is nonspecific. Correlate clinically for the possibility of gastroparesis.     Possible focus of circumferential wall thickening of the transverse colon. However, this could be artifactual and related to peristalsis but did appear similar on prior comparison. If clinically indicated, colonoscopy could further assess this finding.      Stable partially calcified liver cysts. No new suspicious liver lesion.      Status post cholecystectomy.      Stable renal cysts including a possibly mildly septated right renal cyst.      Dense atherosclerosis at the origin of the renal arteries bilateral.      Fibroid uterus.      Mild diverticulosis.     Mild ventral fat-containing hernia with minimal inflammation.      Additional chronic incidentals as above. Chief Complaint:   Chief Complaint   Patient presents with    Urinary Incontinence    Back Pain       Review of systems  General: No fevers or chills. Awake and alert. Cardiovascular: No chest pain or pressure. No palpitations. Pulmonary: No shortness of breath, cough or wheeze. Gastrointestinal: No abdominal pain, nausea, vomiting or diarrhea. Genitourinary: No urinary frequency, urgency, hesitancy or dysuria. Musculoskeletal: No joint or muscle pain, no back pain, no recent trauma. Neurologic: No headache, numbness, tingling or weakness. Objective:     Physical Exam:  Visit Vitals    /68 (BP 1 Location: Left arm, BP Patient Position: At rest)    Pulse 92    Temp 97 °F (36.1 °C)    Resp 17    Ht 5' 1\" (1.549 m)    Wt 56.7 kg (125 lb 1.6 oz)    SpO2 95%    Breastfeeding No    BMI 23.64 kg/m2        General:         Alert, no acute distress    HEENT: NC, Atraumatic. PERRLA, anicteric sclerae. Right eye blindness. Lungs: CTA Bilaterally. No Wheezing/Rhonchi/Rales.   Heart:  Regular  rhythm,  No murmur, No Rubs, No Gallops  Abdomen: Tenderness to epigastrium.  +Bowel sounds, no HSM  Extremities: No c/c/e  Psych:   Not anxious or agitated. Neurologic:  CN 2-12 grossly intact, Alert and oriented X 2. No acute neurological Deficits,     Intake and Output:  Current Shift:     Last three shifts:  05/19 1901 - 05/21 0700  In: 240 [P.O.:240]  Out: 200 [Urine:200]    Labs: Results:       Chemistry Recent Labs      05/21/17   0540  05/20/17   0400  05/19/17   0420   GLU  100*  106*  90   NA  134*  139  138   K  4.7  4.0  4.5   CL  102  103  105   CO2  23  30  26   BUN  26*  21*  13   CREA  1.37*  1.14  0.81   CA  8.6  8.9  8.8   AGAP  9  6  7   BUCR  19  18  16   AP   --    --   87   TP   --    --   7.6   ALB   --    --   2.8*   GLOB   --    --   4.8*   AGRAT   --    --   0.6*      CBC w/Diff Recent Labs      05/20/17   0400  05/19/17   0420   WBC  4.6  4.5*   RBC  3.96*  4.11*   HGB  11.3*  11.8*   HCT  33.6*  35.1   PLT  217  207   GRANS  46  41   LYMPH  42  44   EOS  4  6*      Cardiac Enzymes No results for input(s): CPK, CKND1, PASCUAL in the last 72 hours. No lab exists for component: CKRMB, TROIP   Coagulation No results for input(s): PTP, INR, APTT in the last 72 hours. No lab exists for component: INREXT, INREXT    Lipid Panel Lab Results   Component Value Date/Time    Cholesterol, total 228 02/16/2017 03:25 PM    HDL Cholesterol 79 02/16/2017 03:25 PM    LDL, calculated 124 02/16/2017 03:25 PM    VLDL, calculated 29.8 06/27/2016 01:03 PM    Triglyceride 124 02/16/2017 03:25 PM    CHOL/HDL Ratio 3.0 06/27/2016 01:03 PM      BNP No results for input(s): BNPP in the last 72 hours.    Liver Enzymes Recent Labs      05/19/17   0420   TP  7.6   ALB  2.8*   AP  87   SGOT  32      Thyroid Studies Lab Results   Component Value Date/Time    TSH 7.44 05/19/2017 02:05 PM          Procedures/imaging: see electronic medical records for all procedures/Xrays and details which were not copied into this note but were reviewed prior to creation of Plan    Medications:   Current Facility-Administered Medications   Medication Dose Route Frequency    levothyroxine (SYNTHROID) tablet 25 mcg  25 mcg Oral ACB    LORazepam (ATIVAN) tablet 0.5 mg  0.5 mg Oral TID PRN    polyethylene glycol (MIRALAX) packet 17 g  17 g Oral DAILY    ondansetron (ZOFRAN ODT) tablet 4 mg  4 mg Oral Q8H PRN    dicyclomine (BENTYL) 10 mg/5 mL oral solution 10 mg  10 mg Oral QID    risperiDONE (RisperDAL) tablet 0.25 mg  0.25 mg Oral QHS    hydrALAZINE (APRESOLINE) tablet 25 mg  25 mg Oral TID PRN    bisacodyl (DULCOLAX) suppository 10 mg  10 mg Rectal DAILY PRN    famotidine (PF) (PEPCID) 20 mg in sodium chloride 0.9 % 10 mL injection  20 mg IntraVENous DAILY    magnesium hydroxide (MILK OF MAGNESIA) 400 mg/5 mL oral suspension 30 mL  30 mL Oral DAILY PRN    cefTRIAXone (ROCEPHIN) 1 g in 0.9% sodium chloride (MBP/ADV) 50 mL MBP  1 g IntraVENous Q24H    hydrALAZINE (APRESOLINE) 20 mg/mL injection 10 mg  10 mg IntraVENous Q6H PRN    naloxegol (MOVANTIK) tablet 12.5 mg  12.5 mg Oral ACB       Assessment/Plan     Active Problems:    Hypokalemia (8/17/2014)      Leukopenia (5/16/2017)      UTI (urinary tract infection) (5/16/2017)      Abdominal pain (5/16/2017)      Abnormal CT of the abdomen (5/17/2017)      Altered mental status, unspecified (5/18/2017)    Plan      Abdominal Pain/ Gastroparesis possible colitis  1. D/C Reglan, continue Movantik   2. D/C morphine 2mg PRN, reassess tomorrow  3. Continue Zofran 4mg, Miralax, Bentyl  4. Continue conservative management per GI consult recommendation  5. Cardiac enzymes negative, EKG shows normal sinus rhythm, continue to monitor   6. might use erythromycin for gastroparesis    Possible cystitis  1. Continue Rocephin, urine culture is negative, continue to monitor, will d/c rocephin if culture remains negative  2. Monitor for possible colitis       Dementia / deconditioning   1.  F/U palliative care, family meeting tomorrow discussing continued care following discharge  2. F/U  possible placement at [facility]  3. F/U PT and OT consult       Elevated blood pressure  1. BP is controlled this morning;  Administer hydralazine oral or IV prn if SBP >170  2. Continue to monitor, consider Norvasc 2.5mg if not well controlled        Agitation/Delirum   1. Ativan PO PRN, Risperdone 0.25 mg qhs   2. Continue to monitor      Hypokalemia   1. Potassium levels back to normal  2. Oral potassium prn, Will continue monitor      Hypothyroid  1. Synthroid 25 mg daily   2.  F/U TSH and T4    Cbc, bmp in AM  F/u  in AM  D/c rocephin if urine culture remains negative  Might use erythromycin oral for gastroparesis    DVT/GI Prophylaxis: SCD and Pepcid    Kavita Martin MD   5/21/2017 12:15 PM

## 2017-05-21 NOTE — ROUTINE PROCESS
Bedside and Verbal shift change report given to Lester Mcgowan RN  (oncoming nurse) by Jed Palomo RN (offgoing nurse). Report included the following information SBAR, Kardex, MAR and Recent Results. SITUATION:  Code Status: DNR  Reason for Admission: Leukopenia  Abdominal pain  Hypokalemia  UTI (urinary tract infection)  Hospital day: 5  Problem List:       Hospital Problems  Date Reviewed: 5/17/2017          Codes Class Noted POA    Altered mental status, unspecified ICD-10-CM: R41.82  ICD-9-CM: 780.97  5/18/2017 Unknown        Abnormal CT of the abdomen ICD-10-CM: R93.5  ICD-9-CM: 793.6  5/17/2017 Yes        Leukopenia ICD-10-CM: D72.819  ICD-9-CM: 288.50  5/16/2017 Yes        UTI (urinary tract infection) ICD-10-CM: N39.0  ICD-9-CM: 599.0  5/16/2017 Yes        Abdominal pain ICD-10-CM: R10.9  ICD-9-CM: 789.00  5/16/2017 Yes        Hypokalemia ICD-10-CM: E87.6  ICD-9-CM: 276.8  8/17/2014 Yes              BACKGROUND:   Past Medical History:   Past Medical History:   Diagnosis Date    Acute diastolic heart failure (HCC)     Arthritis     Chest pain, unspecified     Noncardiac likely    Hyperlipidemia     Mitral valve disorders     Nocturia     Pneumonia     Transfusion history     No history of receiving blood or blood product transfusion(s). 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     Unspecified urinary incontinence     Urinary tract infection, site not specified       Patient taking anticoagulants yes    Patient has a defibrillator: no    History of shots NO for example, flu, pneumonia, tetanus   Isolation History YES for example, MRSA, CDiff    ASSESSMENT:  Changes in Assessment Throughout Shift: None  Significant Changes in 24 hours (for example, RR/code, fall)  Patient has Central Line: no Reasons if yes:   Patient has Luong Cath: no Reasons if yes:     Mobility Issues  PT  IV Patency  OR Checklist  Pending Tests    Last Vitals:  Vitals w/ MEWS Score (last day)     Date/Time MEWS Score Pulse Resp Temp BP Level of Consciousness SpO2    05/21/17 1600 1 77 20 97 °F (36.1 °C) 108/68 Alert 96 %    05/21/17 1251 1 80 18 97.5 °F (36.4 °C) 111/67 Alert 99 %    05/21/17 0831 2 92 17 97 °F (36.1 °C) 139/68 Responds to Voice 95 %    05/21/17 0442 -- 84 18 96.4 °F (35.8 °C) 100/61 -- 98 %    05/20/17 2332 -- 88 18 96.8 °F (36 °C) 102/63 -- 98 %    05/20/17 2028 -- 62 19 97 °F (36.1 °C) 126/70 -- 98 %    05/20/17 1619 1 90 19 96.4 °F (35.8 °C) 108/69 Alert 97 %    05/20/17 1200 2 95 18 97 °F (36.1 °C) 96/61 Alert 98 %    05/20/17 0841 2 89 20 97.2 °F (36.2 °C) 102/56 Responds to Voice 99 %    05/20/17 0435 1 91 18 97.4 °F (36.3 °C) 118/62 Alert 98 %    05/20/17 0031 1 92 18 97.8 °F (36.6 °C) 108/69 Alert 100 %            PAIN    Pain Assessment    Pain Intensity 1: 0 (05/21/17 1600)    Pain Location 1: Leg    Pain Intervention(s) 1: Medication (see MAR)    Patient Stated Pain Goal: 0  Intervention effective: yes  Time of last intervention: 1900 Reassessment Completed: yes   Other actions taken for pain:     Last 3 Weights:  Last 3 Recorded Weights in this Encounter    05/16/17 2223   Weight: 56.7 kg (125 lb 1.6 oz)   Weight change:     INTAKE/OUPUT    Current Shift: 05/21 0701 - 05/21 1900  In: 200 [P.O.:200]  Out: 0     Last three shifts: 05/19 1901 - 05/21 0700  In: 240 [P.O.:240]  Out: 200 [Urine:200]    RECOMMENDATIONS AND DISCHARGE PLANNING  Patient needs and requests:     Pending tests/procedures:      Discharge plan for patient:     Discharge planning Needs or Barriers:     Estimated Discharge Date:  Posted on Whiteboard in Patients Room: yes       \"HEALS\" SAFETY CHECK  A safety check occurred in the patient's room between off going nurse and oncoming nurse listed above.     The safety check included the below items:    H  High Alert Medications Verify all high alert medication drips (heparin, PCA, etc.)  E  Equipment Suction is set up for ALL patients (with fox)  Red plugs utilized for all equipment (IV pumps, etc.)  WOWs wiped down at end of shift. Room stocked with oxygen, suction, and other unit-specific supplies  A  Alarms Bed alarm is set for fall risk patients  Ensure chair alarm is in place and activated if patient is up in a chair  L  Lines Check IV for any infiltration  Luong bag is empty if patient has a Luong   Tubing and IV bags are labeled  S  Safety  Room is clean, patient is clean, and equipment is clean. Hallways are clear from equipment besides carts. Fall bracelet on for fall risk patients  Ensure room is clear and free of clutter  Suction is set up for ALL patients (with fox)  Hallways are clear from equipment besides carts.    Isolation precautions followed, supplies available outside room, sign posted    Do Bruno RN

## 2017-05-21 NOTE — PROGRESS NOTES
Progress Note    Patient: Catarina Turpin MRN: [id number]  CSN: [id number]    YOB: 1917  Age: 80 y.o. Sex: female    DOA: 5/16/2017 LOS:  LOS: 5 days                    Subjective:     Patient reports feeling better, does not report abdominal pain. Discussed with nursing staff to assist patient with eating. Nursing staff reports intermittent confusion during the day and report patient had bowel movement today. Will follow up with  consult to determine placement of patient at [facility] following discharge. HPI:  Catarina Turpin is a 80 y.o.  female with a history of heart failure, urinary incontinence, and arthritis who presented to the ER 05/16 via EMS for chronic abdominal pain. The patient reports she has had abdominal pain for many years, but the pain was much worse stating it \"felt like a BM\". CT was non specific, indicated moderate debris possibly due to gastroparesis. The patient denies chest pain, nausea, vomiting, and hematuria. She reports dizziness, weakness, and shortness of breath with activity. Pt has past surgical history of multiple abdominal surgeries. On 05/17, she reports feeling much better and reports her abdominal pain is less intense and localized to the RUQ. She is slightly confused, A&Ox2.      Hospital Course:   CXR 5/16  Comparison: August 31, 2014      Findings: Fullness of the bilateral beba, right greater than left is similar to prior comparison. There is mild bibasilar atelectasis, left greater than right. There is no pleural effusion, pulmonary edema, or pneumothorax. The heart is mildly enlarged in size. There is increased density overlying the right arm.      IMPRESSION  Impression:  1. Mild bibasilar atelectasis, left greater than right. No definite infiltrate.      2. Fullness of the beba bilaterally is grossly stable from prior.      3. Dense material overlying the region of the right arm.  Correlate clinically for possibility of extravasated intravenous contrast from recent abdominal  pelvic CT.      CT 5/16  Moderate amount of ingested debris throughout the stomach. This is nonspecific. Correlate clinically for the possibility of gastroparesis.     Possible focus of circumferential wall thickening of the transverse colon. However, this could be artifactual and related to peristalsis but did appear similar on prior comparison. If clinically indicated, colonoscopy could further assess this finding.      Stable partially calcified liver cysts. No new suspicious liver lesion.      Status post cholecystectomy.      Stable renal cysts including a possibly mildly septated right renal cyst.      Dense atherosclerosis at the origin of the renal arteries bilateral.      Fibroid uterus.      Mild diverticulosis.     Mild ventral fat-containing hernia with minimal inflammation.      Additional chronic incidentals as above. Chief Complaint:   Chief Complaint   Patient presents with    Urinary Incontinence    Back Pain       Review of systems  General: No fevers or chills. Awake and alert. Cardiovascular: No chest pain or pressure. No palpitations. Pulmonary: No shortness of breath, cough or wheeze. Gastrointestinal: No abdominal pain, nausea, vomiting or diarrhea. Genitourinary: No urinary frequency, urgency, hesitancy or dysuria. Musculoskeletal: No joint or muscle pain, no back pain, no recent trauma. Neurologic: No headache, numbness, tingling or weakness. Objective:     Physical Exam:  Visit Vitals    /68 (BP 1 Location: Left arm, BP Patient Position: At rest)    Pulse 92    Temp 97 °F (36.1 °C)    Resp 17    Ht 5' 1\" (1.549 m)    Wt 125 lb 1.6 oz (56.7 kg)    SpO2 95%    Breastfeeding No    BMI 23.64 kg/m2        General:         Alert, no acute distress    HEENT: NC, Atraumatic. PERRLA, anicteric sclerae. Right eye blindness. Lungs: CTA Bilaterally. No Wheezing/Rhonchi/Rales.   Heart:  Regular  rhythm,  No murmur, No Rubs, No Gallops  Abdomen: Tenderness to epigastrium.  +Bowel sounds, no HSM  Extremities: No c/c/e  Psych:   Not anxious or agitated. Neurologic:  CN 2-12 grossly intact, Alert and oriented X 2. No acute neurological Deficits,     Intake and Output:  Current Shift:     Last three shifts:  05/19 1901 - 05/21 0700  In: 240 [P.O.:240]  Out: 200 [Urine:200]    Labs: Results:       Chemistry Recent Labs      05/21/17   0540  05/20/17   0400  05/19/17   0420   GLU  100*  106*  90   NA  134*  139  138   K  4.7  4.0  4.5   CL  102  103  105   CO2  23  30  26   BUN  26*  21*  13   CREA  1.37*  1.14  0.81   CA  8.6  8.9  8.8   AGAP  9  6  7   BUCR  19  18  16   AP   --    --   87   TP   --    --   7.6   ALB   --    --   2.8*   GLOB   --    --   4.8*   AGRAT   --    --   0.6*      CBC w/Diff Recent Labs      05/20/17   0400 05/19/17   0420   WBC  4.6  4.5*   RBC  3.96*  4.11*   HGB  11.3*  11.8*   HCT  33.6*  35.1   PLT  217  207   GRANS  46  41   LYMPH  42  44   EOS  4  6*      Cardiac Enzymes No results for input(s): CPK, CKND1, PASCUAL in the last 72 hours. No lab exists for component: CKRMB, TROIP   Coagulation No results for input(s): PTP, INR, APTT in the last 72 hours. No lab exists for component: INREXT    Lipid Panel Lab Results   Component Value Date/Time    Cholesterol, total 228 02/16/2017 03:25 PM    HDL Cholesterol 79 02/16/2017 03:25 PM    LDL, calculated 124 02/16/2017 03:25 PM    VLDL, calculated 29.8 06/27/2016 01:03 PM    Triglyceride 124 02/16/2017 03:25 PM    CHOL/HDL Ratio 3.0 06/27/2016 01:03 PM      BNP No results for input(s): BNPP in the last 72 hours.    Liver Enzymes Recent Labs      05/19/17   0420   TP  7.6   ALB  2.8*   AP  87   SGOT  32      Thyroid Studies Lab Results   Component Value Date/Time    TSH 7.44 05/19/2017 02:05 PM          Procedures/imaging: see electronic medical records for all procedures/Xrays and details which were not copied into this note but were reviewed prior to creation of Plan    Medications:   Current Facility-Administered Medications   Medication Dose Route Frequency    levothyroxine (SYNTHROID) tablet 25 mcg  25 mcg Oral ACB    LORazepam (ATIVAN) tablet 0.5 mg  0.5 mg Oral TID PRN    polyethylene glycol (MIRALAX) packet 17 g  17 g Oral DAILY    ondansetron (ZOFRAN ODT) tablet 4 mg  4 mg Oral Q8H PRN    dicyclomine (BENTYL) 10 mg/5 mL oral solution 10 mg  10 mg Oral QID    risperiDONE (RisperDAL) tablet 0.25 mg  0.25 mg Oral QHS    hydrALAZINE (APRESOLINE) tablet 25 mg  25 mg Oral TID PRN    bisacodyl (DULCOLAX) suppository 10 mg  10 mg Rectal DAILY PRN    famotidine (PF) (PEPCID) 20 mg in sodium chloride 0.9 % 10 mL injection  20 mg IntraVENous DAILY    magnesium hydroxide (MILK OF MAGNESIA) 400 mg/5 mL oral suspension 30 mL  30 mL Oral DAILY PRN    cefTRIAXone (ROCEPHIN) 1 g in 0.9% sodium chloride (MBP/ADV) 50 mL MBP  1 g IntraVENous Q24H    hydrALAZINE (APRESOLINE) 20 mg/mL injection 10 mg  10 mg IntraVENous Q6H PRN    naloxegol (MOVANTIK) tablet 12.5 mg  12.5 mg Oral ACB       Assessment/Plan     Active Problems:    Hypokalemia (8/17/2014)      Leukopenia (5/16/2017)      UTI (urinary tract infection) (5/16/2017)      Abdominal pain (5/16/2017)      Abnormal CT of the abdomen (5/17/2017)      Altered mental status, unspecified (5/18/2017)    Plan      Abdominal Pain/ Gastroparesis possible colitis  1. D/C Reglan, continue Movantik   2. D/C morphine 2mg PRN, reassess tomorrow  3. Continue Zofran 4mg, Miralax, Bentyl  4. Continue conservative management per GI consult recommendation  5. Cardiac enzymes negative, EKG shows normal sinus rhythm, continue to monitor   6. might use erythromycin for gastroparesis    Possible cystitis  1. Continue Rocephin, urine culture is negative, continue to monitor, will d/c rocephin if culture remains negative  2. Monitor for possible colitis       Dementia / deconditioning   1.  F/U palliative care, family meeting tomorrow discussing continued care following discharge  2. F/U  possible placement at [facility]  3. F/U PT and OT consult       Elevated blood pressure  1. BP is controlled this morning;  Administer hydralazine oral or IV prn if SBP >170  2. Continue to monitor, consider Norvasc 2.5mg if not well controlled        Agitation/Delirum   1. Ativan PO PRN, Risperdone 0.25 mg qhs   2. Continue to monitor      Hypokalemia   1. Potassium levels back to normal  2. Oral potassium prn, Will continue monitor      Hypothyroid  1. Synthroid 25 mg daily   2.  F/U TSH and T4    Continue to monitor labs: CBC, CMP, TSH, T4    DVT/GI Prophylaxis: SCD and Pepcid    Dayami REBOLLEDO  5/21/2017 12:15 PM

## 2017-05-22 VITALS
DIASTOLIC BLOOD PRESSURE: 70 MMHG | RESPIRATION RATE: 20 BRPM | OXYGEN SATURATION: 96 % | HEART RATE: 94 BPM | TEMPERATURE: 97.9 F | WEIGHT: 125.1 LBS | HEIGHT: 61 IN | BODY MASS INDEX: 23.62 KG/M2 | SYSTOLIC BLOOD PRESSURE: 109 MMHG

## 2017-05-22 PROBLEM — F03.90 DEMENTIA (HCC): Status: ACTIVE | Noted: 2017-05-22

## 2017-05-22 PROBLEM — G93.40 ACUTE ENCEPHALOPATHY: Status: ACTIVE | Noted: 2017-05-22

## 2017-05-22 LAB
ANION GAP BLD CALC-SCNC: 8 MMOL/L (ref 3–18)
BACTERIA SPEC CULT: NORMAL
BACTERIA SPEC CULT: NORMAL
BASOPHILS # BLD AUTO: 0 K/UL (ref 0–0.06)
BASOPHILS # BLD: 0 % (ref 0–2)
BUN SERPL-MCNC: 25 MG/DL (ref 7–18)
BUN/CREAT SERPL: 19 (ref 12–20)
CALCIUM SERPL-MCNC: 8.7 MG/DL (ref 8.5–10.1)
CHLORIDE SERPL-SCNC: 103 MMOL/L (ref 100–108)
CO2 SERPL-SCNC: 24 MMOL/L (ref 21–32)
CREAT SERPL-MCNC: 1.33 MG/DL (ref 0.6–1.3)
DIFFERENTIAL METHOD BLD: ABNORMAL
EOSINOPHIL # BLD: 0.2 K/UL (ref 0–0.4)
EOSINOPHIL NFR BLD: 4 % (ref 0–5)
ERYTHROCYTE [DISTWIDTH] IN BLOOD BY AUTOMATED COUNT: 16.1 % (ref 11.6–14.5)
GLUCOSE SERPL-MCNC: 103 MG/DL (ref 74–99)
HCT VFR BLD AUTO: 35.7 % (ref 35–45)
HGB BLD-MCNC: 12 G/DL (ref 12–16)
LYMPHOCYTES # BLD AUTO: 32 % (ref 21–52)
LYMPHOCYTES # BLD: 1.6 K/UL (ref 0.9–3.6)
MCH RBC QN AUTO: 28.9 PG (ref 24–34)
MCHC RBC AUTO-ENTMCNC: 33.6 G/DL (ref 31–37)
MCV RBC AUTO: 86 FL (ref 74–97)
MONOCYTES # BLD: 0.4 K/UL (ref 0.05–1.2)
MONOCYTES NFR BLD AUTO: 8 % (ref 3–10)
NEUTS SEG # BLD: 2.7 K/UL (ref 1.8–8)
NEUTS SEG NFR BLD AUTO: 56 % (ref 40–73)
PLATELET # BLD AUTO: 190 K/UL (ref 135–420)
PMV BLD AUTO: 10.3 FL (ref 9.2–11.8)
POTASSIUM SERPL-SCNC: 4.8 MMOL/L (ref 3.5–5.5)
RBC # BLD AUTO: 4.15 M/UL (ref 4.2–5.3)
SERVICE CMNT-IMP: NORMAL
SERVICE CMNT-IMP: NORMAL
SODIUM SERPL-SCNC: 135 MMOL/L (ref 136–145)
WBC # BLD AUTO: 4.8 K/UL (ref 4.6–13.2)

## 2017-05-22 PROCEDURE — 36415 COLL VENOUS BLD VENIPUNCTURE: CPT | Performed by: FAMILY MEDICINE

## 2017-05-22 PROCEDURE — 80048 BASIC METABOLIC PNL TOTAL CA: CPT | Performed by: FAMILY MEDICINE

## 2017-05-22 PROCEDURE — 74011250637 HC RX REV CODE- 250/637: Performed by: FAMILY MEDICINE

## 2017-05-22 PROCEDURE — 85025 COMPLETE CBC W/AUTO DIFF WBC: CPT | Performed by: FAMILY MEDICINE

## 2017-05-22 RX ORDER — LEVOTHYROXINE SODIUM 25 UG/1
25 TABLET ORAL
Qty: 30 TAB | Refills: 0 | Status: SHIPPED | OUTPATIENT
Start: 2017-05-22

## 2017-05-22 RX ORDER — HYDROCODONE BITARTRATE AND ACETAMINOPHEN 5; 325 MG/1; MG/1
1 TABLET ORAL
Status: DISCONTINUED | OUTPATIENT
Start: 2017-05-22 | End: 2017-05-22 | Stop reason: HOSPADM

## 2017-05-22 RX ORDER — FAMOTIDINE 20 MG/1
20 TABLET, FILM COATED ORAL DAILY
Status: DISCONTINUED | OUTPATIENT
Start: 2017-05-22 | End: 2017-05-22 | Stop reason: HOSPADM

## 2017-05-22 RX ORDER — ONDANSETRON 4 MG/1
4 TABLET, ORALLY DISINTEGRATING ORAL
Qty: 90 TAB | Refills: 0 | Status: SHIPPED | OUTPATIENT
Start: 2017-05-22

## 2017-05-22 RX ORDER — DOCUSATE SODIUM 100 MG/1
100 CAPSULE, LIQUID FILLED ORAL 2 TIMES DAILY
Qty: 60 CAP | Refills: 0 | Status: SHIPPED | OUTPATIENT
Start: 2017-05-22 | End: 2017-08-20

## 2017-05-22 RX ORDER — POLYETHYLENE GLYCOL 3350 17 G/17G
17 POWDER, FOR SOLUTION ORAL DAILY
Qty: 30 PACKET | Refills: 0 | Status: SHIPPED | OUTPATIENT
Start: 2017-05-22

## 2017-05-22 RX ORDER — FACIAL-BODY WIPES
10 EACH TOPICAL
Qty: 30 SUPPOSITORY | Refills: 0 | Status: SHIPPED | OUTPATIENT
Start: 2017-05-22

## 2017-05-22 RX ORDER — ERYTHROMYCIN 250 MG/1
250 TABLET, COATED ORAL 4 TIMES DAILY
Qty: 90 TAB | Refills: 0 | Status: SHIPPED | OUTPATIENT
Start: 2017-05-22

## 2017-05-22 RX ORDER — DOCUSATE SODIUM 100 MG/1
100 CAPSULE, LIQUID FILLED ORAL 2 TIMES DAILY
Status: DISCONTINUED | OUTPATIENT
Start: 2017-05-22 | End: 2017-05-22 | Stop reason: HOSPADM

## 2017-05-22 RX ORDER — ADHESIVE BANDAGE
30 BANDAGE TOPICAL
Qty: 1 BOTTLE | Refills: 0 | Status: SHIPPED | OUTPATIENT
Start: 2017-05-22

## 2017-05-22 RX ORDER — RISPERIDONE 0.25 MG/1
0.25 TABLET, FILM COATED ORAL
Qty: 30 TAB | Refills: 0 | Status: SHIPPED | OUTPATIENT
Start: 2017-05-22

## 2017-05-22 RX ORDER — ERYTHROMYCIN 250 MG/1
500 TABLET, COATED ORAL 4 TIMES DAILY
Status: DISCONTINUED | OUTPATIENT
Start: 2017-05-22 | End: 2017-05-22

## 2017-05-22 RX ORDER — LORAZEPAM 0.5 MG/1
0.5 TABLET ORAL
Qty: 90 TAB | Refills: 0 | Status: SHIPPED | OUTPATIENT
Start: 2017-05-22

## 2017-05-22 RX ORDER — FAMOTIDINE 20 MG/1
20 TABLET, FILM COATED ORAL DAILY
Qty: 30 TAB | Refills: 0 | Status: SHIPPED | OUTPATIENT
Start: 2017-05-22

## 2017-05-22 RX ORDER — DICYCLOMINE HYDROCHLORIDE 10 MG/5ML
10 SOLUTION ORAL 4 TIMES DAILY
Qty: 473 ML | Refills: 0 | Status: SHIPPED | OUTPATIENT
Start: 2017-05-22 | End: 2017-09-12

## 2017-05-22 RX ORDER — HYDROCODONE BITARTRATE AND ACETAMINOPHEN 5; 325 MG/1; MG/1
1 TABLET ORAL
Qty: 40 TAB | Refills: 0 | Status: SHIPPED | OUTPATIENT
Start: 2017-05-22

## 2017-05-22 RX ORDER — ERYTHROMYCIN 250 MG/1
250 TABLET, COATED ORAL 4 TIMES DAILY
Status: DISCONTINUED | OUTPATIENT
Start: 2017-05-22 | End: 2017-05-22 | Stop reason: HOSPADM

## 2017-05-22 RX ADMIN — ERYTHROMYCIN 250 MG: 250 TABLET, FILM COATED ORAL at 10:23

## 2017-05-22 RX ADMIN — NALOXEGOL OXALATE 12.5 MG: 12.5 TABLET, FILM COATED ORAL at 07:30

## 2017-05-22 RX ADMIN — DOCUSATE SODIUM 100 MG: 100 CAPSULE, LIQUID FILLED ORAL at 10:23

## 2017-05-22 RX ADMIN — DICYCLOMINE HYDROCHLORIDE 10 MG: 10 SOLUTION ORAL at 14:36

## 2017-05-22 RX ADMIN — POLYETHYLENE GLYCOL 3350 17 G: 17 POWDER, FOR SOLUTION ORAL at 10:23

## 2017-05-22 RX ADMIN — LEVOTHYROXINE SODIUM 25 MCG: 25 TABLET ORAL at 10:23

## 2017-05-22 RX ADMIN — ERYTHROMYCIN 250 MG: 250 TABLET, FILM COATED ORAL at 14:36

## 2017-05-22 RX ADMIN — DICYCLOMINE HYDROCHLORIDE 10 MG: 10 SOLUTION ORAL at 10:33

## 2017-05-22 RX ADMIN — HYDROCODONE BITARTRATE AND ACETAMINOPHEN 1 TABLET: 5; 325 TABLET ORAL at 14:36

## 2017-05-22 RX ADMIN — LORAZEPAM 0.5 MG: 0.5 TABLET ORAL at 00:23

## 2017-05-22 RX ADMIN — FAMOTIDINE 20 MG: 20 TABLET ORAL at 10:23

## 2017-05-22 NOTE — ROUTINE PROCESS
Bedside and Verbal shift change report given to Kathy Thakur RN (oncoming nurse) by Shruthi Pal RN (offgoing nurse). Report included the following information SBAR, Kardex, MAR and Recent Results. SITUATION:  Code Status: DNR  Reason for Admission: Leukopenia  Abdominal pain  Hypokalemia  UTI (urinary tract infection)  Hospital day: 6  Problem List:       Hospital Problems  Date Reviewed: 5/22/2017          Codes Class Noted POA    Acute encephalopathy ICD-10-CM: G93.40  ICD-9-CM: 348.30  5/22/2017 Yes        Dementia ICD-10-CM: F03.90  ICD-9-CM: 294.20  5/22/2017 Yes        Altered mental status, unspecified ICD-10-CM: R41.82  ICD-9-CM: 780.97  5/18/2017 Unknown        Abnormal CT of the abdomen ICD-10-CM: R93.5  ICD-9-CM: 793.6  5/17/2017 Yes        Leukopenia ICD-10-CM: D72.819  ICD-9-CM: 288.50  5/16/2017 Yes        UTI (urinary tract infection) ICD-10-CM: N39.0  ICD-9-CM: 599.0  5/16/2017 Yes        Abdominal pain ICD-10-CM: R10.9  ICD-9-CM: 789.00  5/16/2017 Yes        Hypokalemia ICD-10-CM: E87.6  ICD-9-CM: 276.8  8/17/2014 Yes              BACKGROUND:   Past Medical History:   Past Medical History:   Diagnosis Date    Acute diastolic heart failure (HCC)     Arthritis     Chest pain, unspecified     Noncardiac likely    Hyperlipidemia     Mitral valve disorders     Nocturia     Pneumonia     Transfusion history     No history of receiving blood or blood product transfusion(s).    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     Unspecified urinary incontinence     Urinary tract infection, site not specified       Patient taking anticoagulants no    Patient has a defibrillator: no    History of shots NO for example, flu, pneumonia, tetanus   Isolation History YES for example, MRSA, CDiff    ASSESSMENT:  Changes in Assessment Throughout Shift: NONE  Significant Changes in 24 hours (for example, RR/code, fall)  Patient has Central Line: no   Patient has Luong Cath: no   Mobility Issues  PT  IV Patency  OR Checklist  Pending Tests    Last Vitals:  Vitals w/ MEWS Score (last day)     Date/Time MEWS Score Pulse Resp Temp BP Level of Consciousness SpO2    05/22/17 0821 1 98 18 97.8 °F (36.6 °C) 123/77 Alert 95 %    05/22/17 0533 1 78 19 97.4 °F (36.3 °C) 109/65 Alert 98 %    05/21/17 2339 1 82 20 97.3 °F (36.3 °C) 132/81 Alert 96 %    05/21/17 2022 1 66 19 97.7 °F (36.5 °C) 128/71 Alert 97 %    05/21/17 1600 1 77 20 97 °F (36.1 °C) 108/68 Alert 96 %    05/21/17 1251 1 80 18 97.5 °F (36.4 °C) 111/67 Alert 99 %    05/21/17 0831 2 92 17 97 °F (36.1 °C) 139/68 Responds to Voice 95 %    05/21/17 0442 -- 84 18 96.4 °F (35.8 °C) 100/61 -- 98 %            PAIN    Pain Assessment    Pain Intensity 1: 0 (05/22/17 0400)    Pain Location 1: Leg    Pain Intervention(s) 1: Medication (see MAR)    Patient Stated Pain Goal: 0  Intervention effective: yes  Time of last intervention:  Reassessment Completed: yes   Other actions taken for pain: Distraction    Last 3 Weights:  Last 3 Recorded Weights in this Encounter    05/16/17 2223   Weight: 56.7 kg (125 lb 1.6 oz)   Weight change:     INTAKE/OUPUT    Current Shift:      Last three shifts: 05/20 1901 - 05/22 0700  In: 500 [P.O.:500]  Out: 0     RECOMMENDATIONS AND DISCHARGE PLANNING  Patient needs and requests: Comfort/ nourishment    Pending tests/procedures: labs     Discharge plan for patient: Home    Discharge planning Needs or Barriers: none    Estimated Discharge Date: 5/22/2017 Posted on Whiteboard in Patients Room: yes       \"HEALS\" SAFETY CHECK  A safety check occurred in the patient's room between off going nurse and oncoming nurse listed above.     The safety check included the below items:    H  High Alert Medications Verify all high alert medication drips (heparin, PCA, etc.)  E  Equipment Suction is set up for ALL patients (with fox)  Red plugs utilized for all equipment (IV pumps, etc.)  WOWs wiped down at end of shift.  Room stocked with oxygen, suction, and other unit-specific supplies  A  Alarms Bed alarm is set for fall risk patients  Ensure chair alarm is in place and activated if patient is up in a chair  L  Lines Check IV for any infiltration  Luong bag is empty if patient has a Luong   Tubing and IV bags are labeled  S  Safety  Room is clean, patient is clean, and equipment is clean. Hallways are clear from equipment besides carts. Fall bracelet on for fall risk patients  Ensure room is clear and free of clutter  Suction is set up for ALL patients (with fox)  Hallways are clear from equipment besides carts.    Isolation precautions followed, supplies available outside room, sign posted    Gabriel Isaacs RN

## 2017-05-22 NOTE — DISCHARGE SUMMARY
Discharge Summary     Patient: Rita Edgar MRN: [id number]  SSN: [id number]    YOB: 1917  Age: 80 y.o.   Sex: female       Admit Date: 5/16/2017    Discharge Date: 5/22/2017      Admission Diagnoses: Leukopenia  Abdominal pain  Hypokalemia  UTI (urinary tract infection)    Discharge Diagnoses:   Problem List as of 5/22/2017  Date Reviewed: 5/22/2017          Codes Class Noted - Resolved    Acute encephalopathy ICD-10-CM: G93.40  ICD-9-CM: 348.30  5/22/2017 - Present        Dementia ICD-10-CM: F03.90  ICD-9-CM: 294.20  5/22/2017 - Present        Altered mental status, unspecified ICD-10-CM: R41.82  ICD-9-CM: 780.97  5/18/2017 - Present        Abnormal CT of the abdomen ICD-10-CM: R93.5  ICD-9-CM: 793.6  5/17/2017 - Present        Leukopenia ICD-10-CM: D72.819  ICD-9-CM: 288.50  5/16/2017 - Present        UTI (urinary tract infection) ICD-10-CM: N39.0  ICD-9-CM: 599.0  5/16/2017 - Present        Abdominal pain ICD-10-CM: R10.9  ICD-9-CM: 789.00  5/16/2017 - Present        HLD (hyperlipidemia) (Chronic) ICD-10-CM: E78.5  ICD-9-CM: 272.4  8/17/2014 - Present    Overview Signed 11/11/2015  2:34 PM by Clem Ngo MD     On simva             St[facility] ulcer [facility] left lower [facility] ICD-10-CM: N77.465  ICD-9-CM: 454.0  8/17/2014 - Present        Venous (peripheral) insufficiency (Chronic) ICD-10-CM: Q61.8  ICD-9-CM: 459.81  8/17/2014 - Present        Frailty (Chronic) ICD-10-CM: R54  ICD-9-CM: 205  8/17/2014 - Present        Hypokalemia ICD-10-CM: E87.6  ICD-9-CM: 276.8  8/17/2014 - Present        Anemia (Chronic) ICD-10-CM: D64.9  ICD-9-CM: 285.9  8/17/2014 - Present        Cellulitis ICD-10-CM: L03.90  ICD-9-CM: 682.9  8/16/2014 - Present        Coronary atherosclerosis of native coronary artery ICD-10-CM: I25.10  ICD-9-CM: 414.01  11/6/2013 - Present    Overview Addendum 12/23/2013 11:06 AM by Clem Ngo MD     likely with wma in echo  10/13 mild infbasal ischemia             Old myocardial infarction ICD-10-CM: I25.2  ICD-9-CM: 739  10/18/2013 - Present    Overview Signed 10/18/2013  4:16 PM by Chan Denson MD     2011/12 per pt             Transfusion history ICD-10-CM: Z92.89  ICD-9-CM: V15.89  Unknown - Present    Overview Signed 10/17/2013  1:40 PM by Renetta Newell     No history of receiving blood or blood product transfusion(s). Pneumonia ICD-10-CM: J18.9  ICD-9-CM: 874  Unknown - Present        Acute diastolic heart failure (HCC) ICD-10-CM: I50.31  ICD-9-CM: 428.31  Unknown - Present        Mitral regurgitation ICD-10-CM: I34.0  ICD-9-CM: 424.0  Unknown - Present    Overview Addendum 12/9/2015  1:58 PM by Chan Denson MD     12/15, 10/13 mild MR             Chest pain, unspecified ICD-10-CM: R07.9  ICD-9-CM: 786.50  Unknown - Present    Overview Signed 10/17/2013  1:42 PM by Renetta Newell     Noncardiac likely             Undiagnosed cardiac murmurs ICD-10-CM: R01.1  ICD-9-CM: 780. 2  Unknown - Present        Overactive bladder ICD-10-CM: N32.81  ICD-9-CM: 596.51  9/3/2013 - Present        Incontinence ICD-10-CM: R32  ICD-9-CM: 788.30  8/1/2013 - Present        Recurrent UTI ICD-10-CM: N39.0  ICD-9-CM: 599.0  8/1/2013 - Present               Discharge Condition: Good    Hospital Course:     HPI: Mely Gonzales is a 80 y.o.  female with a history of heart failure, urinary incontinence, and arthritis who presented to the ER 05/16 via EMS for chronic abdominal pain. The patient reports she has had abdominal pain for many years, but the pain was much worse stating it \"felt like a BM\". CT was non specific, indicated moderate debris possibly due to gastroparesis. The patient denies chest pain, nausea, vomiting, and hematuria. She reports dizziness, weakness, and shortness of breath with activity. Pt has past surgical history of multiple abdominal surgeries. GI consult recommends conservative management; abdominal pain possibly due to gastroparesis.  On 05/17, she reports feeling much better and reports her abdominal pain is less intense and localized to the RUQ. She is slightly confused, A&Ox2. Patient continues to report diffuse abdominal pain and now denies chest pain or shortness of breath. Nursing staff reports patient's last BM was 5/21. Blood and urine cultures are both negative. The family is discussing with  to long term facility.   [facility] Course:   EKG 5/18:  Normal sinus rhythm. Normal EKG. CXR 5/16  Comparison: August 31, 2014      Findings: Fullness of the bilateral beba, right greater than left is similar to prior comparison. There is mild bibasilar atelectasis, left greater than right. There is no pleural effusion, pulmonary edema, or pneumothorax. The heart is mildly enlarged in size. There is increased density overlying the right arm.      IMPRESSION  Impression:  1. Mild bibasilar atelectasis, left greater than right. No definite infiltrate.      2. Fullness of the beba bilaterally is grossly stable from prior.      3. Dense material overlying the region of the right arm. Correlate clinically for possibility of extravasated intravenous contrast from recent abdominal  pelvic CT.      CT 5/16  Moderate amount of ingested debris throughout the stomach. This is nonspecific. Correlate clinically for the possibility of gastroparesis.     Possible focus of circumferential wall thickening of the transverse colon. However, this could be artifactual and related to peristalsis but did appear similar on prior comparison. If clinically indicated, colonoscopy could further assess this finding.      Stable partially calcified liver cysts. No new suspicious liver lesion.      Status post cholecystectomy.      Stable renal cysts including a possibly mildly septated right renal cyst.      Dense atherosclerosis at the origin of the renal arteries bilateral.      Fibroid uterus.      Mild diverticulosis.       Mild ventral fat-containing hernia with minimal inflammation.      Additional chronic incidentals as above. Pt had palliative care consult was made DNR further discussion with family sister and niece regarding hospice/comfort care . Discussed with niece she is having  A meeting with case manger today . Pt can not function at home alone due to under lying Dementia . Pt finished 6 days with rocephin IV urine culture negative     ABT was discontinue before discharge will start erythromycin for gastroparesis and monitor sx she also started on Pepcid and bentyl with some response. Add Norco prn for pain with colace , miralax and MOM prn she needs close monitor for constipation    Pt has poor appetite she needs close monitor for oral intake might need appetite stimulant will follow up lab in 2 days at the nursing     Pt TSH with up started on synthroid 0.25 mg daily will f/u tsh and free t4 . Will discuss with case manger outcome of the meeting today and if she will be able to go to SNF today    Consults: Gastroenterology, Palliative Care    Significant Diagnostic Studies: Microbiology: blood culture: negative; urine culture: negative    Physical Examination:   General:  Alert, no acute distress, slightly confused    HEENT: NC, Atraumatic. PERRLA, anicteric sclerae. Right eye blindness, tongue is dry. Lungs: CTA Bilaterally. No Wheezing/Rhonchi/Rales. Heart:  Regular rhythm,  No murmur, No Rubs, No Gallops  Abdomen: Tenderness to epigastrium.  +Bowel sounds, no HSM  Extremities: No cyanosis, no lower extremity edema. Psych:   Not anxious or agitated. Neurologic:  CN 2-12 grossly intact, Alert and oriented X 2. No acute neurological deficits. Disposition: SNF    Discharge Medications:   Current Discharge Medication List      START taking these medications    Details   bisacodyl (DULCOLAX) 10 mg suppository Insert 10 mg into rectum daily as needed.   Qty: 30 Suppository, Refills: 0      dicyclomine (BENTYL) 10 mg/5 mL soln oral solution Take 5 mL by mouth four (4) times daily. Qty: 473 mL, Refills: 0      docusate sodium (COLACE) 100 mg capsule Take 1 Cap by mouth two (2) times a day for 90 days. Hold for diarrhea  Qty: 60 Cap, Refills: 0      erythromycin base (E-MYCIN) 250 mg tablet Take 1 Tab by mouth four (4) times daily. Qty: 90 Tab, Refills: 0      famotidine (PEPCID) 20 mg tablet Take 1 Tab by mouth daily. Qty: 30 Tab, Refills: 0      HYDROcodone-acetaminophen (NORCO) 5-325 mg per tablet Take 1 Tab by mouth every four (4) hours as needed. Max Daily Amount: 6 Tabs. Qty: 40 Tab, Refills: 0      levothyroxine (SYNTHROID) 25 mcg tablet Take 1 Tab by mouth Daily (before breakfast). Qty: 30 Tab, Refills: 0      LORazepam (ATIVAN) 0.5 mg tablet Take 1 Tab by mouth three (3) times daily as needed. Max Daily Amount: 1.5 mg.  Qty: 90 Tab, Refills: 0      magnesium hydroxide (MILK OF MAGNESIA) 400 mg/5 mL suspension Take 30 mL by mouth daily as needed for Constipation. Qty: 1 Bottle, Refills: 0      ondansetron (ZOFRAN ODT) 4 mg disintegrating tablet Take 1 Tab by mouth every eight (8) hours as needed. Qty: 90 Tab, Refills: 0      polyethylene glycol (MIRALAX) 17 gram packet Take 1 Packet by mouth daily. Qty: 30 Packet, Refills: 0      risperiDONE (RISPERDAL) 0.25 mg tablet Take 1 Tab by mouth nightly. Qty: 30 Tab, Refills: 0         CONTINUE these medications which have NOT CHANGED    Details   nitroglycerin (NITROSTAT) 0.4 mg SL tablet 1 Tab by SubLINGual route every five (5) minutes as needed for Chest Pain for 3 doses.   Qty: 25 Tab, Refills: 0         STOP taking these medications       traMADol (ULTRAM) 50 mg tablet Comments:   Reason for Stopping:         furosemide (LASIX) 40 mg tablet Comments:   Reason for Stopping:         potassium chloride (K-DUR, KLOR-CON) 20 mEq tablet Comments:   Reason for Stopping:         simvastatin (ZOCOR) 20 mg tablet Comments:   Reason for Stopping:               Activity: Activity as tolerated; Monitor patient closely for fall precautions  Diet: Regular Diet with nutritional supplements, Encourage fluids   Wound Care: None needed, Decubitus ulcer precautions    Follow-up Appointments   Procedures    FOLLOW UP VISIT Appointment in: One Week F/u PCP at nursing home cbc, cmp, mag tsh and free t4. Monitor oral intake pt is DNR she will need long term placement and further discussion for comfort care/hospice care. Pt needs close monitor for fall s. .. F/u PCP at nursing home cbc, cmp, mag tsh and free t4. Monitor oral intake pt is DNR she will need long term placement and further discussion for comfort care/hospice care. Pt needs close monitor for fall she needs fall precautions, decubitus precautions     Standing Status:   Standing     Number of Occurrences:   1     Order Specific Question:   Appointment in     Answer:    One Week       Signed By: Lavon Mcbride MD PA-S    May 22, 2017   8:45 AM

## 2017-05-22 NOTE — PROGRESS NOTES
Care Management Interventions  PCP Verified by CM: Yes (DR. Niesha Torres)  Palliative Care Consult (Criteria: CHF and RRAT>21): Yes  Mode of Transport at Discharge: BLS  Transition of Care Consult (CM Consult): Discharge Planning, LTAC  Physical Therapy Consult: Yes  Speech Therapy Consult: Yes  Current Support Network: Own Home, Family Lives Nearby  Confirm Follow Up Transport: Family (IF POSSIBLE FAMILY AND PT REQUEST PT BE PLACED IN LONG TERM CARE)  Plan discussed with Pt/Family/Caregiver: Yes (FAMILY REQUEST [address])  [place] of Choice Offered:  (FAMILY CHOSE [address] [place] [address])  Discharge Location  Discharge Placement: [facility] (LTAC)    LATE ENTRY INTERVIEW OCCURRED 5/17/17, WITH THIS PT'S DAUGHTER WHO IS THE poa  Pt was accepted by Eugenio Izaguirre and pt transport set-up for 4:00 p.m.

## 2017-05-22 NOTE — DISCHARGE SUMMARY
Discharge Summary     Patient: Gaudencio Ingram MRN: [id number]  SSN: [id number]    YOB: 1917  Age: 80 y.o.   Sex: female       Admit Date: 5/16/2017    Discharge Date: 5/22/2017      Admission Diagnoses: Leukopenia  Abdominal pain  Hypokalemia  UTI (urinary tract infection)    Discharge Diagnoses:   Problem List as of 5/22/2017  Date Reviewed: 5/22/2017          Codes Class Noted - Resolved    Acute encephalopathy ICD-10-CM: G93.40  ICD-9-CM: 348.30  5/22/2017 - Present        Dementia ICD-10-CM: F03.90  ICD-9-CM: 294.20  5/22/2017 - Present        Altered mental status, unspecified ICD-10-CM: R41.82  ICD-9-CM: 780.97  5/18/2017 - Present        Abnormal CT of the abdomen ICD-10-CM: R93.5  ICD-9-CM: 793.6  5/17/2017 - Present        Leukopenia ICD-10-CM: D72.819  ICD-9-CM: 288.50  5/16/2017 - Present        UTI (urinary tract infection) ICD-10-CM: N39.0  ICD-9-CM: 599.0  5/16/2017 - Present        Abdominal pain ICD-10-CM: R10.9  ICD-9-CM: 789.00  5/16/2017 - Present        HLD (hyperlipidemia) (Chronic) ICD-10-CM: E78.5  ICD-9-CM: 272.4  8/17/2014 - Present    Overview Signed 11/11/2015  2:34 PM by Usama Betancourt MD     On simva             Stasis ulcer of left lower extremity ([facility] ICD-10-CM: S35.393  ICD-9-CM: 454.0  8/17/2014 - Present        Venous (peripheral) insufficiency (Chronic) ICD-10-CM: L33.8  ICD-9-CM: 459.81  8/17/2014 - Present        Frailty (Chronic) ICD-10-CM: R54  ICD-9-CM: 209  8/17/2014 - Present        Hypokalemia ICD-10-CM: E87.6  ICD-9-CM: 276.8  8/17/2014 - Present        Anemia (Chronic) ICD-10-CM: D64.9  ICD-9-CM: 285.9  8/17/2014 - Present        Cellulitis ICD-10-CM: L03.90  ICD-9-CM: 682.9  8/16/2014 - Present        Coronary atherosclerosis of native coronary artery ICD-10-CM: I25.10  ICD-9-CM: 414.01  11/6/2013 - Present    Overview Addendum 12/23/2013 11:06 AM by Usama Betancourt MD     likely with wma in echo  10/13 mild infbasal ischemia             Old myocardial infarction ICD-10-CM: I25.2  ICD-9-CM: 057  10/18/2013 - Present    Overview Signed 10/18/2013  4:16 PM by Tiffany Feng MD     2011/12 per pt             Transfusion history ICD-10-CM: Z92.89  ICD-9-CM: V15.89  Unknown - Present    Overview Signed 10/17/2013  1:40 PM by Faustino Gutierrez     No history of receiving blood or blood product transfusion(s). Pneumonia ICD-10-CM: J18.9  ICD-9-CM: 110  Unknown - Present        Acute diastolic heart failure (HCC) ICD-10-CM: I50.31  ICD-9-CM: 428.31  Unknown - Present        Mitral regurgitation ICD-10-CM: I34.0  ICD-9-CM: 424.0  Unknown - Present    Overview Addendum 12/9/2015  1:58 PM by Tiffany Feng MD     12/15, 10/13 mild MR             Chest pain, unspecified ICD-10-CM: R07.9  ICD-9-CM: 786.50  Unknown - Present    Overview Signed 10/17/2013  1:42 PM by Faustino Gutierrez     Noncardiac likely             Undiagnosed cardiac murmurs ICD-10-CM: R01.1  ICD-9-CM: 654. 2  Unknown - Present        Overactive bladder ICD-10-CM: N32.81  ICD-9-CM: 596.51  9/3/2013 - Present        Incontinence ICD-10-CM: R32  ICD-9-CM: 788.30  8/1/2013 - Present        Recurrent UTI ICD-10-CM: N39.0  ICD-9-CM: 599.0  8/1/2013 - Present               Discharge Condition: Good    Hospital Course:     HPI: Marcelino Tate is a 80 y.o.  female with a history of heart failure, urinary incontinence, and arthritis who presented to the ER 05/16 via EMS for chronic abdominal pain. The patient reports she has had abdominal pain for many years, but the pain was much worse stating it \"felt like a BM\". CT was non specific, indicated moderate debris possibly due to gastroparesis. The patient denies chest pain, nausea, vomiting, and hematuria. She reports dizziness, weakness, and shortness of breath with activity. Pt has past surgical history of multiple abdominal surgeries. GI consult recommends conservative management; abdominal pain possibly due to gastroparesis.  On 05/17, she reports feeling much better and reports her abdominal pain is less intense and localized to the RUQ. She is slightly confused, A&Ox2. Patient continues to report diffuse abdominal pain and now denies chest pain or shortness of breath. Nursing staff reports patient's last BM was 5/21. Blood and urine cultures are both negative. The family is discussing with  to long term facility.   [facility] Course:   EKG 5/18:  Normal sinus rhythm. Normal EKG. CXR 5/16  Comparison: August 31, 2014      Findings: Fullness of the bilateral beba, right greater than left is similar to prior comparison. There is mild bibasilar atelectasis, left greater than right. There is no pleural effusion, pulmonary edema, or pneumothorax. The heart is mildly enlarged in size. There is increased density overlying the right arm.      IMPRESSION  Impression:  1. Mild bibasilar atelectasis, left greater than right. No definite infiltrate.      2. Fullness of the beba bilaterally is grossly stable from prior.      3. Dense material overlying the region of the right arm. Correlate clinically for possibility of extravasated intravenous contrast from recent abdominal  pelvic CT.      CT 5/16  Moderate amount of ingested debris throughout the stomach. This is nonspecific. Correlate clinically for the possibility of gastroparesis.     Possible focus of circumferential wall thickening of the transverse colon. However, this could be artifactual and related to peristalsis but did appear similar on prior comparison. If clinically indicated, colonoscopy could further assess this finding.      Stable partially calcified liver cysts. No new suspicious liver lesion.      Status post cholecystectomy.      Stable renal cysts including a possibly mildly septated right renal cyst.      Dense atherosclerosis at the origin of the renal arteries bilateral.      Fibroid uterus.      Mild diverticulosis.       Mild ventral fat-containing hernia with minimal inflammation.      Additional chronic incidentals as above. Consults: Gastroenterology, Palliative Care    Significant Diagnostic Studies: Microbiology: blood culture: negative; urine culture: negative    Physical Examination:   General:  Alert, no acute distress, slightly confused    HEENT: NC, Atraumatic. PERRLA, anicteric sclerae. Right eye blindness, tongue is dry. Lungs: CTA Bilaterally. No Wheezing/Rhonchi/Rales. Heart:  Regular rhythm,  No murmur, No Rubs, No Gallops  Abdomen: Tenderness to epigastrium.  +Bowel sounds, no HSM  Extremities: No cyanosis, no lower extremity edema. Psych:   Not anxious or agitated. Neurologic:  CN 2-12 grossly intact, Alert and oriented X 2. No acute neurological deficits. Disposition: SNF    Discharge Medications:   Current Discharge Medication List      START taking these medications    Details   bisacodyl (DULCOLAX) 10 mg suppository Insert 10 mg into rectum daily as needed. Qty: 30 Suppository, Refills: 0      dicyclomine (BENTYL) 10 mg/5 mL soln oral solution Take 5 mL by mouth four (4) times daily. Qty: 473 mL, Refills: 0      docusate sodium (COLACE) 100 mg capsule Take 1 Cap by mouth two (2) times a day for 90 days. Hold for diarrhea  Qty: 60 Cap, Refills: 0      erythromycin base (E-MYCIN) 250 mg tablet Take 1 Tab by mouth four (4) times daily. Qty: 90 Tab, Refills: 0      famotidine (PEPCID) 20 mg tablet Take 1 Tab by mouth daily. Qty: 30 Tab, Refills: 0      HYDROcodone-acetaminophen (NORCO) 5-325 mg per tablet Take 1 Tab by mouth every four (4) hours as needed. Max Daily Amount: 6 Tabs. Qty: 40 Tab, Refills: 0      levothyroxine (SYNTHROID) 25 mcg tablet Take 1 Tab by mouth Daily (before breakfast). Qty: 30 Tab, Refills: 0      LORazepam (ATIVAN) 0.5 mg tablet Take 1 Tab by mouth three (3) times daily as needed.  Max Daily Amount: 1.5 mg.  Qty: 90 Tab, Refills: 0      magnesium hydroxide (MILK OF MAGNESIA) 400 mg/5 mL suspension Take 30 mL by mouth daily as needed for Constipation. Qty: 1 Bottle, Refills: 0      ondansetron (ZOFRAN ODT) 4 mg disintegrating tablet Take 1 Tab by mouth every eight (8) hours as needed. Qty: 90 Tab, Refills: 0      polyethylene glycol (MIRALAX) 17 gram packet Take 1 Packet by mouth daily. Qty: 30 Packet, Refills: 0      risperiDONE (RISPERDAL) 0.25 mg tablet Take 1 Tab by mouth nightly. Qty: 30 Tab, Refills: 0         CONTINUE these medications which have NOT CHANGED    Details   nitroglycerin (NITROSTAT) 0.4 mg SL tablet 1 Tab by SubLINGual route every five (5) minutes as needed for Chest Pain for 3 doses. Qty: 25 Tab, Refills: 0         STOP taking these medications       traMADol (ULTRAM) 50 mg tablet Comments:   Reason for Stopping:         furosemide (LASIX) 40 mg tablet Comments:   Reason for Stopping:         potassium chloride (K-DUR, KLOR-CON) 20 mEq tablet Comments:   Reason for Stopping:         simvastatin (ZOCOR) 20 mg tablet Comments:   Reason for Stopping:               Activity: Activity as tolerated; Monitor patient closely for fall precautions  Diet: Regular Diet with nutritional supplements, Encourage fluids   Wound Care: None needed, Decubitus ulcer precautions    Follow-up Appointments   Procedures    FOLLOW UP VISIT Appointment in: One Week F/u PCP at nursing home cbc, cmp, mag tsh and free t4. Monitor oral intake pt is DNR she will need long term placement and further discussion for comfort care/hospice care. Pt needs close monitor for fall s. .. F/u PCP at nursing home cbc, cmp, mag tsh and free t4. Monitor oral intake pt is DNR she will need long term placement and further discussion for comfort care/hospice care. Pt needs close monitor for fall she needs fall precautions, decubitus precautions     Standing Status:   Standing     Number of Occurrences:   1     Order Specific Question:   Appointment in     Answer:    One Week       Signed By: Shashi REBOLLEDO May 22, 2017   8:45 AM

## 2017-05-22 NOTE — PROGRESS NOTES
DISCHARGE SUMMARY from Nurse    The following personal items are in your possession at time of discharge:    Dental Appliances: None  Visual Aid: None     Home Medications: None  Jewelry: None  Clothing: None  Other Valuables: None             PATIENT INSTRUCTIONS:    After general anesthesia or intravenous sedation, for 24 hours or while taking prescription Narcotics:  · Limit your activities  · Do not drive and operate hazardous machinery  · Do not make important personal or business decisions  · Do  not drink alcoholic beverages  · If you have not urinated within 8 hours after discharge, please contact your surgeon on call. Report the following to your surgeon:  · Excessive pain, swelling, redness or odor of or around the surgical area  · Temperature over 100.5  · Nausea and vomiting lasting longer than 4 hours or if unable to take medications  · Any signs of decreased circulation or nerve impairment to extremity: change in color, persistent  numbness, tingling, coldness or increase pain  · Any questions        What to do at Home:  Recommended activity: Activity as tolerated, bedrest    If you experience any of the following symptoms nausea, vomiting fever, signs of infection or concerns, please follow up with MD.      *  Please give a list of your current medications to your Primary Care Provider. *  Please update this list whenever your medications are discontinued, doses are      changed, or new medications (including over-the-counter products) are added. *  Please carry medication information at all times in case of emergency situations. These are general instructions for a healthy lifestyle:    No smoking/ No tobacco products/ Avoid exposure to second hand smoke    Surgeon General's Warning:  Quitting smoking now greatly reduces serious risk to your health.     Obesity, smoking, and sedentary lifestyle greatly increases your risk for illness    A healthy diet, regular physical exercise & weight monitoring are important for maintaining a healthy lifestyle    You may be retaining fluid if you have a history of heart failure or if you experience any of the following symptoms:  Weight gain of 3 pounds or more overnight or 5 pounds in a week, increased swelling in our hands or feet or shortness of breath while lying flat in bed. Please call your doctor as soon as you notice any of these symptoms; do not wait until your next office visit. Recognize signs and symptoms of STROKE:    F-face looks uneven    A-arms unable to move or move unevenly    S-speech slurred or non-existent    T-time-call 911 as soon as signs and symptoms begin-DO NOT go       Back to bed or wait to see if you get better-TIME IS BRAIN. Warning Signs of HEART ATTACK     Call 911 if you have these symptoms:   Chest discomfort. Most heart attacks involve discomfort in the center of the chest that lasts more than a few minutes, or that goes away and comes back. It can feel like uncomfortable pressure, squeezing, fullness, or pain.  Discomfort in other areas of the upper body. Symptoms can include pain or discomfort in one or both arms, the back, neck, jaw, or stomach.  Shortness of breath with or without chest discomfort.  Other signs may include breaking out in a cold sweat, nausea, or lightheadedness. Don't wait more than five minutes to call [address]! Fast action can save your life. Calling 911 is almost always the fastest way to get lifesaving treatment. Emergency Medical Services staff can begin treatment when they arrive  up to an hour sooner than if someone gets to the hospital by car. The discharge information has been reviewed with the patient. The caregiver verbalized understanding. Discharge medications reviewed with the guardian and caregiver and appropriate educational materials and side effects teaching were provided.

## 2017-05-22 NOTE — DISCHARGE INSTRUCTIONS
Abdominal Pain: Care Instructions  Your Care Instructions    Abdominal pain has many possible causes. Some aren't serious and get better on their own in a few days. Others need more testing and treatment. If your pain continues or gets worse, you need to be rechecked and may need more tests to find out what is wrong. You may need surgery to correct the problem. Don't ignore new symptoms, such as fever, nausea and vomiting, urination problems, pain that gets worse, and dizziness. These may be signs of a more serious problem. Your doctor may have recommended a follow-up visit in the next 8 to 12 hours. If you are not getting better, you may need more tests or treatment. The doctor has checked you carefully, but problems can develop later. If you notice any problems or new symptoms, get medical treatment right away. Follow-up care is a key part of your treatment and safety. Be sure to make and go to all appointments, and call your doctor if you are having problems. It's also a good idea to know your test results and keep a list of the medicines you take. How can you care for yourself at home? · Rest until you feel better. · To prevent dehydration, drink plenty of fluids, enough so that your urine is light yellow or clear like water. Choose water and other caffeine-free clear liquids until you feel better. If you have kidney, heart, or liver disease and have to limit fluids, talk with your doctor before you increase the amount of fluids you drink. · If your stomach is upset, eat mild foods, such as rice, dry toast or crackers, bananas, and applesauce. Try eating several small meals instead of two or three large ones. · Wait until 48 hours after all symptoms have gone away before you have spicy foods, alcohol, and drinks that contain caffeine. · Do not eat foods that are high in fat. · Avoid anti-inflammatory medicines such as aspirin, ibuprofen (Advil, Motrin), and naproxen (Aleve).  These can cause stomach upset. Talk to your doctor if you take daily aspirin for another health problem. When should you call for help? Call 911 anytime you think you may need emergency care. For example, call if:  · You passed out (lost consciousness). · You pass maroon or very bloody stools. · You vomit blood or what looks like coffee grounds. · You have new, severe belly pain. Call your doctor now or seek immediate medical care if:  · Your pain gets worse, especially if it becomes focused in one area of your belly. · You have a new or higher fever. · Your stools are black and look like tar, or they have streaks of blood. · You have unexpected vaginal bleeding. · You have symptoms of a urinary tract infection. These may include:  ¨ Pain when you urinate. ¨ Urinating more often than usual.  ¨ Blood in your urine. · You are dizzy or lightheaded, or you feel like you may faint. Watch closely for changes in your health, and be sure to contact your doctor if:  · You are not getting better after 1 day (24 hours). Where can you learn more? Go to http://abnerProject 10Kgabino.info/. Enter C812 in the search box to learn more about \"Abdominal Pain: Care Instructions. \"  Current as of: May 27, 2016  Content Version: 11.2  © 1587-5708 [employer]. Care instructions adapted under license by [employer] (which disclaims liability or warranty for this information). If you have questions about a medical condition or this instruction, always ask your healthcare professional. [place] [address] any warranty or liability for your use of this information. Alzheimer's Disease: Care Instructions  Your Care Instructions  Alzheimer's disease is a type of dementia. It causes memory loss and affects judgment, language, and behavior. You may have trouble making decisions or may get lost in places that you used to know well.  Alzheimer's disease is different than mild memory loss that occurs with aging. It is not clear what causes Alzheimer's disease, but it is the most common form of dementia in older adults. Finding out that you have this disease is a shock. You may be afraid and worried about how the condition will change your life. Although there is no cure at this time, medicine in some cases may slow memory loss for a while. Other medicines may be able to help you sleep or cope with depression and behavior changes. Alzheimer's disease is different for everyone. It may take many years to develop. In some cases, people can function well for a long time. In the early stage of the disease, you can do things at home to make life easier and safer. You also can keep doing your hobbies and other activities. Many people find comfort in planning now for their future needs. Follow-up care is a key part of your treatment and safety. Be sure to make and go to all appointments, and call your doctor if you are having problems. Its also a good idea to know your test results and keep a list of the medicines you take. How can you care for yourself at home? Taking care of yourself  · If your doctor gives you medicines, take them exactly as prescribed. Call your doctor if you think you are having a problem with your medicine. You will get more details on the medicines your doctor prescribes. · Eat a balanced diet. Get plenty of whole grains, fruits, and vegetables every day. If you are not hungry at mealtimes, eat snacks at midmorning and in the afternoon. Try drinks such as Boost, Ensure, or Sustacal if you are having trouble keeping your weight up. · Stay active. Exercise such as walking may slow the decline of your mental abilities. Try to stay active mentally too. Read and work crossword puzzles if you enjoy these activities. · If you have trouble sleeping, do not nap during the day. Get regular exercise (but not within several hours of bedtime).  Drink a glass of warm milk or caffeine-free herbal tea before going to bed. · Ask your doctor about support groups and other resources in your area. They can help people who have Alzheimer's disease and their families. · Be patient. You may find that a task takes you longer than it used to. · If you have not already done so, make a list of advance directives. Advance directives are instructions to your doctor and family members about what kind of care you want if you become unable to speak or express yourself. Talk to a  about making a will, if you do not already have one. Keeping schedules  · Develop a routine. You will feel less frustrated or confused if you have a clear, simple plan of what to do every day. ¨ Make lists of your medicines and when to take them. ¨ Write down appointments and other tasks in a calendar. ¨ Put sticky notes around the house to help you remember events and other things you have to do. ¨ Schedule activities and tasks for times of the day when you are best able to handle them. Staying safe  · Tell someone when you are going out and where you are going. Let the person know when you will be back. Before you go out alone, write down where you are going, how to get there, and how to get back home. Do this even if you have gone there many times before. Take someone along with you when possible. · Make your home safe. Tack down rugs, put no-slip tape in the tub, use handrails, and put safety switches on stoves and appliances. · Have a family member or other caregiver tell you whether you are driving badly. Deciding to stop driving is very hard for many people. Driving helps you feel independent. Your state s license bureau can do a driving test if there is any question. Plan for other means of getting around when you are no longer able to drive. · Use strong lighting, especially at night. Put night-lights in bedrooms, hallways, and bathrooms.   · Lower the hot water temperature setting to 120°F or lower to avoid burns.  When should you call for help? Call 911 anytime you think you may need emergency care. For example, call if:  · You are lost and do not know whom to call. · You are injured and do not know whom to call. Call your doctor now or seek immediate medical care if:  · Your symptoms suddenly get much worse. Watch closely for changes in your health, and be sure to contact your doctor if:  · You want more information about how you can take care of yourself. Where can you learn more? Go to http://abner-gabino.info/. Enter Y179 in the search box to learn more about \"Alzheimer's Disease: Care Instructions. \"  Current as of: July 26, 2016  Content Version: 11.2  © 2856-3053 [employer]. Care instructions adapted under license by [employer] (which disclaims liability or warranty for this information). If you have questions about a medical condition or this instruction, always ask your healthcare professional. [place] [address] any warranty or liability for your use of this information. Altered Mental Status: Care Instructions  Your Care Instructions  Altered mental status is a change in how well your brain is working. As a result, you may be confused, be less alert than usual, or act in odd ways. This may include seeing or hearing things that aren't really there (hallucinations). A mental status change has many possible causes. For example, it may be the result of an infection, an imbalance of chemicals in the body, or a chronic disease such as diabetes or COPD. It can also be caused by things such as a head injury, taking certain medicines, or using alcohol or drugs. The doctor may do tests to look for the cause. These tests may include urine tests, blood tests, and imaging tests such as a CT scan. Sometimes a clear cause isn't found. But tests can help the doctor rule out a serious cause of your symptoms. A change in mental status can be scary. But mental status will often return to normal when the cause is treated. So it is important to get any follow-up testing or treatment the doctor has suggested. The doctor has checked you carefully, but problems can develop later. If you notice any problems or new symptoms, get medical treatment right away. Follow-up care is a key part of your treatment and safety. Be sure to make and go to all appointments, and call your doctor if you are having problems. It's also a good idea to know your test results and keep a list of the medicines you take. How can you care for yourself at home? · Be safe with medicines. Take your medicines exactly as prescribed. Call your doctor if you think you are having a problem with your medicine. · Have another adult stay with you until you are better. This can help keep you safe. Ask that person to watch for signs that your mental status is getting worse. When should you call for help? Call 911 anytime you think you may need emergency care. For example, call if:  · You passed out (lost consciousness). Call your doctor now or seek immediate medical care if:  · Your mental status is getting worse. · You have new symptoms, such as a fever, chills, or shortness of breath. · You do not feel safe. Watch closely for changes in your health, and be sure to contact your doctor if:  · You do not get better as expected. Where can you learn more? Go to DealExplorer.be  Enter J452 in the search box to learn more about \"Altered Mental Status: Care Instructions. \"   © 7037-1768 [employer]. Care instructions adapted under license by [facility] (which disclaims liability or warranty for this information).  This care instruction is for use with your licensed healthcare professional. If you have questions about a medical condition or this instruction, always ask your healthcare professional. [place] [address] any warranty or liability for your use of this information.   Content Version: 60.5.453023; Current as of: November 20, 2015

## 2017-05-23 ENCOUNTER — TELEPHONE (OUTPATIENT)
Dept: PALLATIVE CARE | Age: 82
End: 2017-05-23

## 2017-08-22 ENCOUNTER — HOSPITAL ENCOUNTER (EMERGENCY)
Age: 82
Discharge: NURSING, REHAB OR LONG-TERM CARE | End: 2017-08-23
Attending: EMERGENCY MEDICINE | Admitting: INTERNAL MEDICINE
Payer: MEDICARE

## 2017-08-22 ENCOUNTER — APPOINTMENT (OUTPATIENT)
Dept: CT IMAGING | Age: 82
End: 2017-08-22
Attending: EMERGENCY MEDICINE
Payer: MEDICARE

## 2017-08-22 ENCOUNTER — APPOINTMENT (OUTPATIENT)
Dept: GENERAL RADIOLOGY | Age: 82
End: 2017-08-22
Attending: EMERGENCY MEDICINE
Payer: MEDICARE

## 2017-08-22 DIAGNOSIS — F03.91 DEMENTIA WITH BEHAVIORAL DISTURBANCE, UNSPECIFIED DEMENTIA TYPE: ICD-10-CM

## 2017-08-22 DIAGNOSIS — R89.9 ABNORMAL LABORATORY TEST RESULT: Primary | ICD-10-CM

## 2017-08-22 LAB — LACTATE BLD-SCNC: 1.7 MMOL/L (ref 0.4–2)

## 2017-08-22 PROCEDURE — 70450 CT HEAD/BRAIN W/O DYE: CPT

## 2017-08-22 PROCEDURE — 83605 ASSAY OF LACTIC ACID: CPT

## 2017-08-22 PROCEDURE — 99283 EMERGENCY DEPT VISIT LOW MDM: CPT

## 2017-08-22 PROCEDURE — 71010 XR CHEST SNGL V: CPT

## 2017-08-22 NOTE — Clinical Note
Status[de-identified] Inpatient [101] Type of Bed: Telemetry [19] Inpatient Hospitalization Certified Necessary for the Following Reasons: 2. Patient admitted for Inpatient Only Procedure (Surgical) Admitting Diagnosis[facility] [[id number]] Admitting Physician: Sunita George [[id number]] Attending Physician: Masood Gould Estimated Length of Stay: 2 Midnights Discharge Plan[de-identified] [facility]

## 2017-08-23 VITALS
RESPIRATION RATE: 16 BRPM | OXYGEN SATURATION: 100 % | DIASTOLIC BLOOD PRESSURE: 70 MMHG | WEIGHT: 220.46 LBS | SYSTOLIC BLOOD PRESSURE: 112 MMHG | BODY MASS INDEX: 41.66 KG/M2 | TEMPERATURE: 97.9 F | HEART RATE: 82 BPM

## 2017-08-23 PROBLEM — S72.011A SUBCAPITAL FRACTURE OF RIGHT FEMUR (HCC): Status: ACTIVE | Noted: 2017-08-23

## 2017-08-23 RX ORDER — SODIUM CHLORIDE 9 MG/ML
250 INJECTION, SOLUTION INTRAVENOUS AS NEEDED
Status: DISCONTINUED | OUTPATIENT
Start: 2017-08-23 | End: 2017-08-23

## 2017-08-23 NOTE — ED NOTES
I have reviewed discharge instructions with the patient's family. The patient's family verbalized understanding. Patient armband removed and shredded. Pt is being transported [facility] to [facility] with no acute distress noted at this time, the patient is alert, oriented and stable at time of discharge. Vital Signs stable. Patient is being discharged with out prescription at this time.

## 2017-08-23 NOTE — ED TRIAGE NOTES
Patient arrived from [address] [place] [address] for abnormal CO2, albumin and creatinine and BUN. Per facility staff patients baseline is confused.

## 2017-08-23 NOTE — ED PROVIDER NOTES
HPI Comments: 81yo female presenting to ED with family who report NH sending pt to ED for \"abnormal lab work\" done on 8/21/17. Per family, pt has hx of dementia and combative behavior and her presentation today is her baseline. Pt is non verbal when asked questions therefore history is limited. Per NH staff and family at bedside, no abnormal behavior noted. Patient is a 80 y.o. female presenting with abnormal lab results and altered mental status. The history is provided by the patient. Abnormal Lab Results     Altered mental status           Past Medical History:   Diagnosis Date    Acute diastolic heart failure (HCC)     Arthritis     Chest pain, unspecified     Noncardiac likely    Hyperlipidemia     Mitral valve disorders     Nocturia     Pneumonia     Transfusion history     No history of receiving blood or blood product transfusion(s). 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     Unspecified urinary incontinence     Urinary tract infection, site not specified        Past Surgical History:   Procedure Laterality Date    ABDOMEN SURGERY PROC UNLISTED      intestines, stricture    HX CHOLECYSTECTOMY      HX HEMORRHOIDECTOMY           Family History:   Problem Relation Age of Onset    Heart Attack Father 72    Sudden Death Neg Hx        Social History     Social History    Marital status:      Spouse name: N/A    Number of children: N/A    Years of education: N/A     Occupational History    Not on file.      Social History Main Topics    Smoking status: Former Smoker     Quit date: 10/18/2002    Smokeless tobacco: Never Used    Alcohol use No    Drug use: No    Sexual activity: Not Currently     Other Topics Concern    Not on file     Social History Narrative         ALLERGIES: Ciprofloxacin    Review of Systems    Vitals:    08/22/17 2058   BP: 112/63   Pulse: 88   Resp: 21   Temp: 97.7 °F (36.5 °C)   SpO2: 99%   Weight: 100 kg (220 lb 7.4 oz)            Physical Exam   Constitutional:   Aggressive, agitated, chronically ill appearing   HENT:   Head: Normocephalic and atraumatic. Eyes: Conjunctivae are normal.   Neck: Neck supple. Cardiovascular: Normal rate. Pulmonary/Chest: Effort normal. No respiratory distress. She has no wheezes. Abdominal: Soft. She exhibits no distension. Neurological: She is alert. Skin: Skin is warm. Psychiatric: She has a normal mood and affect. Nursing note and vitals reviewed. MDM  Number of Diagnoses or Management Options  Abnormal laboratory test result:   Dementia with behavioral disturbance, unspecified dementia type:   Diagnosis management comments: Pt is not compliant with IVF and nursing staff unable to draw blood. Labs from [facility] on 8/21 reviewed by myself and Dr. James Mitchell. He evaluated pt at bedside and agrees that we can discharge patient home without new labs, ua and or further ED work up. No concerning labs to warrant aggressive interventions. Pt is at baseline, denies new/concerning sx and patient's family agree that there is no change or concerning sx.        Amount and/or Complexity of Data Reviewed  Clinical lab tests: ordered  Tests in the radiology section of CPT®: reviewed and ordered      ED Course       Procedures

## 2017-08-23 NOTE — DISCHARGE INSTRUCTIONS
Dementia: Care Instructions  Your Care Instructions  Dementia is a loss of mental skills that affects your daily life. It is different than the occasional trouble with memory that is part of aging. You may find it hard to remember things that you feel you should be able to remember. Or you may feel that your mind is just not working as well as usual.  Finding out that you have dementia is a shock. You may be afraid and worried about how the condition will change your life. Although there is no cure at this time, medicine may slow memory loss and improve thinking for a while. Other medicines may be able to help you sleep or cope with depression and behavior changes. Dementia often gets worse slowly. But it can get worse quickly. As dementia gets worse, it may become harder to do common things that take planning, like making a list and going shopping. Over time, the disease may make it hard for you to take care of yourself. Some people with dementia need others to help care for them. Dementia is different for everyone. You may be able to function well for a long time. In the early stage of the condition, you can do things at home to make life easier and safer. You also can keep doing your hobbies and other activities. Many people find comfort in planning now for their future needs. Follow-up care is a key part of your treatment and safety. Be sure to make and go to all appointments, and call your doctor if you are having problems. Its also a good idea to know your test results and keep a list of the medicines you take. How can you care for yourself at home? · Take your medicines exactly as prescribed. Call your doctor if you think you are having a problem with your medicine. · Eat healthy foods. Eat lots of whole grains, fruits, and vegetables every day.  If you are not hungry, try snacks or nutritional drinks such as Boost, Ensure, or Sustacal.  · If you have problems sleeping:  ¨ Try not to nap too close to your bedtime. ¨ Exercise regularly. Walking is a good choice. ¨ Try a glass of warm milk or caffeine-free herbal tea before bed. · Do tasks and activities during the time of day when you feel your best. It may help to develop a daily routine. · Post labels, lists, and sticky notes to help you remember things. Write your activities on a calendar you can easily find. Put your clock where you can easily see it. · Stay active. Take walks in familiar places, or with friends or loved ones. Try to stay active mentally too. Read and work crossword puzzles if you enjoy these activities. · Do not drive unless you can pass an on-road driving test. If you are not sure if you are safe to drive, your state s license bureau can test you. · Keep a cordless phone and a flashlight with new batteries by your bed. If possible, put a phone in each of the main rooms of your house, or carry a cell phone in case you fall and cannot reach a phone. Or, you can wear a device around your neck or wrist. You push a button that sends a signal for help. Acknowledge your emotions and plan for the future  · Talk openly and honestly with your doctor. · Let yourself grieve. It is common to feel angry, scared, frustrated, anxious, or depressed. · Get emotional support from family, friends, a support group, or a counselor experienced in working with people who have dementia. · Ask for help if you need it. · Plan for the future. ¨ Talk to your family and doctor about preparing a living will and other important papers while you can make decisions. These papers tell your doctors how to care for you at the end of your life. ¨ Consider naming a person to make decisions about your care if you are not able to. When should you call for help? Call 911 anytime you think you may need emergency care. For example, call if:  · You are lost and do not know whom to call. · You are injured and do not know whom to call.   Call your doctor now or seek immediate medical care if:  · You are more confused or upset than usual.  · You feel like you could hurt yourself because your mind is not working well. Watch closely for changes in your health, and be sure to contact your doctor if you have any problems. Where can you learn more? Go to http://abner-gabino.info/. Enter V156 in the search box to learn more about \"Dementia: Care Instructions. \"  Current as of: July 26, 2016  Content Version: 11.3  © 2341-7871 [employer]. Care instructions adapted under license by [email or web address] (which disclaims liability or warranty for this information). If you have questions about a medical condition or this instruction, always ask your healthcare professional. [address] any warranty or liability for your use of this information. Helping a Person With Alzheimer's Disease: Care Instructions  Your Care Instructions  Alzheimer's disease is a type of dementia. It affects memory, intelligence, judgment, language, and behavior. It is not clear what causes this disease. But it is the most common form of dementia in older adults. It may take many years to develop. Alzheimer's disease is different than mild memory loss that occurs with aging. Family members usually notice symptoms first. But the person also may realize that something is wrong. Follow-up care is a key part of your loved one's treatment and safety. Be sure to make and go to all appointments, and call your doctor if your loved one is having problems. It's also a good idea to know your loved one's test results and keep a list of the medicines he or she takes. How can you care for your loved one at home? · Develop a routine. The person will feel less frustrated or confused with a clear, simple daily plan. Remind him or her about important facts and events. · Be patient.  It may take longer for the person to complete a task than it used to.  · Help the person eat a balanced diet. Serve plenty of whole grains, fruits, and vegetables every day. If the person is not eating well at mealtimes, give snacks at midmorning and in the afternoon. Offer drinks such as Boost, Ensure, or Sustacal if he or she is losing weight. · Encourage exercise. Walking and other activity may slow the decline of mental ability. Help the person keep an active mind. Encourage hobbies such as reading and crossword puzzles. · Take steps to help if the person is sundowning. This is the restless behavior and trouble with sleeping that may occur in late afternoon and at night. Try not to let the person nap during the day. Offer a glass of warm milk or caffeine-free tea before bedtime. · Ask family members and friends for help. You may need breaks where others can help care for the person. · Talk to the person's doctor about what resources are available for help in your area. · Review all of the person's medicines with his or her doctor. · For as long as the person is able, allow him or her to make decisions about activities, food, clothing, and other choices. Let the person be independent, even if tasks take more time or are not done perfectly. Tailor tasks to the person's abilities. For example, if cooking is no longer safe, ask for other help. He or she can help set the table or make simple dishes such as a salad. When the person needs help, offer it gently. Keeping safe  · Make your home (or the person's home) safe. Tack down rugs, and put no-slip tape in the tub. Install handrails, and put safety switches on stoves and appliances. Keep rooms free of clutter. Make sure walkways around furniture are clear. Do not move furniture around, because the person may become confused. · Use locks on doors and cupboards. Lock up knives, scissors, medicines, cleaning supplies, and other dangerous things. · Do not let the person drive or cook if he or she cannot do it safely.  A person with Alzheimer's should not drive unless he or she is able to pass an on-road driving test. Your state 's license bureau can do a driving test if there is any question. · Get medical alert jewelry for the person so you can be contacted if he or she wanders away. If possible, provide a safe place for wandering, such as an enclosed yard or garden. When should you call for help? Call 911 anytime you think you may need emergency care. For example, call if:  · A person who has Alzheimer's disease has disappeared. · A person who has Alzheimer's disease is seriously injured. Call your doctor now or seek immediate medical care if:  · The person you are caring for suddenly sees or hears things that are not there (hallucinates). · The person you are caring for has a sudden, drastic change in his or her behavior. Watch closely for changes in your loved one's health, and be sure to contact the doctor if:  · A person who has Alzheimer's disease gradually gets worse or has symptoms that could cause injury. · You need help caring for a person with Alzheimer's disease. · The person has problems with his or her medicine. Where can you learn more? Go to http://abner-gabino.info/. Enter I638 in the search box to learn more about \"Helping a Person With Alzheimer's Disease: Care Instructions. \"  Current as of: July 26, 2016  Content Version: 11.3  © 4214-6632 [employer]. Care instructions adapted under license by [employer] (which disclaims liability or warranty for this information). If you have questions about a medical condition or this instruction, always ask your healthcare professional. [address] any warranty or liability for your use of this information.

## 2017-08-23 NOTE — ED NOTES
Two unsuccessful  attempts made by ED tech Brandy to obtain peripheral IV or lab specimen. Will make a third attempt, will continue to monitor, provider notified, patient is asking for food.

## 2017-08-23 NOTE — ED NOTES
Unable to obtain blood specimen off of IV line, several attempts to perform venipuncture to obtain blood sample, provider notified

## 2017-08-24 ENCOUNTER — HOSPITAL ENCOUNTER (OUTPATIENT)
Dept: CT IMAGING | Age: 82
Discharge: HOME | End: 2017-08-24
Attending: FAMILY MEDICINE
Payer: MEDICARE

## 2017-08-24 DIAGNOSIS — R10.9 ABDOMINAL PAIN: ICD-10-CM

## 2017-08-24 DIAGNOSIS — R63.4 WEIGHT LOSS: ICD-10-CM

## 2017-08-24 PROCEDURE — 74176 CT ABD & PELVIS W/O CONTRAST: CPT

## 2017-09-08 ENCOUNTER — APPOINTMENT (OUTPATIENT)
Dept: GENERAL RADIOLOGY | Age: 82
DRG: 689 | End: 2017-09-08
Attending: EMERGENCY MEDICINE
Payer: MEDICARE

## 2017-09-08 ENCOUNTER — HOSPITAL ENCOUNTER (INPATIENT)
Age: 82
LOS: 4 days | Discharge: NURSING, REHAB OR LONG-TERM CARE | DRG: 689 | End: 2017-09-12
Attending: EMERGENCY MEDICINE | Admitting: FAMILY MEDICINE
Payer: MEDICARE

## 2017-09-08 DIAGNOSIS — R31.9 URINARY TRACT INFECTION WITH HEMATURIA, SITE UNSPECIFIED: ICD-10-CM

## 2017-09-08 DIAGNOSIS — E87.0 HYPERNATREMIA: Primary | ICD-10-CM

## 2017-09-08 DIAGNOSIS — N39.0 URINARY TRACT INFECTION WITH HEMATURIA, SITE UNSPECIFIED: ICD-10-CM

## 2017-09-08 DIAGNOSIS — E86.0 DEHYDRATION: ICD-10-CM

## 2017-09-08 DIAGNOSIS — E87.20 METABOLIC ACIDOSIS, NORMAL ANION GAP (NAG): ICD-10-CM

## 2017-09-08 LAB
ALBUMIN SERPL-MCNC: 2.7 G/DL (ref 3.4–5)
ALBUMIN/GLOB SERPL: 0.5 {RATIO} (ref 0.8–1.7)
ALP SERPL-CCNC: 93 U/L (ref 45–117)
ALT SERPL-CCNC: 21 U/L (ref 13–56)
ANION GAP SERPL CALC-SCNC: 11 MMOL/L (ref 3–18)
APPEARANCE UR: ABNORMAL
ARTERIAL PATENCY WRIST A: YES
AST SERPL-CCNC: 16 U/L (ref 15–37)
BACTERIA URNS QL MICRO: POSITIVE /HPF
BASE DEFICIT BLD-SCNC: 14 MMOL/L
BASOPHILS # BLD: 0 K/UL (ref 0–0.06)
BASOPHILS NFR BLD: 0 % (ref 0–3)
BDY SITE: ABNORMAL
BILIRUB SERPL-MCNC: 0.3 MG/DL (ref 0.2–1)
BILIRUB UR QL: NEGATIVE
BODY TEMPERATURE: 98.7
BUN SERPL-MCNC: 70 MG/DL (ref 7–18)
BUN/CREAT SERPL: 56 (ref 12–20)
CALCIUM SERPL-MCNC: 8.8 MG/DL (ref 8.5–10.1)
CHLORIDE SERPL-SCNC: 129 MMOL/L (ref 100–108)
CO2 SERPL-SCNC: 11 MMOL/L (ref 21–32)
COLOR UR: YELLOW
CREAT SERPL-MCNC: 1.24 MG/DL (ref 0.6–1.3)
DIFFERENTIAL METHOD BLD: ABNORMAL
EOSINOPHIL # BLD: 0.1 K/UL (ref 0–0.4)
EOSINOPHIL NFR BLD: 1 % (ref 0–5)
ERYTHROCYTE [DISTWIDTH] IN BLOOD BY AUTOMATED COUNT: 17.7 % (ref 11.6–14.5)
GAS FLOW.O2 O2 DELIVERY SYS: ABNORMAL L/MIN
GLOBULIN SER CALC-MCNC: 5.4 G/DL (ref 2–4)
GLUCOSE SERPL-MCNC: 133 MG/DL (ref 74–99)
GLUCOSE UR STRIP.AUTO-MCNC: NEGATIVE MG/DL
HCO3 BLD-SCNC: 10 MMOL/L (ref 22–26)
HCT VFR BLD AUTO: 47.3 % (ref 35–45)
HGB BLD-MCNC: 15.9 G/DL (ref 12–16)
HGB UR QL STRIP: ABNORMAL
KETONES UR QL STRIP.AUTO: NEGATIVE MG/DL
LACTATE BLD-SCNC: 2 MMOL/L (ref 0.4–2)
LEUKOCYTE ESTERASE UR QL STRIP.AUTO: ABNORMAL
LYMPHOCYTES # BLD: 4.1 K/UL (ref 0.8–3.5)
LYMPHOCYTES NFR BLD: 33 % (ref 20–51)
MAGNESIUM SERPL-MCNC: 2.7 MG/DL (ref 1.6–2.6)
MCH RBC QN AUTO: 30.2 PG (ref 24–34)
MCHC RBC AUTO-ENTMCNC: 33.6 G/DL (ref 31–37)
MCV RBC AUTO: 89.9 FL (ref 74–97)
MONOCYTES # BLD: 0.9 K/UL (ref 0–1)
MONOCYTES NFR BLD: 7 % (ref 2–9)
NEUTS BAND NFR BLD MANUAL: 5 % (ref 0–5)
NEUTS SEG # BLD: 7.4 K/UL (ref 1.8–8)
NEUTS SEG NFR BLD: 54 % (ref 42–75)
NITRITE UR QL STRIP.AUTO: POSITIVE
PCO2 BLD: 19.6 MMHG (ref 35–45)
PH BLD: 7.32 [PH] (ref 7.35–7.45)
PH UR STRIP: 6.5 [PH] (ref 5–8)
PLATELET # BLD AUTO: 315 K/UL (ref 135–420)
PLATELET COMMENTS,PCOM: ABNORMAL
PMV BLD AUTO: 10.6 FL (ref 9.2–11.8)
PO2 BLD: 123 MMHG (ref 80–100)
POTASSIUM SERPL-SCNC: 4 MMOL/L (ref 3.5–5.5)
PROT SERPL-MCNC: 8.1 G/DL (ref 6.4–8.2)
PROT UR STRIP-MCNC: 100 MG/DL
RBC # BLD AUTO: 5.26 M/UL (ref 4.2–5.3)
RBC #/AREA URNS HPF: POSITIVE /HPF (ref 0–5)
RBC MORPH BLD: ABNORMAL
SALICYLATES SERPL-MCNC: <2.8 MG/DL (ref 2.8–20)
SAO2 % BLD: 99 % (ref 92–97)
SERVICE CMNT-IMP: ABNORMAL
SODIUM SERPL-SCNC: 151 MMOL/L (ref 136–145)
SP GR UR REFRACTOMETRY: 1.02 (ref 1–1.03)
SPECIMEN TYPE: ABNORMAL
T4 FREE SERPL-MCNC: 1.4 NG/DL (ref 0.7–1.5)
TSH SERPL DL<=0.05 MIU/L-ACNC: 0.9 UIU/ML (ref 0.36–3.74)
UROBILINOGEN UR QL STRIP.AUTO: 0.2 EU/DL (ref 0.2–1)
WBC # BLD AUTO: 12.5 K/UL (ref 4.6–13.2)
WBC URNS QL MICRO: ABNORMAL /HPF (ref 0–4)

## 2017-09-08 PROCEDURE — 87186 SC STD MICRODIL/AGAR DIL: CPT | Performed by: EMERGENCY MEDICINE

## 2017-09-08 PROCEDURE — 80053 COMPREHEN METABOLIC PANEL: CPT | Performed by: EMERGENCY MEDICINE

## 2017-09-08 PROCEDURE — 74011000258 HC RX REV CODE- 258: Performed by: EMERGENCY MEDICINE

## 2017-09-08 PROCEDURE — 83735 ASSAY OF MAGNESIUM: CPT | Performed by: EMERGENCY MEDICINE

## 2017-09-08 PROCEDURE — 84439 ASSAY OF FREE THYROXINE: CPT | Performed by: EMERGENCY MEDICINE

## 2017-09-08 PROCEDURE — 83605 ASSAY OF LACTIC ACID: CPT

## 2017-09-08 PROCEDURE — 71010 XR CHEST PORT: CPT

## 2017-09-08 PROCEDURE — 96361 HYDRATE IV INFUSION ADD-ON: CPT

## 2017-09-08 PROCEDURE — 87077 CULTURE AEROBIC IDENTIFY: CPT | Performed by: EMERGENCY MEDICINE

## 2017-09-08 PROCEDURE — 82803 BLOOD GASES ANY COMBINATION: CPT

## 2017-09-08 PROCEDURE — 65660000000 HC RM CCU STEPDOWN

## 2017-09-08 PROCEDURE — 84443 ASSAY THYROID STIM HORMONE: CPT | Performed by: EMERGENCY MEDICINE

## 2017-09-08 PROCEDURE — 81001 URINALYSIS AUTO W/SCOPE: CPT | Performed by: EMERGENCY MEDICINE

## 2017-09-08 PROCEDURE — 87040 BLOOD CULTURE FOR BACTERIA: CPT | Performed by: EMERGENCY MEDICINE

## 2017-09-08 PROCEDURE — 36600 WITHDRAWAL OF ARTERIAL BLOOD: CPT

## 2017-09-08 PROCEDURE — 99285 EMERGENCY DEPT VISIT HI MDM: CPT

## 2017-09-08 PROCEDURE — 85025 COMPLETE CBC W/AUTO DIFF WBC: CPT | Performed by: EMERGENCY MEDICINE

## 2017-09-08 PROCEDURE — 96360 HYDRATION IV INFUSION INIT: CPT

## 2017-09-08 PROCEDURE — 74011250636 HC RX REV CODE- 250/636: Performed by: EMERGENCY MEDICINE

## 2017-09-08 PROCEDURE — 80307 DRUG TEST PRSMV CHEM ANLYZR: CPT | Performed by: EMERGENCY MEDICINE

## 2017-09-08 PROCEDURE — 87086 URINE CULTURE/COLONY COUNT: CPT | Performed by: EMERGENCY MEDICINE

## 2017-09-08 RX ORDER — CEFTRIAXONE 1 G/1
1 INJECTION, POWDER, FOR SOLUTION INTRAMUSCULAR; INTRAVENOUS
Status: DISCONTINUED | OUTPATIENT
Start: 2017-09-08 | End: 2017-09-08 | Stop reason: CLARIF

## 2017-09-08 RX ADMIN — CEFTRIAXONE 1 G: 1 INJECTION, POWDER, FOR SOLUTION INTRAMUSCULAR; INTRAVENOUS at 23:36

## 2017-09-08 RX ADMIN — SODIUM CHLORIDE 500 ML: 900 INJECTION, SOLUTION INTRAVENOUS at 21:07

## 2017-09-08 RX ADMIN — SODIUM CHLORIDE 500 ML: 900 INJECTION, SOLUTION INTRAVENOUS at 20:40

## 2017-09-08 NOTE — IP AVS SNAPSHOT
Tiffanie Ventura 
 
 
 [address] [facility] [address] Patient: Nicole Hall MRN: [id number] Current Discharge Medication List  
  
START taking these medications Dose & Instructions Dispensing Information Comments Morning Noon Evening Bedtime  
 trimethoprim-sulfamethoxazole  mg per tablet Commonly known as:  Destinee [place] Your last dose was: Your next dose is:    
   
   
 Dose:  1 Tab Take 1 Tab by mouth every twelve (12) hours for 3 days. Quantity:  5 Tab Refills:  0 CONTINUE these medications which have NOT CHANGED Dose & Instructions Dispensing Information Comments Morning Noon Evening Bedtime  
 bisacodyl 10 mg suppository Commonly known as:  DULCOLAX Your last dose was: Your next dose is:    
   
   
 Dose:  10 mg Insert 10 mg into rectum daily as needed. Quantity:  30 Suppository Refills:  0  
     
   
   
   
  
 erythromycin base 250 mg tablet Commonly known as:  E-MYCIN Your last dose was: Your next dose is:    
   
   
 Dose:  250 mg Take 1 Tab by mouth four (4) times daily. Quantity:  90 Tab Refills:  0  
     
   
   
   
  
 famotidine 20 mg tablet Commonly known as:  PEPCID Your last dose was: Your next dose is:    
   
   
 Dose:  20 mg Take 1 Tab by mouth daily. Quantity:  30 Tab Refills:  0 HYDROcodone-acetaminophen 5-325 mg per tablet Commonly known as:  Franchesca Nayana Your last dose was: Your next dose is:    
   
   
 Dose:  1 Tab Take 1 Tab by mouth every four (4) hours as needed. Max Daily Amount: 6 Tabs. Quantity:  40 Tab Refills:  0  
     
   
   
   
  
 levothyroxine 25 mcg tablet Commonly known as:  SYNTHROID Your last dose was: Your next dose is:    
   
   
 Dose:  25 mcg Take 1 Tab by mouth Daily (before breakfast). Quantity:  30 Tab Refills:  0 LORazepam 0.5 mg tablet Commonly known as:  ATIVAN Your last dose was: Your next dose is:    
   
   
 Dose:  0.5 mg Take 1 Tab by mouth three (3) times daily as needed. Max Daily Amount: 1.5 mg.  
 Quantity:  90 Tab Refills:  0  
     
   
   
   
  
 magnesium hydroxide 400 mg/5 mL suspension Commonly known as:  MILK OF MAGNESIA Your last dose was: Your next dose is:    
   
   
 Dose:  30 mL Take 30 mL by mouth daily as needed for Constipation. Quantity:  1 Bottle Refills:  0  
     
   
   
   
  
 nitroglycerin 0.4 mg SL tablet Commonly known as:  NITROSTAT Your last dose was: Your next dose is:    
   
   
 Dose:  0.4 mg  
1 Tab by SubLINGual route every five (5) minutes as needed for Chest Pain for 3 doses. Quantity:  25 Tab Refills:  0  
     
   
   
   
  
 ondansetron 4 mg disintegrating tablet Commonly known as:  ZOFRAN ODT Your last dose was: Your next dose is:    
   
   
 Dose:  4 mg Take 1 Tab by mouth every eight (8) hours as needed. Quantity:  90 Tab Refills:  0  
     
   
   
   
  
 polyethylene glycol 17 gram packet Commonly known as:  Jose Angel Holster Your last dose was: Your next dose is:    
   
   
 Dose:  17 g Take 1 Packet by mouth daily. Quantity:  30 Packet Refills:  0  
     
   
   
   
  
 risperiDONE 0.25 mg tablet Commonly known as:  RisperDAL Your last dose was: Your next dose is:    
   
   
 Dose:  0.25 mg Take 1 Tab by mouth nightly. Quantity:  30 Tab Refills:  0 STOP taking these medications   
 dicyclomine 10 mg/5 mL Soln oral solution Commonly known as:  BENTYL Where to Get Your Medications Information on where to get these meds will be given to you by the nurse or doctor. ! Ask your nurse or doctor about these medications  
  trimethoprim-sulfamethoxazole  mg per tablet

## 2017-09-08 NOTE — ED PROVIDER NOTES
HPI Comments: 19:45  Xochitl Felipe is a 80 y.o. female with pertinent PMHx of dementia, CHF, and HLD, presenting via ambulance from [address] to the ED c/o abnormal lab values. Per nursing home, pt has been \"slightly\" more altered than her baseline, but has baseline dementia and is only A+Ox1 at baseline. Metabolic panel showed CO2 earlier at 11, decreased to 8. THIS IS A DNR PATIENT. HPI limited as pt is only oriented to self. PCP: Alexander Bullock MD    The history is provided by the EMS personnel. The history is limited by the condition of the patient. No  was used. Past Medical History:   Diagnosis Date    Acute diastolic heart failure (HCC)     Arthritis     Chest pain, unspecified     Noncardiac likely    Hyperlipidemia     Mitral valve disorders     Nocturia     Pneumonia     Transfusion history     No history of receiving blood or blood product transfusion(s). 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     Unspecified urinary incontinence     Urinary tract infection, site not specified        Past Surgical History:   Procedure Laterality Date    ABDOMEN SURGERY PROC UNLISTED      intestines, stricture    HX CHOLECYSTECTOMY      HX HEMORRHOIDECTOMY           Family History:   Problem Relation Age of Onset    Heart Attack Father 72    Sudden Death Neg Hx        Social History     Social History    Marital status:      Spouse name: N/A    Number of children: N/A    Years of education: N/A     Occupational History    Not on file.      Social History Main Topics    Smoking status: Former Smoker     Quit date: 10/18/2002    Smokeless tobacco: Never Used    Alcohol use No    Drug use: No    Sexual activity: Not Currently     Other Topics Concern    Not on file     Social History Narrative         ALLERGIES: Ciprofloxacin    Review of Systems   Unable to perform ROS: Dementia Vitals:    09/08/17 2000 09/08/17 2002 09/08/17 2045   BP: 116/61     Pulse: 92     Resp: 23     Temp:   98.4 °F (36.9 °C)   SpO2:  100%             Physical Exam   Constitutional: No distress. Thin and frail   HENT:   Head: Normocephalic and atraumatic. Mouth/Throat: Oropharynx is clear and moist. Mucous membranes are dry. Eyes: Conjunctivae and EOM are normal. No scleral icterus. Left pupil is round. Right corneal opacity, left pupil is irregular   Neck: Trachea normal and normal range of motion. Neck supple. No JVD present. No thyromegaly present. Cardiovascular: Normal rate, regular rhythm, S1 normal and S2 normal.  Exam reveals no gallop and no friction rub. No murmur heard. Pulmonary/Chest: Effort normal and breath sounds normal. No accessory muscle usage. No respiratory distress. Abdominal: Soft. Normal appearance. She exhibits no distension. There is no tenderness. There is no rigidity, no rebound and no guarding. Musculoskeletal: Normal range of motion. She exhibits no edema or tenderness. Neurological: She is alert. She has normal strength. No cranial nerve deficit or sensory deficit. Coordination normal.   Oriented to person only, cooperative, moving all extremities spontaneously   Skin: Skin is warm and intact. No rash noted. Psychiatric: She has a normal mood and affect. Vitals reviewed. MDM  Number of Diagnoses or Management Options  Dehydration:   Hypernatremia:   Metabolic acidosis, normal anion gap (NAG):   Urinary tract infection with hematuria, site unspecified:   Diagnosis management comments: Russ Mcburney is a 80 y.o. Female coming in from the SNF for low CO2 on outpatient labs. CO2 low here, but normal anion gap. Lactate 2, with WBC of 12. Normal HR and BP, no evidence of severe sepsis. Na 151 and appears dehydrated. Obvious UTI on UA. Will treat for UTI and dehydration/hypernatremia. Will admit for further care.        Amount and/or Complexity of Data Reviewed  Clinical lab tests: ordered and reviewed  Decide to obtain previous medical records or to obtain history from someone other than the patient: yes (EMS, nursing home record)  Obtain history from someone other than the patient: yes (EMS, nursing home record)  Review and summarize past medical records: yes  Independent visualization of images, tracings, or specimens: yes      ED Course       Procedures    Vitals:  Patient Vitals for the past 12 hrs:   Temp Pulse Resp BP SpO2   09/08/17 2045 98.4 °F (36.9 °C) - - - -   09/08/17 2002 - - - - 100 %   09/08/17 2000 - 92 23 116/61 -       Medications Ordered:  Medications   cefTRIAXone (ROCEPHIN) 1 g in 0.9% sodium chloride (MBP/ADV) 50 mL MBP (not administered)   sodium chloride 0.9 % bolus infusion 500 mL (500 mL IntraVENous New Bag 9/8/17 2040)   sodium chloride 0.9 % bolus infusion 500 mL (500 mL IntraVENous New Bag 9/8/17 2107)       Lab Findings:  Recent Results (from the past 12 hour(s))   CBC WITH AUTOMATED DIFF    Collection Time: 09/08/17  8:26 PM   Result Value Ref Range    WBC 12.5 4.6 - 13.2 K/uL    RBC 5.26 4.20 - 5.30 M/uL    HGB 15.9 12.0 - 16.0 g/dL    HCT 47.3 (H) 35.0 - 45.0 %    MCV 89.9 74.0 - 97.0 FL    MCH 30.2 24.0 - 34.0 PG    MCHC 33.6 31.0 - 37.0 g/dL    RDW 17.7 (H) 11.6 - 14.5 %    PLATELET 904 339 - 668 K/uL    MPV 10.6 9.2 - 11.8 FL    NEUTROPHILS 54 42 - 75 %    BAND NEUTROPHILS 5 0 - 5 %    LYMPHOCYTES 33 20 - 51 %    MONOCYTES 7 2 - 9 %    EOSINOPHILS 1 0 - 5 %    BASOPHILS 0 0 - 3 %    ABS. NEUTROPHILS 7.4 1.8 - 8.0 K/UL    ABS. LYMPHOCYTES 4.1 (H) 0.8 - 3.5 K/UL    ABS. MONOCYTES 0.9 0 - 1.0 K/UL    ABS. EOSINOPHILS 0.1 0.0 - 0.4 K/UL    ABS.  BASOPHILS 0.0 0.0 - 0.06 K/UL    DF MANUAL      PLATELET COMMENTS ADEQUATE PLATELETS      RBC COMMENTS ANISOCYTOSIS  1+       POC LACTIC ACID    Collection Time: 09/08/17  8:39 PM   Result Value Ref Range    Lactic Acid (POC) 2.0 0.4 - 2.0 mmol/L   URINALYSIS W/ RFLX MICROSCOPIC Collection Time: 09/08/17  8:45 PM   Result Value Ref Range    Color YELLOW      Appearance TURBID      Specific gravity 1.016 1.005 - 1.030      pH (UA) 6.5 5.0 - 8.0      Protein 100 (A) NEG mg/dL    Glucose NEGATIVE  NEG mg/dL    Ketone NEGATIVE  NEG mg/dL    Bilirubin NEGATIVE  NEG      Blood LARGE (A) NEG      Urobilinogen 0.2 0.2 - 1.0 EU/dL    Nitrites POSITIVE (A) NEG      Leukocyte Esterase LARGE (A) NEG     URINE MICROSCOPIC ONLY    Collection Time: 09/08/17  8:45 PM   Result Value Ref Range    WBC TOO NUMEROUS TO COUNT 0 - 4 /hpf    RBC POSITIVE 0 - 5 /hpf    Bacteria POSITIVE (A) NEG /hpf   SALICYLATE    Collection Time: 09/08/17  9:04 PM   Result Value Ref Range    SALICYLATE <8.7 (L) 2.8 - 20.0 MG/DL   T4, FREE    Collection Time: 09/08/17  9:04 PM   Result Value Ref Range    T4, Free 1.4 0.7 - 1.5 NG/DL   MAGNESIUM    Collection Time: 09/08/17  9:04 PM   Result Value Ref Range    Magnesium 2.7 (H) 1.6 - 2.6 mg/dL   METABOLIC PANEL, COMPREHENSIVE    Collection Time: 09/08/17  9:04 PM   Result Value Ref Range    Sodium 151 (H) 136 - 145 mmol/L    Potassium 4.0 3.5 - 5.5 mmol/L    Chloride 129 (H) 100 - 108 mmol/L    CO2 11 (L) 21 - 32 mmol/L    Anion gap 11 3.0 - 18 mmol/L    Glucose 133 (H) 74 - 99 mg/dL    BUN 70 (H) 7.0 - 18 MG/DL    Creatinine 1.24 0.6 - 1.3 MG/DL    BUN/Creatinine ratio 56 (H) 12 - 20      GFR est AA 48 (L) >60 ml/min/1.73m2    GFR est non-AA 40 (L) >60 ml/min/1.73m2    Calcium 8.8 8.5 - 10.1 MG/DL    Bilirubin, total 0.3 0.2 - 1.0 MG/DL    ALT (SGPT) 21 13 - 56 U/L    AST (SGOT) 16 15 - 37 U/L    Alk.  phosphatase 93 45 - 117 U/L    Protein, total 8.1 6.4 - 8.2 g/dL    Albumin 2.7 (L) 3.4 - 5.0 g/dL    Globulin 5.4 (H) 2.0 - 4.0 g/dL    A-G Ratio 0.5 (L) 0.8 - 1.7     TSH 3RD GENERATION    Collection Time: 09/08/17  9:04 PM   Result Value Ref Range    TSH 0.90 0.36 - 3.74 uIU/mL   POC G3    Collection Time: 09/08/17  9:05 PM   Result Value Ref Range    Device: ROOM AIR      pH (POC) 7.319 (L) 7.35 - 7.45      pCO2 (POC) 19.6 (L) 35.0 - 45.0 MMHG    pO2 (POC) 123 (H) 80 - 100 MMHG    HCO3 (POC) 10.0 (L) 22 - 26 MMOL/L    sO2 (POC) 99 (H) 92 - 97 %    Base deficit (POC) 14 mmol/L    Allens test (POC) YES      Site RIGHT BRACHIAL      Patient temp. 98.7      Specimen type (POC) ARTERIAL      Performed by Panfilo Childs        X-ray, CT or radiology findings or impressions:  XR CHEST PORT    (Results Pending)   CXR 1 V interpreted by myself showed no acute process. No pneumothorax, no mass, no consolidation. Progress notes, consult notes, or additional procedure notes:  10:47 PM Consult: I discussed care with Dr. Philly Davis (Family practice). It was a standard discussion including patient history, chief complaint, available diagnostic results, and predicted treatment course. Dr Lincoln Coronado accepted admission. 10:48 PM  I reevaluated pt. She was sleeping comfortably in bed. The results of their tests and reasons for their admission have been discussed with them and/or available family. UTI treatment was discussed. They convey agreement and understanding for the need to be admitted and for their admission diagnosis. Diagnosis:   1. Hypernatremia    2. Metabolic acidosis, normal anion gap (NAG)    3. Dehydration    4. Urinary tract infection with hematuria, site unspecified        Disposition: ADMIT    Follow-up Information     None           Patient's Medications   Start Taking    No medications on file   Continue Taking    BISACODYL (DULCOLAX) 10 MG SUPPOSITORY    Insert 10 mg into rectum daily as needed. DICYCLOMINE (BENTYL) 10 MG/5 ML SOLN ORAL SOLUTION    Take 5 mL by mouth four (4) times daily. ERYTHROMYCIN BASE (E-MYCIN) 250 MG TABLET    Take 1 Tab by mouth four (4) times daily. FAMOTIDINE (PEPCID) 20 MG TABLET    Take 1 Tab by mouth daily. HYDROCODONE-ACETAMINOPHEN (NORCO) 5-325 MG PER TABLET    Take 1 Tab by mouth every four (4) hours as needed.  Max Daily Amount: 6 Tabs. LEVOTHYROXINE (SYNTHROID) 25 MCG TABLET    Take 1 Tab by mouth Daily (before breakfast). LORAZEPAM (ATIVAN) 0.5 MG TABLET    Take 1 Tab by mouth three (3) times daily as needed. Max Daily Amount: 1.5 mg. MAGNESIUM HYDROXIDE (MILK OF MAGNESIA) 400 MG/5 ML SUSPENSION    Take 30 mL by mouth daily as needed for Constipation. NITROGLYCERIN (NITROSTAT) 0.4 MG SL TABLET    1 Tab by SubLINGual route every five (5) minutes as needed for Chest Pain for 3 doses. ONDANSETRON (ZOFRAN ODT) 4 MG DISINTEGRATING TABLET    Take 1 Tab by mouth every eight (8) hours as needed. POLYETHYLENE GLYCOL (MIRALAX) 17 GRAM PACKET    Take 1 Packet by mouth daily. RISPERIDONE (RISPERDAL) 0.25 MG TABLET    Take 1 Tab by mouth nightly. These Medications have changed    No medications on file   Stop Taking    No medications on file       Scribe Attestation      Victorina Escalera acting as a scribe for and in the presence of Awa Franks MD      September 08, 2017 at 7:55 PM       Provider Attestation:      I personally performed the services described in the documentation, reviewed the documentation, as recorded by the scribe in my presence, and it accurately and completely records my words and actions.  September 08, 2017 at 7:55 PM - Awa Franks MD

## 2017-09-08 NOTE — IP AVS SNAPSHOT
Summary of Care Report The Summary of Care report has been created to help improve care coordination. Users with access to [email or web address] or [address] (Web-based application) may access additional patient information including the Discharge Summary. If you are not currently a [facility] user and need more information, please call the number listed below in the [place] [address] section and ask to be connected with Medical Records. Facility Information Name Address Phone [address] [facility] [address] [phone] Patient Information Patient Name Sex SKYLA Santooy ([id number]) Female 1917 Discharge Information Admitting Provider Service Area Unit Xu Capone MD / Lena cardona Telemetry / [phone] Discharge Provider Discharge Date/Time Discharge Disposition Destination (none) 2017 (Pending) SNF (none) Patient Language Language ENGLISH [13] Hospital Problems as of 2017  Reviewed: 2017 12:14 PM by Xu Capone MD  
  
  
  
 Class Noted - Resolved Last Modified POA Active Problems UTI (urinary tract infection)  2017 - Present 2017 by Sadiq Mclean MD Unknown Entered by Taniya Sanders DO Dementia  2017 - Present 2017 by Sadiq Mclean MD Unknown Entered by Haylie Burger MD  
  Metabolic acidosis, normal anion gap (NAG)  2017 - Present 2017 by Sadiq Mclean MD Unknown Entered by Sadiq Mclean MD  
  Hypernatremia  2017 - Present 2017 by Sadiq Mclean MD Unknown Entered by Sadiq Mclean MD  
  
Non-Hospital Problems as of 2017  Reviewed: 2017 12:14 PM by Xu Capone MD  
  
  
  
 Class Noted - Resolved Last Modified Active Problems Incontinence  2013 - Present 2014 Entered by Anuradha Lord MD  
  Recurrent UTI  8/1/2013 - Present 8/1/2013 by Anuradha Lord MD  
  Entered by Anuradha Lord MD  
  Overactive bladder  9/3/2013 - Present 9/5/2014 Entered by Anuradha Lord MD  
  Transfusion history  Unknown - Present 10/17/2013 by Javier Fong Entered by Javier Fong Overview Signed 10/17/2013  1:40 PM by [facility]avier [facility] No history of receiving blood or blood product transfusion(s). Pneumonia  Unknown - Present 10/17/2013 by Javier Fong Entered by [facility]  
  Acute diastolic heart failure ([facility] [address]  Unknown - Present 10/17/2013 by Javier Fong Entered by [facility] Mitral regurgitation  Unknown - Present 12/9/2015 by Bard Jeannette MD  
  Entered by Javier Fong Overview Addendum 12/9/2015  1:58 PM by Bard Jeannette MD  
   12/15, 10/13 mild MR Chest pain, unspecified  Unknown - Present 10/17/2013 by Javier Fong Entered by Javier Fong Overview Signed 10/17/2013  1:42 PM by Javier Fong Noncardiac likely Undiagnosed cardiac murmurs  Unknown - Present 10/17/2013 by Javier Fong Entered by [facility] Old myocardial infarction  10/18/2013 - Present 9/5/2014 Entered by Bard Jeannette MD  
  Overview Signed 10/18/2013  4:16 PM by Bard Jeannette MD  
   2011/12 per pt Coronary atherosclerosis of native coronary artery  11/6/2013 - Present 9/5/2014 Entered by Bard Jeannette MD  
  Overview Addendum 12/23/2013 11:06 AM by Bard Jeannette MD  
   likely with wma in echo 10/13 mild infbasal ischemia Cellulitis  8/16/2014 - Present 9/5/2014 Entered by Chela Espinosa MD  
  HLD (hyperlipidemia) (Chronic)  8/17/2014 - Present 11/11/2015 by Bard Jeannette MD  
  Entered by Oh Lutz MD  
  Overview Signed 11/11/2015  2:34 PM by Bard Jeannette MD  
   On [place] Stasis ulcer of left lower extremity ([address]  8/17/2014 - Present 9/5/2014 Entered by Carla Barrera MD  
  Venous (peripheral) insufficiency (Chronic)  8/17/2014 - Present 9/5/2014 Entered by Carla Barrera MD  
  Frailty (Chronic)  8/17/2014 - Present 9/5/2014 Entered by Carla Barrera MD  
  Hypokalemia  8/17/2014 - Present 5/16/2017 by Corey Gonzalez MD  
  Entered by Carla Barrera MD  
  Anemia (Chronic)  8/17/2014 - Present 9/5/2014 Entered by Carla Barrera MD  
  Leukopenia  5/16/2017 - Present 5/16/2017 by Corey Gonzalez MD  
  Entered by Mirella Dyer, DO Abdominal pain  5/16/2017 - Present 5/16/2017 by Corey Gonzalez MD  
  Entered by Mirella Dyer, DO Abnormal CT of the abdomen  5/17/2017 - Present 5/17/2017 by Pavan Pandey MD  
  Entered by Pavan Pandey MD  
  Altered mental status, unspecified  5/18/2017 - Present 5/18/2017 by Carlie Ramires NP Entered by Carlie Ramires NP Acute encephalopathy  5/22/2017 - Present 5/22/2017 by Corey Gonzalez MD  
  Entered by Corey Gonzalez MD  
  Subcapital fracture of right femur [facility])  8/23/2017 - Present 8/23/2017 by Jennifer Carlos PA-C Entered by Jennifer Carlos PA-C You are allergic to the following Allergen Reactions Ciprofloxacin Itching Current Discharge Medication List  
  
START taking these medications Dose & Instructions Dispensing Information Comments  
 trimethoprim-sulfamethoxazole  mg per tablet Commonly known as:  Versa Honer Dose:  1 Tab Take 1 Tab by mouth every twelve (12) hours for 3 days. Quantity:  5 Tab Refills:  0 CONTINUE these medications which have NOT CHANGED Dose & Instructions Dispensing Information Comments  
 bisacodyl 10 mg suppository Commonly known as:  DULCOLAX Dose:  10 mg Insert 10 mg into rectum daily as needed. Quantity:  30 Suppository Refills:  0  
   
 erythromycin base 250 mg tablet Commonly known as:  E-MYCIN  Dose:  250 mg  
 Take 1 Tab by mouth four (4) times daily. Quantity:  90 Tab Refills:  0  
   
 famotidine 20 mg tablet Commonly known as:  PEPCID Dose:  20 mg Take 1 Tab by mouth daily. Quantity:  30 Tab Refills:  0 HYDROcodone-acetaminophen 5-325 mg per tablet Commonly known as:  Navas Sawyer Dose:  1 Tab Take 1 Tab by mouth every four (4) hours as needed. Max Daily Amount: 6 Tabs. Quantity:  40 Tab Refills:  0  
   
 levothyroxine 25 mcg tablet Commonly known as:  SYNTHROID Dose:  25 mcg Take 1 Tab by mouth Daily (before breakfast). Quantity:  30 Tab Refills:  0 LORazepam 0.5 mg tablet Commonly known as:  ATIVAN Dose:  0.5 mg Take 1 Tab by mouth three (3) times daily as needed. Max Daily Amount: 1.5 mg.  
 Quantity:  90 Tab Refills:  0  
   
 magnesium hydroxide 400 mg/5 mL suspension Commonly known as:  MILK OF MAGNESIA Dose:  30 mL Take 30 mL by mouth daily as needed for Constipation. Quantity:  1 Bottle Refills:  0  
   
 nitroglycerin 0.4 mg SL tablet Commonly known as:  NITROSTAT Dose:  0.4 mg  
1 Tab by SubLINGual route every five (5) minutes as needed for Chest Pain for 3 doses. Quantity:  25 Tab Refills:  0  
   
 ondansetron 4 mg disintegrating tablet Commonly known as:  ZOFRAN ODT Dose:  4 mg Take 1 Tab by mouth every eight (8) hours as needed. Quantity:  90 Tab Refills:  0  
   
 polyethylene glycol 17 gram packet Commonly known as:  Orysia Hamburg Dose:  17 g Take 1 Packet by mouth daily. Quantity:  30 Packet Refills:  0  
   
 risperiDONE 0.25 mg tablet Commonly known as:  RisperDAL Dose:  0.25 mg Take 1 Tab by mouth nightly. Quantity:  30 Tab Refills:  0 STOP taking these medications Comments  
 dicyclomine 10 mg/5 mL Soln oral solution Commonly known as:  BENTYL Follow-up Information Follow up With Details Comments Contact Info Trenton Henao MD In 1 day [facility] [address] [place] [address] 
[phone] Discharge Instructions Urinary Tract Infection in Women: Care Instructions Your Care Instructions A urinary tract infection, or UTI, is a general term for an infection anywhere between the kidneys and the urethra (where urine comes out). Most UTIs are bladder infections. They often cause pain or burning when you urinate. UTIs are caused by bacteria and can be cured with antibiotics. Be sure to complete your treatment so that the infection goes away. Follow-up care is a key part of your treatment and safety. Be sure to make and go to all appointments, and call your doctor if you are having problems. It's also a good idea to know your test results and keep a list of the medicines you take. How can you care for yourself at home? · Take your antibiotics as directed. Do not stop taking them just because you feel better. You need to take the full course of antibiotics. · Drink extra water and other fluids for the next day or two. This may help wash out the bacteria that are causing the infection. (If you have kidney, heart, or liver disease and have to limit fluids, talk with your doctor before you increase your fluid intake.) · Avoid drinks that are carbonated or have caffeine. They can irritate the bladder. · Urinate often. Try to empty your bladder each time. · To relieve pain, take a hot bath or lay a heating pad set on low over your lower belly or genital area. Never go to sleep with a heating pad in place. To prevent UTIs · Drink plenty of water each day. This helps you urinate often, which clears bacteria from your system. (If you have kidney, heart, or liver disease and have to limit fluids, talk with your doctor before you increase your fluid intake.) · Urinate when you need to. · Urinate right after you have sex. · Change sanitary pads often. · Avoid douches, bubble baths, feminine hygiene sprays, and other feminine hygiene products that have deodorants. · After going to the bathroom, wipe from front to back. When should you call for help? Call your doctor now or seek immediate medical care if: · Symptoms such as fever, chills, nausea, or vomiting get worse or appear for the first time. · You have new pain in your back just below your rib cage. This is called flank pain. · There is new blood or pus in your urine. · You have any problems with your antibiotic medicine. Watch closely for changes in your health, and be sure to contact your doctor if: 
· You are not getting better after taking an antibiotic for 2 days. · Your symptoms go away but then come back. Where can you learn more? Go to http://abner-gabino.info/. Enter S535 in the search box to learn more about \"Urinary Tract Infection in Women: Care Instructions. \" Current as of: November 28, 2016 Content Version: 11.3 © 2409-0851 [employer]. Care instructions adapted under license by [employer] (which disclaims liability or warranty for this information). If you have questions about a medical condition or this instruction, always ask your healthcare professional. [place] [address] any warranty or liability for your use of this information. Patient armband removed and shredded MyChart Activation Thank you for requesting access to Comfy. Please follow the instructions below to securely access and download your online medical record. Comfy allows you to send messages to your doctor, view your test results, renew your prescriptions, schedule appointments, and more. How Do I Sign Up? 1. In your internet browser, go to [email or web address] 
2. Click on the First Time User? Click Here link in the Sign In box. You will be redirect to the New Member Sign Up page. 3. Enter your Biotzt Access Code exactly as it appears below. You will not need to use this code after youve completed the sign-up process. If you do not sign up before the expiration date, you must request a new code. My Study Rewardshart Access Code: Activation code not generated Current Taggify Status: Patient Declined (This is the date your MyChart access code will ) 4. Enter the last four digits of your Social Security Number (xxxx) and Date of Birth (mm/dd/yyyy) as indicated and click Submit. You will be taken to the next sign-up page. 5. Create a Biotzt ID. This will be your Taggify login ID and cannot be changed, so think of one that is secure and easy to remember. 6. Create a Biotzt password. You can change your password at any time. 7. Enter your Password Reset Question and Answer. This can be used at a later time if you forget your password. 8. Enter your e-mail address. You will receive e-mail notification when new information is available in [address] 9. Click Sign Up. You can now view and download portions of your medical record. 10. Click the Download Summary menu link to download a portable copy of your medical information. Additional Information If you have questions, please visit the Frequently Asked Questions section of the Taggify website at [email or web address] Remember, Taggify is NOT to be used for urgent needs. For medical emergencies, dial 911. DISCHARGE SUMMARY from Nurse The following personal items are in your possession at time of discharge: 
 
Dental Appliances: Lowers, Uppers, Sent home Visual Aid: None Home Medications: None Jewelry: None Clothing: None Other Valuables: None Personal Items Sent to Safe: None PATIENT INSTRUCTIONS: 
 
 
F-face looks uneven A-arms unable to move or move unevenly S-speech slurred or non-existent T-time-call 911 as soon as signs and symptoms begin-DO NOT go Back to bed or wait to see if you get better-TIME IS BRAIN. Warning Signs of HEART ATTACK Call 911 if you have these symptoms: 
? Chest discomfort. Most heart attacks involve discomfort in the center of the chest that lasts more than a few minutes, or that goes away and comes back. It can feel like uncomfortable pressure, squeezing, fullness, or pain. ? Discomfort in other areas of the upper body. Symptoms can include pain or discomfort in one or both arms, the back, neck, jaw, or stomach. ? Shortness of breath with or without chest discomfort. ? Other signs may include breaking out in a cold sweat, nausea, or lightheadedness. Don't wait more than five minutes to call [address]! Fast action can save your life. Calling 911 is almost always the fastest way to get lifesaving treatment. Emergency Medical Services staff can begin treatment when they arrive  up to an hour sooner than if someone gets to the hospital by car. The discharge information has been reviewed with the patient. The patient verbalized understanding. Discharge medications reviewed with the patient and appropriate educational materials and side effects teaching were provided. Patient Discharge Instructions Nely Mahoney / [id number] : 1917 Admitted 2017 Discharged: 2017 · It is important that you take the medication exactly as they are prescribed. · Keep your medication in the bottles provided by the pharmacist and keep a list of the medication names, dosages, and times to be taken with you at all times. · Do not take other medications without consulting your doctor. Recommended Diet: Comfort feeding, Resume previous diet and Ensure enlive and Ensure Pudding TID with meals Recommended Activity: PT/OT Eval and Treat Discharge to comfort care, consult hospice. Follow up with PCM for fevers, chest pain, cough, shortness of breath. Signed By: Teena Camp MD   
 September 12, 2017 Chart Review Routing History Recipient Method Report Sent By Gale Osman MD  
Fax: [phone] Phone: [phone] Fax IP Auto Routed [employer], [address] [place] [address] [[id number]] 8/18/2014  5:54 PM 08/18/2014 Dian Carpio MD  
Fax: [phone] Phone: [phone] Fax IP Auto Routed [employer], [address] [place] [address] [[id number]] 8/18/2014  5:54 PM 08/18/2014 Spencer Edwards MD  
[address] Phone: [phone] Mail IP Auto Routed [employer], [address] [place] [address] [[id number]] 8/18/2014  5:54 PM 08/18/2014 Daniela Colin MD  
Fax: [phone] Phone: [phone] Fax IP Auto Routed [employer] [[id number]] 8/23/2014  7:52 AM 08/23/2014 Alisia Osman MD  
Fax: [phone] Phone: [phone] Fax IP Auto Routed [employer] [[id number]] 8/23/2014  7:52 AM 08/23/2014 Dian Carpio MD  
Fax: [phone] Phone: [phone] Fax IP Auto Routed [employer] [[id number]] 8/26/2014  3:10 PM 08/26/2014 Alisia Osman MD  
Fax: [phone] Phone: [phone] Fax IP Auto Routed [employer] [[id number]] 8/26/2014  3:10 PM 08/26/2014 Dian Carpio MD  
Fax: [phone] Phone: [phone] Fax IP Auto Routed [employer] [[id number]] 8/27/2014 10:19 PM 08/27/2014 Alisia Osman MD  
Fax: [phone] Phone: [phone] Fax IP Auto Routed [employer] [[id number]] 8/27/2014 10:19 PM 08/27/2014  Dian Carpio MD  
 Fax: [phone] Phone: [phone] Fax IP Auto Routed [employer] [[id number]] 9/5/2014 12:36 PM 09/05/2014 Adriano Kahn MD  
Fax: [phone] Phone: [phone] Fax IP Auto Routed [employer] [[id number]] 9/5/2014 12:36 PM 09/05/2014

## 2017-09-08 NOTE — ED TRIAGE NOTES
Patient brought into the ED by EMS from [address]. When lab work was done patient was found to have an elevated CO2 level.

## 2017-09-09 PROCEDURE — 74011250636 HC RX REV CODE- 250/636: Performed by: FAMILY MEDICINE

## 2017-09-09 PROCEDURE — 74011000258 HC RX REV CODE- 258: Performed by: FAMILY MEDICINE

## 2017-09-09 PROCEDURE — 74011250637 HC RX REV CODE- 250/637: Performed by: FAMILY MEDICINE

## 2017-09-09 PROCEDURE — 65660000000 HC RM CCU STEPDOWN

## 2017-09-09 RX ORDER — DEXTROSE MONOHYDRATE AND SODIUM CHLORIDE 5; .45 G/100ML; G/100ML
75 INJECTION, SOLUTION INTRAVENOUS CONTINUOUS
Status: DISCONTINUED | OUTPATIENT
Start: 2017-09-09 | End: 2017-09-11

## 2017-09-09 RX ORDER — CEFTRIAXONE 1 G/1
1 INJECTION, POWDER, FOR SOLUTION INTRAMUSCULAR; INTRAVENOUS EVERY 24 HOURS
Status: DISCONTINUED | OUTPATIENT
Start: 2017-09-09 | End: 2017-09-09

## 2017-09-09 RX ORDER — RISPERIDONE 0.25 MG/1
0.25 TABLET, FILM COATED ORAL
Status: DISCONTINUED | OUTPATIENT
Start: 2017-09-09 | End: 2017-09-12 | Stop reason: HOSPADM

## 2017-09-09 RX ORDER — LEVOTHYROXINE SODIUM 25 UG/1
25 TABLET ORAL
Status: DISCONTINUED | OUTPATIENT
Start: 2017-09-10 | End: 2017-09-12 | Stop reason: HOSPADM

## 2017-09-09 RX ORDER — FAMOTIDINE 20 MG/1
20 TABLET, FILM COATED ORAL DAILY
Status: DISCONTINUED | OUTPATIENT
Start: 2017-09-10 | End: 2017-09-12 | Stop reason: HOSPADM

## 2017-09-09 RX ADMIN — CEFTRIAXONE 1 G: 1 INJECTION, POWDER, FOR SOLUTION INTRAMUSCULAR; INTRAVENOUS at 22:23

## 2017-09-09 RX ADMIN — DEXTROSE MONOHYDRATE AND SODIUM CHLORIDE 100 ML/HR: 5; .45 INJECTION, SOLUTION INTRAVENOUS at 17:48

## 2017-09-09 RX ADMIN — RISPERIDONE 0.25 MG: 0.25 TABLET ORAL at 22:24

## 2017-09-09 NOTE — ROUTINE PROCESS
Bedside and Verbal shift change report given to Minerva Prince RN (oncoming nurse) by Quinton Fountain RN   (offgoing nurse). Report included the following information SBAR, Kardex and Cardiac Rhythm Sinus Rythm.

## 2017-09-09 NOTE — H&P
History and Physical    Patient: Rocío Bautista MRN: [id number]  SSN: [id number]    YOB: 1917  Age: 80 y.o. Sex: female      Subjective:      Rocío Bautista is a 80 y.o. female who **is a resident of [facility]. Patient noted to be more confused than usual so sent to the ER. Patient found to have metabolic acidosis with UTI. Admit for UTI with acidosis. Past Medical History:   Diagnosis Date    Acute diastolic heart failure (HCC)     Arthritis     Chest pain, unspecified     Noncardiac likely    Hyperlipidemia     Mitral valve disorders     Nocturia     Pneumonia     Transfusion history     No history of receiving blood or blood product transfusion(s). 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 Authorizing Provider   bisacodyl (DULCOLAX) 10 mg suppository Insert 10 mg into rectum daily as needed. 5/22/17   Ann Marie Pompa MD   dicyclomine (BENTYL) 10 mg/5 mL soln oral solution Take 5 mL by mouth four (4) times daily. 5/22/17   Ann Marie Pompa MD   erythromycin base (E-MYCIN) 250 mg tablet Take 1 Tab by mouth four (4) times daily. 5/22/17   Ann Marie Pompa MD   famotidine (PEPCID) 20 mg tablet Take 1 Tab by mouth daily.  5/22/17   Ann Marie Pompa MD   HYDROcodone-acetaminophen (NORCO) 5-325 mg per tablet Take 1 Tab by mouth every four (4) hours as needed. Max Daily Amount: 6 Tabs. 5/22/17   Karthik Carter MD   levothyroxine (SYNTHROID) 25 mcg tablet Take 1 Tab by mouth Daily (before breakfast). 5/22/17   Karthik Carter MD   LORazepam (ATIVAN) 0.5 mg tablet Take 1 Tab by mouth three (3) times daily as needed. Max Daily Amount: 1.5 mg. 5/22/17   Karthik Carter MD   magnesium hydroxide (MILK OF MAGNESIA) 400 mg/5 mL suspension Take 30 mL by mouth daily as needed for Constipation. 5/22/17   Karthik Carter MD   ondansetron (ZOFRAN ODT) 4 mg disintegrating tablet Take 1 Tab by mouth every eight (8) hours as needed. 5/22/17   Karthik Carter MD   polyethylene glycol (MIRALAX) 17 gram packet Take 1 Packet by mouth daily. 5/22/17   Karthik Carter MD   risperiDONE (RISPERDAL) 0.25 mg tablet Take 1 Tab by mouth nightly. 5/22/17   Karthik Carter MD   nitroglycerin (NITROSTAT) 0.4 mg SL tablet 1 Tab by SubLINGual route every five (5) minutes as needed for Chest Pain for 3 doses. 11/6/13   Millicent Rosales MD        Allergies   Allergen Reactions    Ciprofloxacin Itching       Review of Systems:  Review of systems not obtained due to patient factors. Objective:     Vitals:    09/09/17 0112 09/09/17 0400 09/09/17 0916 09/09/17 1145   BP: 120/69 125/73 128/65 113/65   Pulse: 93 72 95 96   Resp: 22 20 20 18   Temp: 98.1 °F (36.7 °C) 98.1 °F (36.7 °C) 97 °F (36.1 °C) 97 °F (36.1 °C)   SpO2: 95% 97% 99% 99%   Weight:  47.3 kg (104 lb 4.4 oz)          Physical Exam:  GENERAL: uncooperative, no distress, appears older than stated age  LUNG: clear to auscultation bilaterally  HEART: regular rate and rhythm, S1, S2 normal, no murmur, click, rub or gallop  ABDOMEN: soft, non-tender.  Bowel sounds normal. No masses,  no organomegaly    Assessment:     Hospital Problems  Date Reviewed: 9/9/2017          Codes Class Noted POA    Dehydration ICD-10-CM: E86.0  ICD-9-CM: 276.51  9/8/2017 Unknown        Metabolic acidosis, normal anion gap (NAG) ICD-10-CM: E87.2  ICD-9-CM: 276.2  9/8/2017 Unknown        Hypernatremia ICD-10-CM: E87.0  ICD-9-CM: 276.0  9/8/2017 Unknown        Dementia ICD-10-CM: F03.90  ICD-9-CM: 294.20  5/22/2017 Unknown        UTI (urinary tract infection) ICD-10-CM: N39.0  ICD-9-CM: 599.0  5/16/2017 Unknown              Plan:     Admit IV hydration urine and blood cultures  Rocephin. Patient a DNR.     Signed By: Almita Gonzales MD     September 9, 2017

## 2017-09-09 NOTE — ROUTINE PROCESS
Bedside and Verbal shift change report given to Kristyn (oncoming nurse) by Jesi Tomas   (offgoing nurse). Report included the following information SBAR, Kardex and Cardiac Rhythm Sinus Rythm.

## 2017-09-09 NOTE — ROUTINE PROCESS
TRANSFER - OUT REPORT:    Verbal report given to Fabricio Aguirre RN (name) on Arun Chiang  being transferred to [address] [place] [address] ([facility]) for routine progression of care       Report consisted of patients Situation, Background, Assessment and   Recommendations(SBAR). Information from the following report(s) SBAR, ED Summary and MAR was reviewed with the receiving nurse. Lines:   Peripheral IV 09/08/17 Left Arm (Active)   Site Assessment Clean, dry, & intact 9/8/2017  8:00 PM   Phlebitis Assessment 0 9/8/2017  8:00 PM   Infiltration Assessment 0 9/8/2017  8:00 PM   Dressing Status Clean, dry, & intact 9/8/2017  8:00 PM   Dressing Type Transparent 9/8/2017  8:00 PM   Hub Color/Line Status Patent; Flushed 9/8/2017  8:00 PM   Action Taken Blood drawn 9/8/2017  8:00 PM        Opportunity for questions and clarification was provided.       Patient transported with:   Registered Nurse   Monitor

## 2017-09-09 NOTE — PROGRESS NOTES
Assumed care of patient. Received patient resting in bed with eyes close. Patient easily aroused. A&Ox1 (self). Respiration equal and unlabored. PIV left arm infusing D5NS at 100ml/hr without difficulty. Patient has no apparent distress, discomfort, or pain. Will continue to monitor.

## 2017-09-10 LAB
BACTERIA SPEC CULT: ABNORMAL
SERVICE CMNT-IMP: ABNORMAL

## 2017-09-10 PROCEDURE — 74011250637 HC RX REV CODE- 250/637: Performed by: FAMILY MEDICINE

## 2017-09-10 PROCEDURE — 65660000000 HC RM CCU STEPDOWN

## 2017-09-10 RX ORDER — SULFAMETHOXAZOLE AND TRIMETHOPRIM 400; 80 MG/1; MG/1
1 TABLET ORAL EVERY 12 HOURS
Status: DISCONTINUED | OUTPATIENT
Start: 2017-09-10 | End: 2017-09-12 | Stop reason: HOSPADM

## 2017-09-10 RX ORDER — SULFAMETHOXAZOLE AND TRIMETHOPRIM 400; 80 MG/1; MG/1
1 TABLET ORAL EVERY 12 HOURS
Status: DISCONTINUED | OUTPATIENT
Start: 2017-09-10 | End: 2017-09-10 | Stop reason: DRUGHIGH

## 2017-09-10 RX ORDER — GENTAMICIN SULFATE 80 MG/100ML
80 INJECTION, SOLUTION INTRAVENOUS ONCE
Status: DISPENSED | OUTPATIENT
Start: 2017-09-10 | End: 2017-09-11

## 2017-09-10 RX ADMIN — SULFAMETHOXAZOLE AND TRIMETHOPRIM 1 TABLET: 400; 80 TABLET ORAL at 21:17

## 2017-09-10 RX ADMIN — RISPERIDONE 0.25 MG: 0.25 TABLET ORAL at 21:17

## 2017-09-10 RX ADMIN — SULFAMETHOXAZOLE AND TRIMETHOPRIM 1 TABLET: 400; 80 TABLET ORAL at 17:31

## 2017-09-10 RX ADMIN — LEVOTHYROXINE SODIUM 25 MCG: 25 TABLET ORAL at 10:02

## 2017-09-10 RX ADMIN — FAMOTIDINE 20 MG: 20 TABLET ORAL at 09:00

## 2017-09-10 NOTE — PROGRESS NOTES
Patient pulled out IV in left wrist while receiving IVABT. Attempted to reinsert 24G in right forearm, patient started to be combative and swing her fists at [employer].

## 2017-09-10 NOTE — ROUTINE PROCESS
Received patient in bed, awake, alert and oriented to self, mumbles. . No complaints made, will continue to monitor. Report  Given by LEONEL Chambers RN.

## 2017-09-10 NOTE — PROGRESS NOTES
Progress Note    Patient: Ana Knowles MRN: [id number]  SSN: [id number]    YOB: 1917  Age: 80 y.o. Sex: female      Admit Date: 9/8/2017    LOS: 2 days     Subjective:     Patient admitted with lethargy with UTI. Has poor appetite    Objective:     Vitals:    09/10/17 0000 09/10/17 0400 09/10/17 0804 09/10/17 1238   BP: 122/69 129/78 127/73 121/69   Pulse: 92 100 96 92   Resp: 16 22 21 19   Temp: 97.9 °F (36.6 °C) 97.9 °F (36.6 °C) 97.3 °F (36.3 °C) 98 °F (36.7 °C)   SpO2: 100% 100% 98% 99%   Weight:       Height:            Intake and Output:  Current Shift:    Last three shifts: 09/08 1901 - 09/10 0700  In: 120 [P.O.:120]  Out: -     Physical Exam:   GENERAL: slowed mentation, appears stated age  LUNG: clear to auscultation bilaterally  HEART: regular rate and rhythm, S1, S2 normal, no murmur, click, rub or gallop    Lab/Data Review: All lab results for the last 24 hours reviewed. Urine culture proteus and MRSA    Assessment:     Active Problems:    UTI (urinary tract infection) (5/16/2017)      Dementia (5/22/2017)      Dehydration (5/6/0322)      Metabolic acidosis, normal anion gap (NAG) (9/8/2017)      Hypernatremia (9/8/2017)        Plan:     Change antibiotics to septra and gentamicin. Signed By: Yosef Franklin MD     September 10, 2017        Patient with metabolic encephalopathy from UTI. Diagnosed with UTI.

## 2017-09-11 LAB
ANION GAP SERPL CALC-SCNC: 8 MMOL/L (ref 3–18)
BUN SERPL-MCNC: 33 MG/DL (ref 7–18)
BUN/CREAT SERPL: 31 (ref 12–20)
CALCIUM SERPL-MCNC: 9.6 MG/DL (ref 8.5–10.1)
CHLORIDE SERPL-SCNC: 128 MMOL/L (ref 100–108)
CO2 SERPL-SCNC: 15 MMOL/L (ref 21–32)
CREAT SERPL-MCNC: 1.08 MG/DL (ref 0.6–1.3)
GLUCOSE SERPL-MCNC: 96 MG/DL (ref 74–99)
POTASSIUM SERPL-SCNC: 5 MMOL/L (ref 3.5–5.5)
SODIUM SERPL-SCNC: 151 MMOL/L (ref 136–145)

## 2017-09-11 PROCEDURE — 74011250637 HC RX REV CODE- 250/637: Performed by: FAMILY MEDICINE

## 2017-09-11 PROCEDURE — 74011250636 HC RX REV CODE- 250/636: Performed by: FAMILY MEDICINE

## 2017-09-11 PROCEDURE — 36415 COLL VENOUS BLD VENIPUNCTURE: CPT | Performed by: FAMILY MEDICINE

## 2017-09-11 PROCEDURE — 74011000258 HC RX REV CODE- 258: Performed by: FAMILY MEDICINE

## 2017-09-11 PROCEDURE — 80048 BASIC METABOLIC PNL TOTAL CA: CPT | Performed by: FAMILY MEDICINE

## 2017-09-11 PROCEDURE — 77030011256 HC DRSG MEPILEX <16IN NO BORD MOLN -A

## 2017-09-11 PROCEDURE — 65660000000 HC RM CCU STEPDOWN

## 2017-09-11 RX ORDER — DEXTROSE MONOHYDRATE 50 MG/ML
50 INJECTION, SOLUTION INTRAVENOUS CONTINUOUS
Status: DISCONTINUED | OUTPATIENT
Start: 2017-09-11 | End: 2017-09-12 | Stop reason: HOSPADM

## 2017-09-11 RX ADMIN — SULFAMETHOXAZOLE AND TRIMETHOPRIM 1 TABLET: 400; 80 TABLET ORAL at 09:34

## 2017-09-11 RX ADMIN — DEXTROSE MONOHYDRATE AND SODIUM CHLORIDE 75 ML/HR: 5; .45 INJECTION, SOLUTION INTRAVENOUS at 05:48

## 2017-09-11 RX ADMIN — DEXTROSE MONOHYDRATE 50 ML/HR: 5 INJECTION, SOLUTION INTRAVENOUS at 11:18

## 2017-09-11 RX ADMIN — SULFAMETHOXAZOLE AND TRIMETHOPRIM 1 TABLET: 400; 80 TABLET ORAL at 23:22

## 2017-09-11 RX ADMIN — LEVOTHYROXINE SODIUM 25 MCG: 25 TABLET ORAL at 09:34

## 2017-09-11 NOTE — PROGRESS NOTES
Progress Note    Patient: Ovi Ospina MRN: [id number]  CSN: [id number]    YOB: 1917  Age: 80 y.o. Sex: female    DOA: 9/8/2017 LOS:  LOS: 3 days                    Subjective:   Ovi Ospina is a 81 yo AA female who is a resident of a SNF in [place]. Per nursing staff there, pt has been having poor oral intake. Pt was admitted to the hospital on 9/9/17 for UTI with acidosis. Urine culture positive for proteus mirabilis and MRSA - has been on septra and gentamicin, but per ID gentamicin discontinued    CXR  No radiographic evidence of an acute cardiopulmonary process. No significant  interval change. Chief Complaint:   Chief Complaint   Patient presents with    Abnormal Lab Results       Review of systems: Unable to obtain from patient due to disorganized speech and thought process  Review of Systems   Constitutional: Negative for fever. Alert confused   Eyes: Negative for discharge. Respiratory: Negative for cough, sputum production, shortness of breath and wheezing. Cardiovascular: Negative for chest pain. Gastrointestinal: Negative for diarrhea and vomiting. Genitourinary:        Pt has female catheter   Skin: Negative for rash. Neurological: Positive for weakness. Objective:     Physical Exam:  Visit Vitals    /66 (BP 1 Location: Left arm, BP Patient Position: Lying right side)    Pulse (!) 106    Temp 97.6 °F (36.4 °C)    Resp 18    Ht 5' 1.02\" (1.55 m)    Wt 46.9 kg (103 lb 6.4 oz)    SpO2 99%    Breastfeeding No    BMI 19.52 kg/m2        General:         Alert but not oriented confused. Laying in bed playing with milk carton. Appears stated age  [de-identified]: NC, Atraumatic. PERRLA, anicteric sclerae. Lungs: CTA Bilaterally. No Wheezing/Rhonchi/Rales.   Heart:  Regular  rhythm,  No murmur, No Rubs, No Gallops  Abdomen: Soft, Non distended, Non tender.  +Bowel sounds, no HSM  Extremities: No lower limb edema radial and dorsalis pedis pulses 2+ bilaterally. Psych:   Poor insight. RASS +1. Speech is disorganized/tangiental. History of dementia  Neurologic:  Gross motor exam normal.    Intake and Output:  Current Shift:     Last three shifts:  09/09 1901 - 09/11 0700  In: 675 [I.V.:675]  Out: 800 [Urine:800]    Labs: Results:       Chemistry Recent Labs      09/11/17   0354  09/08/17   2104   GLU  96  133*   NA  151*  151*   K  5.0  4.0   CL  128*  129*   CO2  15*  11*   BUN  33*  70*   CREA  1.08  1.24   CA  9.6  8.8   AGAP  8  11   BUCR  31*  56*   AP   --   93   TP   --   8.1   ALB   --   2.7*   GLOB   --   5.4*   AGRAT   --   0.5*      CBC w/Diff Recent Labs      09/08/17 2026   WBC  12.5   RBC  5.26   HGB  15.9   HCT  47.3*   PLT  315   GRANS  54   LYMPH  33   EOS  1      Cardiac Enzymes No results for input(s): CPK, CKND1, PASCUAL in the last 72 hours. No lab exists for component: CKRMB, TROIP   Coagulation No results for input(s): PTP, INR, APTT in the last 72 hours. No lab exists for component: INREXT, INREXT    Lipid Panel Lab Results   Component Value Date/Time    Cholesterol, total 228 02/16/2017 03:25 PM    HDL Cholesterol 79 02/16/2017 03:25 PM    LDL, calculated 124 02/16/2017 03:25 PM    VLDL, calculated 29.8 06/27/2016 01:03 PM    Triglyceride 124 02/16/2017 03:25 PM    CHOL/HDL Ratio 3.0 06/27/2016 01:03 PM      BNP No results for input(s): BNPP in the last 72 hours.    Liver Enzymes Recent Labs      09/08/17 2104   TP  8.1   ALB  2.7*   AP  93   SGOT  16      Thyroid Studies Lab Results   Component Value Date/Time    TSH 0.90 09/08/2017 09:04 PM          Procedures/imaging: see electronic medical records for all procedures/Xrays and details which were not copied into this note but were reviewed prior to creation of Plan    Medications:   Current Facility-Administered Medications   Medication Dose Route Frequency    dextrose 5% infusion  50 mL/hr IntraVENous CONTINUOUS    trimethoprim-sulfamethoxazole (BACTRIM, SEPTRA)  mg per tablet 1 Tab  1 Tab Oral Q12H    levothyroxine (SYNTHROID) tablet 25 mcg  25 mcg Oral ACB    famotidine (PEPCID) tablet 20 mg  20 mg Oral DAILY    risperiDONE (RisperDAL) tablet 0.25 mg  0.25 mg Oral QHS       Assessment     Active Problems:    UTI (urinary tract infection) (5/16/2017)      Dementia (5/22/2017)      Dehydration (9/6/7619)      Metabolic acidosis, normal anion gap (NAG) (9/8/2017)      Hypernatremia (9/8/2017)      Plan:  UTI:   - culture positive for MRSA and proteus  - ID consult  - Continue septra; Per ID for 5 days gentamicin discontinued monitor kidney function    Dehydration/ Hypernatremia  - Continuous IV hydration change Iv fluid to D5 w at 50 cc   - Continue to monitor I/Os    Metabolic acidosis  - continue to monitor lab      Dementia  - Risperdal 0.25mg qHS    Moderate malnutrition  Nutritional consult    Hypothyroid  Continue synthroid  Recheck tsh and free t4    CBC, BMP, Mag in am  Discussed with medical power of  Mike Zhu [phone]  She agree for hospice care on discharge will get her hydrarted   And get nutritional consult before discharge. Pt is DNR      Chelsey Win MD  9/11/2017 9:36 AM

## 2017-09-11 NOTE — ROUTINE PROCESS
Bedside and Verbal shift change report given to [address] [place] [address] (oncoming nurse) by Bassem Patricia (offgoing nurse). Report included the following information SBAR, Kardex, MAR and Recent Results. SITUATION:    Code Status: DNR   Reason for Admission: UTI (urinary tract infection)   Hypernatremia   Dehydration   Metabolic acidosis, normal anion gap (NAG)   Dementia    [facility] day: 3   Problem List:       Hospital Problems  Date Reviewed: 9/9/2017          Codes Class Noted POA    Dehydration ICD-10-CM: E86.0  ICD-9-CM: 276.51  9/8/2017 Unknown        Metabolic acidosis, normal anion gap (NAG) ICD-10-CM: E87.2  ICD-9-CM: 276.2  9/8/2017 Unknown        Hypernatremia ICD-10-CM: E87.0  ICD-9-CM: 276.0  9/8/2017 Unknown        Dementia ICD-10-CM: F03.90  ICD-9-CM: 294.20  5/22/2017 Unknown        UTI (urinary tract infection) ICD-10-CM: N39.0  ICD-9-CM: 599.0  5/16/2017 Unknown              BACKGROUND:    Past Medical History:   Past Medical History:   Diagnosis Date    Acute diastolic heart failure (HCC)     Arthritis     Chest pain, unspecified     Noncardiac likely    Hyperlipidemia     Mitral valve disorders     Nocturia     Pneumonia     Transfusion history     No history of receiving blood or blood product transfusion(s).    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     Unspecified urinary incontinence     Urinary tract infection, site not specified          Patient taking anticoagulants no     ASSESSMENT:    Changes in Assessment Throughout Shift: none     Patient has Central Line: no Reasons if yes: na   Patient has Luong Cath: no Reasons if yes: na      Last Vitals:     Vitals:    09/10/17 1238 09/10/17 1559 09/10/17 2047 09/10/17 2350   BP: 121/69 134/68 138/84 114/73   Pulse: 92 84 (!) 101 99   Resp: 19 18 18 18   Temp: 98 °F (36.7 °C) 98.3 °F (36.8 °C) 97.7 °F (36.5 °C) 97.9 °F (36.6 °C)   SpO2: 99% 97% 99%    Weight: Height:            IV and DRAINS (will only show if present)   [REMOVED] Peripheral IV 09/08/17 Left Arm-Site Assessment: Clean, dry, & intact     WOUND (if present)   Wound Type:  none   Dressing present Dressing Present : No   Wound Concerns/Notes:  none     PAIN    Pain Assessment    Pain Intensity 1: 0 (09/11/17 0015)              Patient Stated Pain Goal: 0  o Interventions for Pain:  none  o Intervention effective: na  o Time of last intervention: na   o Reassessment Completed: yes      Last 3 Weights:  Last 3 Recorded Weights in this Encounter    09/09/17 0400   Weight: 47.3 kg (104 lb 4.4 oz)     Weight change:      INTAKE/OUPUT    Current Shift:      Last three shifts: 09/09 0701 - 09/10 1900  In: 120 [P.O.:120]  Out: -      LAB RESULTS     Recent Labs      09/08/17 2026   WBC  12.5   HGB  15.9   HCT  47.3*   PLT  315        Recent Labs      09/08/17 2104   NA  151*   K  4.0   GLU  133*   BUN  70*   CREA  1.24   CA  8.8   MG  2.7*       RECOMMENDATIONS AND DISCHARGE PLANNING     1. Pending tests/procedures/ Plan of Care or Other Needs: continue to monitor     2. Discharge plan for patient and Needs/Barriers: SNF    3. Estimated Discharge Date: pending Posted on Whiteboard in Patients Room: yes      4. The patient's care plan was reviewed with the oncoming nurse. \"HEALS\" SAFETY CHECK      Fall Risk    Total Score: 1    Safety Measures: Safety Measures: Bed/Chair-Wheels locked, Bed in low position, Call light within reach    A safety check occurred in the patient's room between off going nurse and oncoming nurse listed above.     The safety check included the below items  Area Items   H  High Alert Medications - Verify all high alert medication drips (heparin, PCA, etc.)   E  Equipment - Suction is set up for ALL patients (with yanker)  - Red plugs utilized for all equipment (IV pumps, etc.)  - WOWs wiped down at end of shift.  - Room stocked with oxygen, suction, and other unit-specific supplies   A  Alarms - Bed alarm is set for fall risk patients  - Ensure chair alarm is in place and activated if patient is up in a chair   L  Lines - Check IV for any infiltration  - Luong bag is empty if patient has a Luong   - Tubing and IV bags are labeled   S  Safety   - Room is clean, patient is clean, and equipment is clean. - Hallways are clear from equipment besides carts. - Fall bracelet on for fall risk patients  - Ensure room is clear and free of clutter  - Suction is set up for ALL patients (with fox)  - Hallways are clear from equipment besides carts.    - Isolation precautions followed, supplies available outside room, sign posted     [facility]

## 2017-09-11 NOTE — PROGRESS NOTES
Care Management Interventions  PCP Verified by CM: Yes (DR. Villa Reasons)  Palliative Care Consult (Criteria: CHF and RRAT>21): No  Reason for No Palliative Care Consult:  Other (see comment) (NO ORDER)  Mode of Transport at Discharge: BLS  Transition of Care Consult (CM Consult): Discharge Planning, LTAC  Current Support Network: Nursing Facility  Confirm Follow Up Transport: Other (see comment)  Plan discussed with Pt/Family/Caregiver: Yes (PT PLAN IS TO [address] [place], [place] [address]emily HANSEN)  [place] of Choice Offered: Yes (Alis Victor)  Discharge Location  Discharge Placement: Skilled nursing facility

## 2017-09-11 NOTE — PROGRESS NOTES
Problem: Falls - Risk of  Goal: *Absence of Falls  Document Lynn Fall Risk and appropriate interventions in the flowsheet.    Outcome: Progressing Towards Goal  Fall Risk Interventions:        Mentation Interventions: Adequate sleep, hydration, pain control, Bed/chair exit alarm, Door open when patient unattended     Medication Interventions: Bed/chair exit alarm, Evaluate medications/consider consulting pharmacy     Elimination Interventions: Bed/chair exit alarm, Call light in reach, Toileting schedule/hourly rounds

## 2017-09-11 NOTE — PROGRESS NOTES
NUTRITION    Nutrition Screen   Nutrition Consult: General Nutrition Management & Supplements  RECOMMENDATIONS / PLAN:     - Start Ensure Enlive TID with meals  - Start Ensure Pudding TID with meals.   - Continue RD inpatient monitoring and evaluation. NUTRITION INTERVENTIONS & DIAGNOSIS:     [x] Meals/Snacks: modified diet  [x] Medical food supplementation  [x] IV hydration       Nutrition Diagnosis: Unintentional weight loss related to limited PO intake as evidenced by 22 lb (18%) weight loss x 4 months. ASSESSMENT:     9/11: Pt sleeping-d/w RN-pt very confused. Did not eat breakfast, bites of pudding this AM.     Average po intake adequate to meet patients estimated nutritional needs:   [] Yes     [x] No   [] Unable to determine at this time    Diet: DIET DYSPHAGIA ADVANCED SOFT (NDD3)      Food Allergies: unknown  Current Appetite:   [] Good     [] Fair     [x] Poor     [] Other:  Appetite/meal intake prior to admission:   [] Good     [] Fair     [] Poor     [x] Other: unknown  Feeding Limitations:  [] Swallowing difficulty    [] Chewing difficulty    [x] Other: unknown-on dysphagia diet  Current Meal Intake: No data found. BM:  No BM documented this admission  Skin Integrity: no pressure injury noted  Edema: none noted  Pertinent Medications: Reviewed.  D5 at 50 ml/hr (60 gm dextrose, 204 kcal in 24 hours)    Recent Labs      09/11/17   0354  09/08/17   2104   NA  151*  151*   K  5.0  4.0   CL  128*  129*   CO2  15*  11*   GLU  96  133*   BUN  33*  70*   CREA  1.08  1.24   CA  9.6  8.8   MG   --   2.7*   ALB   --   2.7*   SGOT   --   16   ALT   --   21       Intake/Output Summary (Last 24 hours) at 09/11/17 1116  Last data filed at 09/11/17 1104   Gross per 24 hour   Intake          1011.25 ml   Output              800 ml   Net           211.25 ml       Anthropometrics:  Ht Readings from Last 1 Encounters:   09/09/17 5' 1.02\" (1.55 m)     Last 3 Recorded Weights in this Encounter    09/09/17 0400 09/11/17 0522   Weight: 47.3 kg (104 lb 4.4 oz) 46.9 kg (103 lb 6.4 oz)     Body mass index is 19.52 kg/(m^2). Weight History: Noted varying weights. Pt with weight loss of 22 lbs (18%) x 4 months. Weight Metrics 9/11/2017 8/22/2017 5/16/2017 12/2/2016 11/21/2016 8/28/2014 6/8/2014   Weight 103 lb 6.4 oz 220 lb 7.4 oz 125 lb 1.6 oz 116 lb 116 lb 128 lb 4.9 oz 129 lb   BMI 19.52 kg/m2 41.66 kg/m2 23.64 kg/m2 22.65 kg/m2 21.92 kg/m2 22.73 kg/m2 20.2 kg/m2        Admitting Diagnosis: UTI (urinary tract infection)  Hypernatremia  Dehydration  Metabolic acidosis, normal anion gap (NAG)  Dementia  Pertinent PMHx: dementia,    Education Needs:        [x] None identified  [] Identified - Not appropriate at this time  []  Identified and addressed - refer to education log  Learning Limitations:   [] None identified  [x] Identified: dementia-pt very confused    Cultural, Quaker & ethnic food preferences:  [x] None identified    [] Identified and addressed     ESTIMATED NUTRITION NEEDS:     Calories: 940-1017 kcal (MSJx1.2-1.3) based on  [x] Actual BW 47 kg      [] IBW   Protein: 38-56 gm (0.8-1.2 gm/kg) based on  [x] Actual BW      [] IBW   Fluid: 1 mL/kcal     MONITORING & EVALUATION:     Nutrition Goal(s):   1. Po intake of meals will meet >75% of patient estimated nutritional needs within the next 7 days.   Outcome:  [] Met/Ongoing    []  Not Met    [x] New/Initial Goal     Monitoring:   [x] Diet tolerance   [x] Meal intake   [x] Supplement intake   [] GI symptoms/ability to tolerate po diet   [] Respiratory status   [x] Plan of care      Previous Recommendations (for follow-up assessments only):     []   Implemented       []   Not Implemented (RD to address)     [] No Recommendation Made     Discharge Planning: regular diet, consistency as medically appropriate   [x] Participated in care planning, discharge planning, & interdisciplinary rounds as appropriate      Zeynep Dewey, [address] [place] [address]   Pager: [phone]

## 2017-09-11 NOTE — PROGRESS NOTES
Progress Note    Patient: Josef Zuluaga MRN: [id number]  CSN: [id number]    YOB: 1917  Age: 80 y.o. Sex: female    DOA: 9/8/2017 LOS:  LOS: 3 days                    Subjective:   Josef Zuluaga is a 79 yo AA female who is a resident of a SNF in [place]. Per nursing staff there, pt has been having poor oral intake. Pt was admitted to the hospital on 9/9/17 for UTI with acidosis. Urine culture positive for proteus mirabilis and MRSA - has been on septra and gentamicin, but per ID gentamicin discontinued    Chief Complaint:   Chief Complaint   Patient presents with    Abnormal Lab Results       Review of systems: Unable to obtain from patient due to disorganized speech and thought process      Objective:     Physical Exam:  Visit Vitals    /66 (BP 1 Location: Left arm, BP Patient Position: Lying right side)    Pulse (!) 106    Temp 97.6 °F (36.4 °C)    Resp 18    Ht 5' 1.02\" (1.55 m)    Wt 103 lb 6.4 oz (46.9 kg)    SpO2 99%    Breastfeeding No    BMI 19.52 kg/m2        General:         Alert but not oriented. Laying in bed playing with milk carton. Appears stated age  [de-identified]: NC, Atraumatic. PERRLA, anicteric sclerae. Lungs: CTA Bilaterally. No Wheezing/Rhonchi/Rales. Heart:  Regular  rhythm,  No murmur, No Rubs, No Gallops  Abdomen: Soft, Non distended, Non tender.  +Bowel sounds, no HSM  Extremities: No c/c/e; radial and dorsalis pedis pulses 2+ bilaterally.   Psych:   Poor insight. RASS +1. Speech is disorganized/tangiental. History of dementia  Neurologic:  Gross motor exam normal.    Intake and Output:  Current Shift:     Last three shifts:  09/09 1901 - 09/11 0700  In: 675 [I.V.:675]  Out: 800 [Urine:800]    Labs: Results:       Chemistry Recent Labs      09/11/17   0354  09/08/17   2104   GLU  96  133*   NA  151*  151*   K  5.0  4.0   CL  128*  129*   CO2  15*  11*   BUN  33*  70*   CREA  1.08  1.24   CA  9.6  8.8   AGAP  8  11   BUCR  31*  56*   AP   --   93   TP --   8.1   ALB   --   2.7*   GLOB   --   5.4*   AGRAT   --   0.5*      CBC w/Diff Recent Labs      09/08/17 2026   WBC  12.5   RBC  5.26   HGB  15.9   HCT  47.3*   PLT  315   GRANS  54   LYMPH  33   EOS  1      Cardiac Enzymes No results for input(s): CPK, CKND1, PASCUAL in the last 72 hours. No lab exists for component: CKRMB, TROIP   Coagulation No results for input(s): PTP, INR, APTT in the last 72 hours. No lab exists for component: INREXT    Lipid Panel Lab Results   Component Value Date/Time    Cholesterol, total 228 02/16/2017 03:25 PM    HDL Cholesterol 79 02/16/2017 03:25 PM    LDL, calculated 124 02/16/2017 03:25 PM    VLDL, calculated 29.8 06/27/2016 01:03 PM    Triglyceride 124 02/16/2017 03:25 PM    CHOL/HDL Ratio 3.0 06/27/2016 01:03 PM      BNP No results for input(s): BNPP in the last 72 hours.    Liver Enzymes Recent Labs      09/08/17 2104   TP  8.1   ALB  2.7*   AP  93   SGOT  16      Thyroid Studies Lab Results   Component Value Date/Time    TSH 0.90 09/08/2017 09:04 PM          Procedures/imaging: see electronic medical records for all procedures/Xrays and details which were not copied into this note but were reviewed prior to creation of Plan    Medications:   Current Facility-Administered Medications   Medication Dose Route Frequency    trimethoprim-sulfamethoxazole (BACTRIM, SEPTRA)  mg per tablet 1 Tab  1 Tab Oral Q12H    levothyroxine (SYNTHROID) tablet 25 mcg  25 mcg Oral ACB    famotidine (PEPCID) tablet 20 mg  20 mg Oral DAILY    risperiDONE (RisperDAL) tablet 0.25 mg  0.25 mg Oral QHS    dextrose 5 % - 0.45% NaCl infusion  75 mL/hr IntraVENous CONTINUOUS       Assessment     Active Problems:    UTI (urinary tract infection) (5/16/2017)      Dementia (5/22/2017)      Dehydration (8/1/3037)      Metabolic acidosis, normal anion gap (NAG) (9/8/2017)      Hypernatremia (9/8/2017)      Plan:  UTI:   - culture positive for MRSA and proteus  - Continue septra; Per ID, gentamicin discontinued    Dehydration:  - Continuous IV hydration  - Continue to monitor I/Os    Hypernatremia:  - Na+ 151  - continue IV hydration    Metabolic acidosis  - repeat ABG    Dementia  - Risperdal 0.25mg qHS      CBC, BMP, Mag in am    Will discuss with family about hospice. Patient is currently DNR.     Carla Ibarra  9/11/2017 9:36 AM

## 2017-09-11 NOTE — CDMP QUERY
Please clarify if this patient is being treated/managed for:    => Sepsis associated with UTI  =>Other Explanation of clinical findings  =>Unable to Determine (no explanation of clinical findings)    The medical record reflects the following:    Risk:89 y.o. female who is a resident of [facility]. Patient noted to be more confused than usual so sent to the ER. Patient found to have metabolic acidosis with UTI. Admit for UTI with acidosis    Clinical Indicators:tachycardia ,lactic acidosis,increased confusion,bandemia,    Treatment: IV abx    Please clarify and document your clinical opinion in the progress notes and discharge summary including the definitive and/or presumptive diagnosis, (suspected or probable), related to the above clinical findings. Please include clinical findings supporting your diagnosis. If you DECLINE this query or would like to communicate with General Blood, please utilize the \"General Blood message box\" at the TOP of the Progress Note on the right.       Thank you,

## 2017-09-11 NOTE — CONSULTS
Infectious Disease Consultation Note    Requested by: dr. Lizet Garrison    Reason: cystitis    Current abx Prior abx   trimeth-sulfa, gentamicin since 9/10/17 Ceftriaxone 9/8-9/10     Lines:       Assessment :    80 y.o. female with pertinent PMHx of dementia, CHF, and HLD, sent to ed  from [address] on 9/8/17 with abnormal lab values, altered mental status. Clinical picture consistent with mrsa/proteus cystitis in a bedbound patient. Patient is clinically improving on current therapy  There a plans to switch her to outpatient hospice and hence, I would recommend to complete treatment with oral antibiotics    Recommendations:    1. Continue po bactrim till 9/14/17 (last dose 9/14 pm). Switch to liquid formulation to enhance compliance  2. Monitor creatinine while on bactrim. May have to discontinue it earlier if worsening creatinine. Advance Care planning: patient wasnt able to name a surrogate decision maker or provide an advance care plan    Thank you for consultation request. Above plan was discussed in details with rn and dr Lizet Garrison. Please call me if any further questions or concerns. Will continue to participate in the care of this patient. HPI:    80 y.o. female with pertinent PMHx of dementia, CHF, and HLD, sent to ed  from [address] on 9/8/17 with abnormal lab values, altered mental status. Per nursing home, pt has been \"slightly\" more altered than her baseline, but has baseline dementia and is only A+Ox1 at baseline. Metabolic panel in ed showed CO2 earlier at 6 which subsequently decreased to 8. Her UA revealed significant pyuria. She was initially started on ceftriaxone. Her blood cultures returned negative. Urine cultures revealed >100,000 proteus, MRSA. Her abx switched to bactrim, gentamicin on 9/10. I have been consulted for further recommendations. Patient's wbc count on 9/8 was 12.5.      Detailed ros not feasible since patient unable to communicate effectively. Past Medical History:   Diagnosis Date    Acute diastolic heart failure (HCC)     Arthritis     Chest pain, unspecified     Noncardiac likely    Hyperlipidemia     Mitral valve disorders     Nocturia     Pneumonia     Transfusion history     No history of receiving blood or blood product transfusion(s). 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     Unspecified urinary incontinence     Urinary tract infection, site not specified        Past Surgical History:   Procedure Laterality Date    ABDOMEN SURGERY PROC UNLISTED      intestines, stricture    HX CHOLECYSTECTOMY      HX HEMORRHOIDECTOMY         home Medication List    Details   bisacodyl (DULCOLAX) 10 mg suppository Insert 10 mg into rectum daily as needed. Qty: 30 Suppository, Refills: 0      dicyclomine (BENTYL) 10 mg/5 mL soln oral solution Take 5 mL by mouth four (4) times daily. Qty: 473 mL, Refills: 0      erythromycin base (E-MYCIN) 250 mg tablet Take 1 Tab by mouth four (4) times daily. Qty: 90 Tab, Refills: 0      famotidine (PEPCID) 20 mg tablet Take 1 Tab by mouth daily. Qty: 30 Tab, Refills: 0      HYDROcodone-acetaminophen (NORCO) 5-325 mg per tablet Take 1 Tab by mouth every four (4) hours as needed. Max Daily Amount: 6 Tabs. Qty: 40 Tab, Refills: 0      levothyroxine (SYNTHROID) 25 mcg tablet Take 1 Tab by mouth Daily (before breakfast). Qty: 30 Tab, Refills: 0      LORazepam (ATIVAN) 0.5 mg tablet Take 1 Tab by mouth three (3) times daily as needed. Max Daily Amount: 1.5 mg.  Qty: 90 Tab, Refills: 0      magnesium hydroxide (MILK OF MAGNESIA) 400 mg/5 mL suspension Take 30 mL by mouth daily as needed for Constipation. Qty: 1 Bottle, Refills: 0      ondansetron (ZOFRAN ODT) 4 mg disintegrating tablet Take 1 Tab by mouth every eight (8) hours as needed.   Qty: 90 Tab, Refills: 0      polyethylene glycol (MIRALAX) 17 gram packet Take 1 Packet by mouth daily. Qty: 30 Packet, Refills: 0      risperiDONE (RISPERDAL) 0.25 mg tablet Take 1 Tab by mouth nightly. Qty: 30 Tab, Refills: 0      nitroglycerin (NITROSTAT) 0.4 mg SL tablet 1 Tab by SubLINGual route every five (5) minutes as needed for Chest Pain for 3 doses. Qty: 25 Tab, Refills: 0             Current Facility-Administered Medications   Medication Dose Route Frequency    trimethoprim-sulfamethoxazole (BACTRIM, SEPTRA)  mg per tablet 1 Tab  1 Tab Oral Q12H    levothyroxine (SYNTHROID) tablet 25 mcg  25 mcg Oral ACB    famotidine (PEPCID) tablet 20 mg  20 mg Oral DAILY    risperiDONE (RisperDAL) tablet 0.25 mg  0.25 mg Oral QHS    dextrose 5 % - 0.45% NaCl infusion  75 mL/hr IntraVENous CONTINUOUS       Allergies: Ciprofloxacin    Family History   Problem Relation Age of Onset    Heart Attack Father 72    Sudden Death Neg Hx      Social History     Social History    Marital status:      Spouse name: N/A    Number of children: N/A    Years of education: N/A     Occupational History    Not on file. Social History Main Topics    Smoking status: Former Smoker     Quit date: 10/18/2002    Smokeless tobacco: Never Used    Alcohol use No    Drug use: No    Sexual activity: Not Currently     Other Topics Concern    Not on file     Social History Narrative     History   Smoking Status    Former Smoker    Quit date: 10/18/2002   Smokeless Tobacco    Never Used        Temp (24hrs), Av.9 °F (36.6 °C), Min:97.6 °F (36.4 °C), Max:98.3 °F (36.8 °C)    Visit Vitals    /66 (BP 1 Location: Left arm, BP Patient Position: Lying right side)    Pulse (!) 106    Temp 97.6 °F (36.4 °C)    Resp 18    Ht 5' 1.02\" (1.55 m)    Wt 46.9 kg (103 lb 6.4 oz)    SpO2 99%    Breastfeeding No    BMI 19.52 kg/m2       ROS: Detailed ros not feasible since patient unable to communicate effectively. Physical Exam:    Constitutional: No distress.    Thin and frail   HENT:   Head: Normocephalic and atraumatic. Mouth/Throat: Oropharynx is clear and moist. Mucous membranes are dry  Eyes: Conjunctivae and EOM are normal. No scleral icterus. Left pupil is round. Right corneal opacity, left pupil is irregular   Neck: Trachea normal and normal range of motion. Neck supple. No JVD present. No thyromegaly present. Cardiovascular: Normal rate, regular rhythm, S1 normal and S2 normal.  Exam reveals no gallop and no friction rub. No murmur heard. Pulmonary/Chest: Effort normal and breath sounds normal. No accessory muscle usage. No respiratory distress. Abdominal: Soft. Normal appearance. She exhibits no distension. There is no tenderness. There is no rigidity, no rebound and no guarding. Musculoskeletal: Normal range of motion. She exhibits no edema or tenderness. Neurological: She is alert. She has normal strength. No cranial nerve deficit or sensory deficit. Coordination normal.   Oriented to person only, cooperative, moving all extremities spontaneously   Skin: Skin is warm and intact. No rash noted. Psychiatric: She has a normal mood and affect. Vitals reviewed.     Labs: Results:   Chemistry Recent Labs      09/11/17   0354  09/08/17   2104   GLU  96  133*   NA  151*  151*   K  5.0  4.0   CL  128*  129*   CO2  15*  11*   BUN  33*  70*   CREA  1.08  1.24   CA  9.6  8.8   AGAP  8  11   BUCR  31*  56*   AP   --   93   TP   --   8.1   ALB   --   2.7*   GLOB   --   5.4*   AGRAT   --   0.5*      CBC w/Diff Recent Labs      09/08/17 2026   WBC  12.5   RBC  5.26   HGB  15.9   HCT  47.3*   PLT  315   GRANS  54   LYMPH  33   EOS  1      Microbiology Recent Labs      09/08/17   2045 09/08/17 2026 09/08/17 2010   CULT  >100,000 COLONIES/mL PROTEUS MIRABILIS*  >100,000 COLONIES/mL **METHICILLIN RESISTANT STAPHYLOCOCCUS AUREUS  MRSA CALLED TO AND CORRECTLY REPEATED BY:  CHRISSY JONES, RN 2S ON 9/10 AT 0958 TO [facility]    NO GROWTH 3 DAYS  NO GROWTH 3 DAYS          RADIOLOGY:    All available imaging studies/reports in [facility] for this admission were reviewed    Dr. Rocío Horne, Infectious Disease Specialist  [phone]  September 11, 2017  8:41 AM

## 2017-09-11 NOTE — PROGRESS NOTES
0800 Assumed care of patient. Pt lying in bed with eyes closed. Pt confused. Answers to name. Unable to answer questions. 1500 Pt with poor PO intake this shift. Pt refusing to eat food on tray. Asked family to bring in food patient enjoys as she is going home for hospice care. 1520 Bedside shift change report given to Charlette Rose RN (oncoming nurse) by Mike Riley RN   (offgoing nurse). Report included the following information SBAR, Kardex and MAR.

## 2017-09-12 ENCOUNTER — TELEPHONE (OUTPATIENT)
Dept: FAMILY MEDICINE CLINIC | Age: 82
End: 2017-09-12

## 2017-09-12 VITALS
WEIGHT: 104 LBS | SYSTOLIC BLOOD PRESSURE: 115 MMHG | BODY MASS INDEX: 19.63 KG/M2 | OXYGEN SATURATION: 98 % | HEIGHT: 61 IN | TEMPERATURE: 97.8 F | HEART RATE: 99 BPM | DIASTOLIC BLOOD PRESSURE: 64 MMHG | RESPIRATION RATE: 16 BRPM

## 2017-09-12 PROBLEM — G93.41 ACUTE METABOLIC ENCEPHALOPATHY: Status: ACTIVE | Noted: 2017-09-12

## 2017-09-12 PROBLEM — N39.0 SEPSIS SECONDARY TO UTI (HCC): Status: RESOLVED | Noted: 2017-09-12 | Resolved: 2017-09-12

## 2017-09-12 PROBLEM — A41.9 SEPSIS SECONDARY TO UTI (HCC): Status: ACTIVE | Noted: 2017-09-12

## 2017-09-12 PROBLEM — A41.9 SEPSIS SECONDARY TO UTI (HCC): Status: RESOLVED | Noted: 2017-09-12 | Resolved: 2017-09-12

## 2017-09-12 PROBLEM — E86.0 DEHYDRATION: Status: RESOLVED | Noted: 2017-09-08 | Resolved: 2017-09-12

## 2017-09-12 PROBLEM — N39.0 SEPSIS SECONDARY TO UTI (HCC): Status: ACTIVE | Noted: 2017-09-12

## 2017-09-12 PROCEDURE — 74011250637 HC RX REV CODE- 250/637: Performed by: FAMILY MEDICINE

## 2017-09-12 PROCEDURE — 74011250636 HC RX REV CODE- 250/636: Performed by: FAMILY MEDICINE

## 2017-09-12 RX ORDER — SULFAMETHOXAZOLE AND TRIMETHOPRIM 400; 80 MG/1; MG/1
1 TABLET ORAL EVERY 12 HOURS
Qty: 5 TAB | Refills: 0 | Status: SHIPPED | OUTPATIENT
Start: 2017-09-12 | End: 2017-09-15

## 2017-09-12 RX ADMIN — DEXTROSE MONOHYDRATE 50 ML/HR: 5 INJECTION, SOLUTION INTRAVENOUS at 07:31

## 2017-09-12 RX ADMIN — SULFAMETHOXAZOLE AND TRIMETHOPRIM 1 TABLET: 400; 80 TABLET ORAL at 09:10

## 2017-09-12 RX ADMIN — LEVOTHYROXINE SODIUM 25 MCG: 25 TABLET ORAL at 09:10

## 2017-09-12 RX ADMIN — FAMOTIDINE 20 MG: 20 TABLET ORAL at 09:10

## 2017-09-12 NOTE — PROGRESS NOTES
Patient being discharged to [facility] skilled nursing facility. Report called and given to Jo Rodgers. IV and tele box removed. Patient scheduled for pickup with medical transport . Discharge instructions, summary, prescription, and code status given in packet.

## 2017-09-12 NOTE — DISCHARGE SUMMARY
Discharge Summary        Patient ID:        Rigo Rayo        [id number]        38 y.o.        11/9/1917        Admission Date: 9/8/2017      Discharge date and time: 9/12/2017        Inpatient care:   Problem that led to hospitalization: Active Problems:    UTI (urinary tract infection) (5/16/2017)      Dementia (7/12/2301)      Metabolic acidosis, normal anion gap (NAG) (9/8/2017)      Hypernatremia (9/8/2017)      Acute metabolic encephalopathy (1/59/2091)    Portable CXR 9/8/17  Impression:     No radiographic evidence of an acute cardiopulmonary process. No significant  interval change. 9/8/17 blood cultures x2 negative for growth  9/8/17 urine culture >100k proteus and >100k MRSA  9/8/17 Na 151, Co2 11, Cr 1.24  9/11/17 Na 151, Co2 15, Cr 1.08                Consults:ID    Procedures:             Final diagnoses (primary and secondary): <principal problem not specified>                                            Active Problems:    UTI (urinary tract infection) (5/16/2017)      Dementia (9/41/9878)      Metabolic acidosis, normal anion gap (NAG) (9/8/2017)      Hypernatremia (9/8/2017)      Acute metabolic encephalopathy (8/13/8433)            Brief hospital course  Rigo Rayo is a 81 yo AA female who is a resident of a SNF in [place] with PMHx of dementia, CHF, and HLD, sent to ed  from [address] on 9/8/17 with abnormal lab values, altered mental status/acute metabolic encephalopathy. Per nursing home, pt has been \"slightly\" more altered than her baseline, but has baseline dementia and is only A+Ox1 at baseline. Metabolic panel in ed showed CO2 earlier at 6 which subsequently decreased to 8. Her UA revealed significant pyuria. She was admitted for sepsis from UTI and initially started on ceftriaxone. Her blood cultures returned negative. Urine cultures revealed >100,000 proteus, MRSA. Her abx switched to bactrim, gentamicin on 9/10.   ID consult Recommended bactrim PO until 9/14/17 (last dose on 9/14/17 pm) and to monitor creatinine while on medication. She was given D5w for her dehydration and nutrion consulted recommended ensure enlive tid with meals and ensure pudding tid with meals. We attempted to recheck labs prior to discharge and patient refused. She was continued on risperdal for her dementia, synthroid for her hypothyroidism. Further discussions with her Mig[phone] [phone]) of patient condition and poor prognosis led to care plan to discharge to hospice care. Discharge Exam:   Visit Vitals    /64 (BP 1 Location: Left arm, BP Patient Position: Lying right side)    Pulse 99    Temp 97.8 °F (36.6 °C)    Resp 16    Ht 5' 1.02\" (1.55 m)    Wt 47.2 kg (104 lb)    SpO2 98%    Breastfeeding No    BMI 19.64 kg/m2     General:  Alert, no distress, appears stated age. Head:  Normocephalic, without obvious abnormality, atraumatic. Eyes:  Conjunctivae clear   Nose: Nares normal. Septum midline. Mucosa normal. No drainage or sinus tenderness. Throat: Lips, mucosa, and tongue normal.   Neck: Supple, symmetrical, trachea midline, no adenopathy, thyroid: no enlargement/tenderness/nodules, no carotid bruit and no JVD. Back:   No CVA tenderness. Lungs:   Clear to auscultation bilaterally. Chest wall:  No tenderness or deformity. Heart:  Regular rate and rhythm, S1, S2 normal, no murmur, click, rub or gallop. Abdomen:   Soft, non-tender. Bowel sounds normal. No masses,  No organomegaly. Extremities: Extremities normal, atraumatic, no cyanosis or edema. Pulses: 2+ and symmetric all extremities. Skin: No rashes or lesions. Lymph nodes: Cervical, supraclavicular, and axillary nodes normal.   Neurologic: CNII-XII intact.  4/5 strength throughout, Oriented to self only, unable to follow simple instructions or answer questions appropriately but does talk during exam.              Postdischarge care/patient self-management Medications at discharge  including reasons for change and indications for new ones:   Current Discharge Medication List      START taking these medications    Details   trimethoprim-sulfamethoxazole (BACTRIM, SEPTRA)  mg per tablet Take 1 Tab by mouth every twelve (12) hours for 3 days. Qty: 5 Tab, Refills: 0         CONTINUE these medications which have NOT CHANGED    Details   bisacodyl (DULCOLAX) 10 mg suppository Insert 10 mg into rectum daily as needed. Qty: 30 Suppository, Refills: 0      erythromycin base (E-MYCIN) 250 mg tablet Take 1 Tab by mouth four (4) times daily. Qty: 90 Tab, Refills: 0      famotidine (PEPCID) 20 mg tablet Take 1 Tab by mouth daily. Qty: 30 Tab, Refills: 0      HYDROcodone-acetaminophen (NORCO) 5-325 mg per tablet Take 1 Tab by mouth every four (4) hours as needed. Max Daily Amount: 6 Tabs. Qty: 40 Tab, Refills: 0      levothyroxine (SYNTHROID) 25 mcg tablet Take 1 Tab by mouth Daily (before breakfast). Qty: 30 Tab, Refills: 0      LORazepam (ATIVAN) 0.5 mg tablet Take 1 Tab by mouth three (3) times daily as needed. Max Daily Amount: 1.5 mg.  Qty: 90 Tab, Refills: 0      magnesium hydroxide (MILK OF MAGNESIA) 400 mg/5 mL suspension Take 30 mL by mouth daily as needed for Constipation. Qty: 1 Bottle, Refills: 0      ondansetron (ZOFRAN ODT) 4 mg disintegrating tablet Take 1 Tab by mouth every eight (8) hours as needed. Qty: 90 Tab, Refills: 0      polyethylene glycol (MIRALAX) 17 gram packet Take 1 Packet by mouth daily. Qty: 30 Packet, Refills: 0      risperiDONE (RISPERDAL) 0.25 mg tablet Take 1 Tab by mouth nightly. Qty: 30 Tab, Refills: 0      nitroglycerin (NITROSTAT) 0.4 mg SL tablet 1 Tab by SubLINGual route every five (5) minutes as needed for Chest Pain for 3 doses.   Qty: 25 Tab, Refills: 0         STOP taking these medications       dicyclomine (BENTYL) 10 mg/5 mL soln oral solution Comments:   Reason for Stopping:                   Pending laboratory work and tests: Blood culture not finalized, however no growth x4 days in 2 bottles        Condition at discharge and functional status: improved and fair        Diet at discharge Comfort feeding, Resume previous diet and Ensure Enlive TID with meals, Ensure Pudding TID with meals        Activities at discharge: PT/OT eval and treat for deconditioning        Discharge destination: [address]        Patients cognitive function on discharge is awake, alert, confused. Oriented to person, does not follow instruction or answer questions appropriately, agitated with exams. Follow up labs:  ID consult recommending monitoring of kidney function while on bactrim. Recheck BMP in 2 days for monitoring of kidney function only. Advanced care plan    DNR, Hospice Care    Contact information/plan for follow up care      [phone]  Dr. Dennise Corcoran or Dr. Lizzy Angeles with [facility] in 1 day.               Recommendations of subspecialty consultants: ID

## 2017-09-12 NOTE — DISCHARGE INSTRUCTIONS
Urinary Tract Infection in Women: Care Instructions  Your Care Instructions    A urinary tract infection, or UTI, is a general term for an infection anywhere between the kidneys and the urethra (where urine comes out). Most UTIs are bladder infections. They often cause pain or burning when you urinate. UTIs are caused by bacteria and can be cured with antibiotics. Be sure to complete your treatment so that the infection goes away. Follow-up care is a key part of your treatment and safety. Be sure to make and go to all appointments, and call your doctor if you are having problems. It's also a good idea to know your test results and keep a list of the medicines you take. How can you care for yourself at home? · Take your antibiotics as directed. Do not stop taking them just because you feel better. You need to take the full course of antibiotics. · Drink extra water and other fluids for the next day or two. This may help wash out the bacteria that are causing the infection. (If you have kidney, heart, or liver disease and have to limit fluids, talk with your doctor before you increase your fluid intake.)  · Avoid drinks that are carbonated or have caffeine. They can irritate the bladder. · Urinate often. Try to empty your bladder each time. · To relieve pain, take a hot bath or lay a heating pad set on low over your lower belly or genital area. Never go to sleep with a heating pad in place. To prevent UTIs  · Drink plenty of water each day. This helps you urinate often, which clears bacteria from your system. (If you have kidney, heart, or liver disease and have to limit fluids, talk with your doctor before you increase your fluid intake.)  · Urinate when you need to. · Urinate right after you have sex. · Change sanitary pads often. · Avoid douches, bubble baths, feminine hygiene sprays, and other feminine hygiene products that have deodorants.   · After going to the bathroom, wipe from front to back.  When should you call for help? Call your doctor now or seek immediate medical care if:  · Symptoms such as fever, chills, nausea, or vomiting get worse or appear for the first time. · You have new pain in your back just below your rib cage. This is called flank pain. · There is new blood or pus in your urine. · You have any problems with your antibiotic medicine. Watch closely for changes in your health, and be sure to contact your doctor if:  · You are not getting better after taking an antibiotic for 2 days. · Your symptoms go away but then come back. Where can you learn more? Go to http://abner-gabino.info/. Enter D043 in the search box to learn more about \"Urinary Tract Infection in Women: Care Instructions. \"  Current as of: November 28, 2016  Content Version: 11.3  © 5773-1610 [employer]. Care instructions adapted under license by [employer] (which disclaims liability or warranty for this information). If you have questions about a medical condition or this instruction, always ask your healthcare professional. [place] [address] any warranty or liability for your use of this information. Patient armband removed and shredded  [employer] Activation    Thank you for requesting access to [employer]. Please follow the instructions below to securely access and download your online medical record. [employer] allows you to send messages to your doctor, view your test results, renew your prescriptions, schedule appointments, and more. How Do I Sign Up? 1. In your internet browser, go to [email or web address]  2. Click on the First Time User? Click Here link in the Sign In box. You will be redirect to the New Member Sign Up page. 3. Enter your [employer] Access Code exactly as it appears below. You will not need to use this code after youve completed the sign-up process.  If you do not sign up before the expiration date, you must request a new code.    [employer] Access Code: Activation code not generated  Current [employer] Status: Patient Declined (This is the date your [employer] access code will )    4. Enter the last four digits of your Social Security Number (xxxx) and Date of Birth (mm/dd/yyyy) as indicated and click Submit. You will be taken to the next sign-up page. 5. Create a [employer] ID. This will be your [employer] login ID and cannot be changed, so think of one that is secure and easy to remember. 6. Create a [employer] password. You can change your password at any time. 7. Enter your Password Reset Question and Answer. This can be used at a later time if you forget your password. 8. Enter your e-mail address. You will receive e-mail notification when new information is available in [address] 9. Click Sign Up. You can now view and download portions of your medical record. 10. Click the Download Summary menu link to download a portable copy of your medical information. Additional Information    If you have questions, please visit the Frequently Asked Questions section of the [employer] website at [email or web address] Remember, [employer] is NOT to be used for urgent needs. For medical emergencies, dial 911. DISCHARGE SUMMARY from Nurse    The following personal items are in your possession at time of discharge:    Dental Appliances: Lowers, Uppers, Sent home  Visual Aid: None     Home Medications: None  Jewelry: None  Clothing: None  Other Valuables: None  Personal Items Sent to Safe: None          PATIENT INSTRUCTIONS:    After general anesthesia or intravenous sedation, for 24 hours or while taking prescription Narcotics:  · Limit your activities  · Do not drive and operate hazardous machinery  · Do not make important personal or business decisions  · Do  not drink alcoholic beverages  · If you have not urinated within 8 hours after discharge, please contact your surgeon on call.     Report the following to your surgeon:  · Excessive pain, swelling, redness or odor of or around the surgical area  · Temperature over 100.5  · Nausea and vomiting lasting longer than 4 hours or if unable to take medications  · Any signs of decreased circulation or nerve impairment to extremity: change in color, persistent  numbness, tingling, coldness or increase pain  · Any questions        What to do at Home:  Recommended activity: Activity as tolerated    If you experience any of the following symptoms pain during urination, blood in urine, frequent urination, unable to urinate, fever greater than 100.5, nausea, vomiting, diarrhea, constipation, chest pains, short of breath please follow up with PCP. *  Please give a list of your current medications to your Primary Care Provider. *  Please update this list whenever your medications are discontinued, doses are      changed, or new medications (including over-the-counter products) are added. *  Please carry medication information at all times in case of emergency situations. These are general instructions for a healthy lifestyle:    No smoking/ No tobacco products/ Avoid exposure to second hand smoke    Surgeon General's Warning:  Quitting smoking now greatly reduces serious risk to your health. Obesity, smoking, and sedentary lifestyle greatly increases your risk for illness    A healthy diet, regular physical exercise & weight monitoring are important for maintaining a healthy lifestyle    You may be retaining fluid if you have a history of heart failure or if you experience any of the following symptoms:  Weight gain of 3 pounds or more overnight or 5 pounds in a week, increased swelling in our hands or feet or shortness of breath while lying flat in bed. Please call your doctor as soon as you notice any of these symptoms; do not wait until your next office visit.     Recognize signs and symptoms of STROKE:    F-face looks uneven    A-arms unable to move or move unevenly    S-speech slurred or non-existent    T-time-call 911 as soon as signs and symptoms begin-DO NOT go       Back to bed or wait to see if you get better-TIME IS BRAIN. Warning Signs of HEART ATTACK     Call 911 if you have these symptoms:   Chest discomfort. Most heart attacks involve discomfort in the center of the chest that lasts more than a few minutes, or that goes away and comes back. It can feel like uncomfortable pressure, squeezing, fullness, or pain.  Discomfort in other areas of the upper body. Symptoms can include pain or discomfort in one or both arms, the back, neck, jaw, or stomach.  Shortness of breath with or without chest discomfort.  Other signs may include breaking out in a cold sweat, nausea, or lightheadedness. Don't wait more than five minutes to call 911 - MINUTES MATTER! Fast action can save your life. Calling 911 is almost always the fastest way to get lifesaving treatment. Emergency Medical Services staff can begin treatment when they arrive -- up to an hour sooner than if someone gets to the hospital by car. The discharge information has been reviewed with the patient. The patient verbalized understanding. Discharge medications reviewed with the patient and appropriate educational materials and side effects teaching were provided. Patient Discharge Instructions    Dave Johnstevin / [id number] : 1917    Admitted 2017 Discharged: 2017     · It is important that you take the medication exactly as they are prescribed. · Keep your medication in the bottles provided by the pharmacist and keep a list of the medication names, dosages, and times to be taken with you at all times. · Do not take other medications without consulting your doctor.        Recommended Diet: Comfort feeding, Resume previous diet and Ensure enlive and Ensure Pudding TID with meals    Recommended Activity: PT/OT Eval and Treat    Discharge to comfort care, consult hospice. Follow up with PCM for fevers, chest pain, cough, shortness of breath.     Signed By: Chema Nicolas MD     September 12, 2017

## 2017-09-12 NOTE — PROGRESS NOTES
This writer has placed this pt in e-discharge for return to [place] and this writer has called both daughters and has not been able to reach them.

## 2017-09-12 NOTE — CDMP QUERY
Please clarify if this patient is being treated/managed for:    => Acute Metabolic Encephalopathy   =>Other Explanation of clinical findings  =>Unable to Determine (no explanation of clinical findings)    The medical record reflects the following:    Risk:89 y.o. female who **is a resident of [facility]. Patient noted to be more confused than usual so sent to the ER. Patient found to have metabolic acidosis with UTI. Admit for UTI with acidosis.       Clinical Indicators:metabolic  acidosis,increased confusion,pt pulling on lines and tubes    Treatment: IV flds and labs monitored      Please clarify and document your clinical opinion in the progress notes and discharge summary including the definitive and/or presumptive diagnosis, (suspected or probable), related to the above clinical findings. Please include clinical findings supporting your diagnosis. If you DECLINE this query or would like to communicate with [facility], please utilize the \"[employer] message box\" at the TOP of the Progress Note on the right.       Thank you,[facility]

## 2017-09-12 NOTE — PROGRESS NOTES
Infectious Disease Consultation Note    Requested by: dr. Fernando Dai    Reason: cystitis    Current abx Prior abx   trimeth-sulfa,  since 9/10/17 Ceftriaxone 9/8-9/10  Gentamicin 9/10     Lines:       Assessment :    80 y.o. female with pertinent PMHx of dementia, CHF, and HLD, sent to ed  from [address] on 9/8/17 with abnormal lab values, altered mental status. Clinical picture consistent with mrsa/proteus cystitis in a bedbound patient. Patient is clinically improving on current therapy  There a plans to switch her to outpatient hospice and hence, I would recommend to complete treatment with oral antibiotics    Recommendations:    1. Continue po bactrim till 9/14/17 (last dose 9/14 pm). Switch to liquid formulation to enhance compliance  2. Monitor creatinine while on bactrim. May have to discontinue it earlier if worsening creatinine. Advance Care planning: patient wasnt able to name a surrogate decision maker or provide an advance care plan     Above plan was discussed in details with rn and dr Fernando Dai. Please call me if any further questions or concerns. Will continue to participate in the care of this patient. subjective:      Detailed ros not feasible since patient unable to communicate effectively. home Medication List    Details   bisacodyl (DULCOLAX) 10 mg suppository Insert 10 mg into rectum daily as needed. Qty: 30 Suppository, Refills: 0      dicyclomine (BENTYL) 10 mg/5 mL soln oral solution Take 5 mL by mouth four (4) times daily. Qty: 473 mL, Refills: 0      erythromycin base (E-MYCIN) 250 mg tablet Take 1 Tab by mouth four (4) times daily. Qty: 90 Tab, Refills: 0      famotidine (PEPCID) 20 mg tablet Take 1 Tab by mouth daily. Qty: 30 Tab, Refills: 0      HYDROcodone-acetaminophen (NORCO) 5-325 mg per tablet Take 1 Tab by mouth every four (4) hours as needed. Max Daily Amount: 6 Tabs.   Qty: 40 Tab, Refills: 0      levothyroxine (SYNTHROID) 25 mcg tablet Take 1 Tab by mouth Daily (before breakfast). Qty: 30 Tab, Refills: 0      LORazepam (ATIVAN) 0.5 mg tablet Take 1 Tab by mouth three (3) times daily as needed. Max Daily Amount: 1.5 mg.  Qty: 90 Tab, Refills: 0      magnesium hydroxide (MILK OF MAGNESIA) 400 mg/5 mL suspension Take 30 mL by mouth daily as needed for Constipation. Qty: 1 Bottle, Refills: 0      ondansetron (ZOFRAN ODT) 4 mg disintegrating tablet Take 1 Tab by mouth every eight (8) hours as needed. Qty: 90 Tab, Refills: 0      polyethylene glycol (MIRALAX) 17 gram packet Take 1 Packet by mouth daily. Qty: 30 Packet, Refills: 0      risperiDONE (RISPERDAL) 0.25 mg tablet Take 1 Tab by mouth nightly. Qty: 30 Tab, Refills: 0      nitroglycerin (NITROSTAT) 0.4 mg SL tablet 1 Tab by SubLINGual route every five (5) minutes as needed for Chest Pain for 3 doses. Qty: 25 Tab, Refills: 0             Current Facility-Administered Medications   Medication Dose Route Frequency    dextrose 5% infusion  50 mL/hr IntraVENous CONTINUOUS    trimethoprim-sulfamethoxazole (BACTRIM, SEPTRA)  mg per tablet 1 Tab  1 Tab Oral Q12H    levothyroxine (SYNTHROID) tablet 25 mcg  25 mcg Oral ACB    famotidine (PEPCID) tablet 20 mg  20 mg Oral DAILY    risperiDONE (RisperDAL) tablet 0.25 mg  0.25 mg Oral QHS       Allergies: Ciprofloxacin    Temp (24hrs), Av.6 °F (36.4 °C), Min:97 °F (36.1 °C), Max:98 °F (36.7 °C)    Visit Vitals    /78 (BP 1 Location: Left arm)    Pulse (!) 104    Temp 97 °F (36.1 °C)    Resp 16    Ht 5' 1.02\" (1.55 m)    Wt 47.2 kg (104 lb)    SpO2 98%    Breastfeeding No    BMI 19.64 kg/m2       ROS: Detailed ros not feasible since patient unable to communicate effectively. Physical Exam:    Constitutional: No distress. Thin and frail   HENT:   Head: Normocephalic and atraumatic. Mouth/Throat: Oropharynx is clear and moist. Mucous membranes are dry  Eyes: Conjunctivae and EOM are normal. No scleral icterus. Left pupil is round. Right corneal opacity, left pupil is irregular   Neck: Trachea normal and normal range of motion. Neck supple. No JVD present. No thyromegaly present. Cardiovascular: Normal rate, regular rhythm, S1 normal and S2 normal.  Exam reveals no gallop and no friction rub. No murmur heard. Pulmonary/Chest: Effort normal and breath sounds normal. No accessory muscle usage. No respiratory distress. Abdominal: Soft. Normal appearance. She exhibits no distension. There is no tenderness. There is no rigidity, no rebound and no guarding. Musculoskeletal: Normal range of motion. She exhibits no edema or tenderness. Neurological: She is alert. She has normal strength. No cranial nerve deficit or sensory deficit. Coordination normal.   Oriented to person only, cooperative, moving all extremities spontaneously   Skin: Skin is warm and intact. No rash noted. Psychiatric: She has a normal mood and affect. Vitals reviewed. Labs: Results:   Chemistry Recent Labs      09/11/17   0354   GLU  96   NA  151*   K  5.0   CL  128*   CO2  15*   BUN  33*   CREA  1.08   CA  9.6   AGAP  8   BUCR  31*      CBC w/Diff No results for input(s): WBC, RBC, HGB, HCT, PLT, GRANS, LYMPH, EOS, HGBEXT, HCTEXT, PLTEXT, HGBEXT, HCTEXT, PLTEXT in the last 72 hours.    Microbiology Recent Labs      09/08/17 2045 09/08/17 2026 09/08/17 2010   CULT  >100,000 COLONIES/mL PROTEUS MIRABILIS*  >100,000 COLONIES/mL **METHICILLIN RESISTANT STAPHYLOCOCCUS AUREUS  MRSA CALLED TO AND CORRECTLY REPEATED BY:  Iva Pack RN 2S ON 9/10 AT 0958 TO JCK    NO GROWTH 3 DAYS  NO GROWTH 3 DAYS          RADIOLOGY:    All available imaging studies/reports in [facility] for this admission were reviewed    Dr. Hernan Camarillo, Infectious Disease Specialist  [phone]  September 12, 2017  8:41 AM

## 2017-09-12 NOTE — CDMP QUERY
Please clarify if this patient is being treated/managed for:    => Severe Protein Calorie Malnutrition    =>Other Explanation of clinical findings  =>Unable to Determine (no explanation of clinical findings)    The medical record reflects the following:    Risk:89 y.o. female who **is a resident of [facility]. Patient noted to be more confused than usual so sent to the ER. Patient found to have metabolic acidosis with UTI. Admit for UTI with acidosis.  appetite decreased BMI 19,DX of mod malnutrition  RD doc. Start Ensure Enlive TID with meals  - Start Ensure Pudding TID with meals.   - Continue RD inpatient monitoring and evaluation.      NUTRITION INTERVENTIONS & DIAGNOSIS:      Meals/Snacks: modified diet   Medical food supplementation   IV hydration        Nutrition Diagnosis: Unintentional weight loss related to limited PO intake as evidenced by 22 lb (18%) weight loss x 4 months.            Please clarify and document your clinical opinion in the progress notes and discharge summary including the definitive and/or presumptive diagnosis, (suspected or probable), related to the above clinical findings. Please include clinical findings supporting your diagnosis. If you DECLINE this query or would like to communicate with [facility], please utilize the \"[facility] message box\" at the TOP of the Progress Note on the right.       Thank you,[facility]

## 2017-09-14 LAB
BACTERIA SPEC CULT: NORMAL
BACTERIA SPEC CULT: NORMAL
SERVICE CMNT-IMP: NORMAL
SERVICE CMNT-IMP: NORMAL

## 2023-08-09 NOTE — ED PROVIDER NOTES
"Palliative Care Daily Progress Note     S: Medical record reviewed, followed up with Primary RN Clraa and Dr Cifuentes regarding patient's condition. When Palliative entered the room Lashell was awake and alert to person and hospital, not time or why she was here. She was mad because they wanted to take her down for a scope and she stated it was her body and she should be able to choose what happen to it. She denied pain, nausea, anxiety, nausea or short of breath, states I just want to go home.      O:   Palliative Performance Scale Score:     /82   Pulse 81   Temp 97.9 øF (36.6 øC) (Axillary)   Resp 18   Ht 167.6 cm (66\")   Wt 44 kg (96 lb 14.4 oz)   SpO2 97%   BMI 15.64 kg/mý     Intake/Output Summary (Last 24 hours) at 8/9/2023 1638  Last data filed at 8/9/2023 1400  Gross per 24 hour   Intake 0 ml   Output --   Net 0 ml       PE:  P t was awake and alert oriented to person and place not time,  she appears thin and frail, NAD, her respirations were unlabored, no wheezing or rhonchi noted. Sh ehad irregular rhythm HR 70's per documentation, She was agitated as she did not want to get scope done, She was awake and alert to self and hospital however she does not understand her medical condition and does not have capacity to make decisions.      Meds: Reviewed and changes noted    Labs:   Results from last 7 days   Lab Units 08/09/23  0030   WBC 10*3/mm3 4.89   HEMOGLOBIN g/dL 7.8*   HEMATOCRIT % 23.9*   PLATELETS 10*3/mm3 204     Results from last 7 days   Lab Units 08/09/23  0030   SODIUM mmol/L 128*   POTASSIUM mmol/L 3.7   CHLORIDE mmol/L 99   CO2 mmol/L 22.3   BUN mg/dL 14   CREATININE mg/dL 0.67   GLUCOSE mg/dL 120*   CALCIUM mg/dL 7.5*     Results from last 7 days   Lab Units 08/09/23  0030 08/08/23  0217 08/07/23  0405   SODIUM mmol/L 128*   < > 134*   POTASSIUM mmol/L 3.7   < > 3.4*   CHLORIDE mmol/L 99   < > 100   CO2 mmol/L 22.3   < > 21.0*   BUN mg/dL 14   < > 25*   CREATININE mg/dL 0.67   < " HPI Comments: 11:51 AM Mely Gonzales is a 80 y.o. female with a h/o acute heart failure, urinary incontinence, and arthritis who presents to the ED via EMS for chronic abd pain. Pt states she has had abd pain for \"years\" but today it is worse. Pain has no specific focal point. Pt takes tramadol 1-2 x a day for pain. Home health nurse has not seen pt since Friday, unsure of when last mediation was. Pt last BM was Sunday or Monday. Pt has nausea, but no vomiting. Pt appetite is lessened due to abd pain afterwards. Pt is also reporting dizziness. Pt states she \"can't hold her water\". 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 Home health nurse states pt minds is intermittently good. PCP: Adriane Sky MD      The history is provided by the patient. Past Medical History:   Diagnosis Date    Acute diastolic heart failure (HCC)     Arthritis     Chest pain, unspecified     Noncardiac likely    Hyperlipidemia     Mitral valve disorders     Nocturia     Pneumonia     Transfusion history     No history of receiving blood or blood product transfusion(s). 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     Unspecified urinary incontinence     Urinary tract infection, site not specified        Past Surgical History:   Procedure Laterality Date    ABDOMEN SURGERY PROC UNLISTED      intestines, stricture    HX CHOLECYSTECTOMY      HX HEMORRHOIDECTOMY           Family History:   Problem Relation Age of Onset    Heart Attack Father 72    Sudden Death Neg Hx        Social History     Social History    Marital status:      Spouse name: N/A    Number of children: N/A    Years of education: N/A     Occupational History    Not on file.      Social History Main Topics    Smoking status: Former Smoker     Quit date: 10/18/2002    Smokeless tobacco: Never Used    Alcohol use No    Drug use: No    Sexual activity: Not > 0.97   CALCIUM mg/dL 7.5*   < > 8.2*   BILIRUBIN mg/dL  --   --  0.7   ALK PHOS U/L  --   --  100   ALT (SGPT) U/L  --   --  9   AST (SGOT) U/L  --   --  19   GLUCOSE mg/dL 120*   < > 123*    < > = values in this interval not displayed.     Imaging Results (Last 72 Hours)       Procedure Component Value Units Date/Time    CT Head Without Contrast [[id number]] Collected: 08/06/23 1947     Updated: 08/06/23 1952    Narrative:      EXAMINATION:Computed tomography(CT)of the head without IV contrast     TECHNIQUE:CT of the head was performed in the supine position without  intravenous contrast according to as low as reasonably achievable dose  protocol. Axial CT utilized with slice thickness of 5 mm presented in  soft tissue and bone algorithms.     COMPARISON:No prior studies are available for review at the time of this  dictation.     FINDINGS: Moderate parenchymal volume loss is noted.  No acute intra-or extra-axial hemorrhage or fluid collections are  identified. The ventricles are of normal size,shape,and morphology. The  basilar cisterns are patent. No mass effect or midline shift is seen.  The gray-white matter differentiation is normal. The visualized portions  of the orbits,paranasal sinuses,and mastoids appear normal. No acute  fracture is identified.       Impression:         No acute intracranial hemorrhage,midline shift,or significant mass  effect.     This report was finalized on 8/6/2023 7:50 PM by Mary Gomez MD.                 Diagnostics: Reviewed    A: Lashell Case is a 86 y.o. female admitted on 8/6/2023 due to shortness of breath, hypoglycemia and altered mental status, she was also found to have bright red blood in her stool. Lashell has a medical history of dementia,  hypertension, hyperlipidemia, hypothyroidism, arthritis, atrial fibrillation chronically anticoagulated with Eliquis. Per notes, family states that Lashell has been declining for the last 2-3 months and has been less  Currently     Other Topics Concern    Not on file     Social History Narrative         ALLERGIES: Ciprofloxacin    Review of Systems   Constitutional: Negative for diaphoresis and fever. HENT: Negative for ear pain, rhinorrhea and trouble swallowing. Eyes: Negative for visual disturbance. Respiratory: Negative for cough and shortness of breath. Cardiovascular: Negative for chest pain and leg swelling. Gastrointestinal: Positive for abdominal pain and nausea. Negative for blood in stool, diarrhea and vomiting. Genitourinary: Negative for difficulty urinating, flank pain and hematuria. Musculoskeletal: Negative for back pain and neck pain. Skin: Negative for rash. Neurological: Positive for dizziness. Negative for weakness, numbness and headaches. Hematological: Negative. Psychiatric/Behavioral: Negative. All other systems reviewed and are negative. Vitals:    05/16/17 1500 05/16/17 1600 05/16/17 1700 05/16/17 1800   BP: 175/85 151/86 145/61 155/77   Pulse:  (P) 78 (P) 80 (P) 82   Resp: (P) 18 (P) 18 (P) 18 (P) 18   Temp: (P) 97.7 °F (36.5 °C) (P) 97.1 °F (36.2 °C) (P) 97.2 °F (36.2 °C) (P) 98.1 °F (36.7 °C)   SpO2: 100% 100% 100% 100%            Physical Exam   Constitutional: She is oriented to person, place, and time. She appears well-developed and well-nourished. Non-toxic appearance. She does not have a sickly appearance. She does not appear ill. No distress. HENT:   Head: Normocephalic and atraumatic. Mouth/Throat: Oropharynx is clear and moist. No oropharyngeal exudate. Eyes: Conjunctivae and EOM are normal. No scleral icterus. Right pupil is not round and not reactive. Right sided blindness    Neck: Trachea normal and normal range of motion. Neck supple. No hepatojugular reflux and no JVD present. No tracheal deviation present. No thyromegaly present.    Cardiovascular: Normal rate, regular rhythm, S1 normal, S2 normal, normal heart sounds, intact distal pulses and mobile, decreased oral intake, increased confusion, and has become incontinent. Dr Cifuentes had discussion with granddaughter this am and per her report Lashell has been declining for longer than the last several months, they had to take her keys around 10 months ago as well. Lashell had been living at home alone, with her family checking on her daily, however family felt that they would need assistance at home, home health, or even short term rehab. Palliative consulted to discuss Goals of care and advanced care planning.         P:  I was able to have a lengthy conversation with patients granddaughter Jayleen today regarding what she felt Lashell's goals for her care would be as she does not have capacity to make those decisions. I had spoken with Lashell's daughter Hattie yesterday afternoon and she wanted me to talk with Jayleen, however Hattie did say that she would not want her mother to be resuscitated or put on the vent as she felt she would not want this. Jayleen stated that she had discussed it with her mother last night and they had both agreed that Lashell would not want to be resuscitated or to be put on a vent. Jayleen states that they still want nursing facility placement as Lashell is unable to be home alone and that they are unable to provide 24 hr care, she also spoke with Lynne in [facility] today. I changed her code status to dnr/dni and let MARIANNA Elizabeth and Dr Cifuentes know of conversation and change. Jayleen does say that she plans to go get emergency guardianship in the am.    We will continue to follow along. Please do not hesitate to contact us regarding further sx mgmt or GOC needs, including after hours or on weekends via our on call provider at [phone].     Ligia Gates, APRN    8/9/2023   normal pulses. Exam reveals no gallop, no S3 and no S4. No murmur heard. Pulses:       Radial pulses are 2+ on the right side, and 2+ on the left side. Dorsalis pedis pulses are 2+ on the right side, and 2+ on the left side. Pulmonary/Chest: Effort normal and breath sounds normal. No accessory muscle usage. No respiratory distress. She has no decreased breath sounds. She has no wheezes. She has no rhonchi. She has no rales. Abdominal: Soft. Normal appearance and bowel sounds are normal. She exhibits no distension, no fluid wave, no ascites and no mass. There is no hepatosplenomegaly. There is tenderness in the epigastric area. There is no rigidity, no rebound, no guarding, no CVA tenderness, no tenderness at McBurney's point and negative Hayes's sign. Musculoskeletal: Normal range of motion. She exhibits no edema or tenderness. Strength 4/5 throughout    Lymphadenopathy:        Head (right side): No submental, no submandibular, no preauricular and no occipital adenopathy present. Head (left side): No submental, no submandibular, no preauricular and no occipital adenopathy present. She has no cervical adenopathy. Right: No supraclavicular adenopathy present. Left: No supraclavicular adenopathy present. Neurological: She is alert and oriented to person, place, and time. She has normal strength and normal reflexes. She is not disoriented. No cranial nerve deficit or sensory deficit. Coordination and gait normal. GCS eye subscore is 4. GCS verbal subscore is 5. GCS motor subscore is 6. Grossly intact    Skin: Skin is warm, dry and intact. No rash noted. She is not diaphoretic. Psychiatric: She has a normal mood and affect. Her speech is normal and behavior is normal. Judgment and thought content normal. Cognition and memory are normal.   Nursing note and vitals reviewed.        MDM  Number of Diagnoses or Management Options  Acute cystitis without hematuria: Leukopenia, unspecified type:   Diagnosis management comments: UTI  Constipation   DDX: Gastritis, gerd, peptic ulcer disease, cholecystitis, pancreatitis, gastroenteritis, hepatitis, constipation related pain, appendicitis pain, diverticulitis, urinary tract infection, obstruction, abdominal wall pain, atypical cardiac (ami or anginal pain), referred pain from pulmonary process (pneumonia, empyema),  or combination of the above versus many other processes. ED Course       Procedures      Vitals:  Patient Vitals for the past 12 hrs:   Temp Pulse Resp BP SpO2   05/16/17 1800 (P) 98.1 °F (36.7 °C) (P) 82 (P) 18 155/77 100 %   05/16/17 1700 (P) 97.2 °F (36.2 °C) (P) 80 (P) 18 145/61 100 %   05/16/17 1600 (P) 97.1 °F (36.2 °C) (P) 78 (P) 18 151/86 100 %   05/16/17 1500 (P) 97.7 °F (36.5 °C) - (P) 18 175/85 100 %   05/16/17 1400 (P) 98.2 °F (36.8 °C) - - - -   05/16/17 1200 (P) 98.1 °F (36.7 °C) (P) 80 (P) 18 (P) 155/76 -   05/16/17 1113 97.8 °F (36.6 °C) 60 18 132/78 -   05/16/17 1054 97.1 °F (36.2 °C) 61 18 132/73 100 %     Labs essentially normal with the exception of WBC 2.9, HG 9.7, this is chronic, UA showed a trace of leukocytes and potassium is 3.  2:38 PM 5/16/2017    Consult:  Discussed care with Dr. Blue Yi; Standard discussion; including history of patients chief complaint, available diagnostic results, and treatment course. Agrees to admission and accepts pt. Disposition: Admit  Diagnosis:   1. Leukopenia, unspecified type    2. Acute cystitis without hematuria    3. Hypokalemia    4. Abdominal pain, epigastric          Scribe Attestation:     Mallorie STARKS scribing for and in the presence of Henry Macdonald,  May 16, 2017     Signed by:  Kaelyn Hicks, May 16, 2017 at 6:48 PM     Physician Attestation:   I personally performed the services described in this documentation, reviewed and edited the documentation which was dictated to the kaelyn in my presence, and it accurately records my words and actions.  [address] Desai, DO  May 16, 2017

## 2024-02-22 NOTE — IP AVS SNAPSHOT
Carli Breeding 
 
 
 [address] Patient: Annamarie Antunez MRN: [id number] You are allergic to the following Allergen Reactions Ciprofloxacin Itching Recent Documentation Height Weight Breastfeeding? BMI Smoking Status 1.55 m 47.2 kg No 19.64 kg/m2 Former Smoker Unresulted Labs Order Current Status CULTURE, BLOOD Preliminary result CULTURE, BLOOD Preliminary result Emergency Contacts Name Discharge Info Relation Home Work Mobile Gabriela Mars 8303 CAREGIVER [3] Other Relative [6] [id number] Jinny 705 DISCHARGE CAREGIVER [3] Other Relative [6] [phone] About your hospitalization You were admitted on:  2017 You last received care in the[address] You were discharged on:  2017 Unit phone number:  [phone] Why you were hospitalized Your primary diagnosis was:  Not on File Your diagnoses also included:  Dementia, Uti (Urinary Tract Infection), Dehydration, Metabolic Acidosis, Normal Anion Gap ([facility] [place]), Hypernatremia, Sepsis Secondary To Uti ([facility]) Providers Seen During Your Hospitalizations Provider Role Specialty Primary office phone Leann Hoskins MD Attending Provider Emergency Medicine [phone] Petra Hollis MD Attending Provider [facility] [phone] Andrew Baum MD Attending Provider [facility] [phone] Your Primary Care Physician (PCP) Primary Care Physician Office Phone Office Fax [phone] [facility], [address] [place] [address] [phone] Follow-up Information Follow up With Details Comments Contact Info Andrew Baum MD In 1 day  [facility] [address] 
[phone] Current Discharge Medication List  
  
 Inpatient Nutrition Evaluation    Admit Date: 2/21/2024   Total duration of encounter: 1 day   Patient Age: 70 y.o.    Nutrition Recommendation/Prescription     Continue heart healthy diet.     Nutrition Assessment     Chart Review    Reason Seen: continuous nutrition monitoring and length of stay    Malnutrition Screening Tool Results   Have you recently lost weight without trying?: No  Have you been eating poorly because of a decreased appetite?: No   MST Score: 0   Diagnosis:  LOVELY, Acute ETOH intoxication  Chronic thrombocytopenia  AF  Severe AS    Relevant Medical History:   Atrial flutter       CHF (congestive heart failure)      Coronary artery disease      Hyperlipidemia      Hypertension      Myocardial infarction      SOB (shortness of breath)       at times       Scheduled Medications:  ALPRAZolam, 0.25 mg, TID  [START ON 2/23/2024] ALPRAZolam, 0.25 mg, BID  amiodarone, 200 mg, Daily  apixaban, 5 mg, BID  aspirin, 81 mg, Daily  atorvastatin, 40 mg, QHS  ferrous sulfate, 1 tablet, Daily  folic acid, 1 mg, Daily  [START ON 2/26/2024] furosemide, 20 mg, Daily  [START ON 2/23/2024] metoprolol succinate, 50 mg, Daily  [START ON 2/26/2024] QUEtiapine, 200 mg, QHS  sacubitriL-valsartan, 1 tablet, BID  thiamine, 100 mg, Daily  white petrolatum, , BID    Continuous Infusions:   PRN Medications: acetaminophen, albuterol-ipratropium, aluminum-magnesium hydroxide-simethicone, bisacodyL, glucagon (human recombinant), glucose, glucose, HYDROcodone-acetaminophen, melatonin, morphine, naloxone, ondansetron, ondansetron, polyethylene glycol, simethicone, sodium chloride 0.9%    Recent Labs   Lab 02/21/24  1330 02/22/24  0440   * 139   K 4.5 4.5   CALCIUM 8.4* 8.1*   MG  --  2.30   CHLORIDE 102 109*   CO2 19* 21*   BUN 57.8* 49.5*   CREATININE 2.32* 1.62*   EGFRNORACEVR 29 45   GLUCOSE 111 94   BILITOT 1.2  --    ALKPHOS 59  --    ALT 9  --    AST 14  --    ALBUMIN 3.1*  --    WBC 5.80 3.59*   HGB 8.9* 8.4*   HCT  "28.7* 27.9*     Nutrition Orders:  Diet heart healthy      Appetite/Oral Intake: fair/  Factors Affecting Nutritional Intake: decreased appetite  Food/Anglican/Cultural Preferences: none reported  Food Allergies: none reported  Last Bowel Movement: 02/20/24  Wound(s):      Comments    Pt ate some of his breakfast this am. Pt ate few bites eggs, bite of pancake. Drank coffee, juice, and milk. Discussed food preferences. Pt likes pancakes. Will continue to monitor. Decline to drink ONS.     Anthropometrics    Height: 6' 2" (188 cm), Height Method: Stated  Last Weight: 79.7 kg (175 lb 11.3 oz) (02/21/24 1715), Weight Method: Bed Scale  BMI (Calculated): 22.5  BMI Classification: normal (BMI 18.5-24.9)        Ideal Body Weight (IBW), Male: 190 lb     % Ideal Body Weight, Male (lb): 92.48 %                          Usual Weight Provided By: unable to obtain usual weight    Wt Readings from Last 5 Encounters:   02/21/24 79.7 kg (175 lb 11.3 oz)   01/10/24 76.7 kg (169 lb 1.6 oz)   12/25/23 71.6 kg (157 lb 13.6 oz)   11/25/23 73.9 kg (163 lb)   11/24/23 79.4 kg (175 lb)     Weight Change(s) Since Admission:   Wt Readings from Last 1 Encounters:   02/21/24 1715 79.7 kg (175 lb 11.3 oz)   02/21/24 1311 77.1 kg (170 lb)   Admit Weight: 77.1 kg (170 lb) (02/21/24 1311), Weight Method: Stated    Patient Education     Not applicable.    Nutrition Goals & Monitoring     Dietitian will monitor: food and beverage intake, weight, weight change, electrolyte/renal panel, glucose/endocrine profile, and gastrointestinal profile    Nutrition Risk/Follow-Up: low (follow-up in 5-7 days)  Patients assigned 'low nutrition risk' status do not qualify for a full nutritional assessment but will be monitored and re-evaluated in a 5-7 day time period. Please consult if re-evaluation needed sooner.  " START taking these medications Dose & Instructions Dispensing Information Comments Morning Noon Evening Bedtime  
 trimethoprim-sulfamethoxazole  mg per tablet Commonly known as:  Paige Ramos Your last dose was: Your next dose is:    
   
   
 Dose:  1 Tab Take 1 Tab by mouth every twelve (12) hours for 3 days. Quantity:  5 Tab Refills:  0 CONTINUE these medications which have NOT CHANGED Dose & Instructions Dispensing Information Comments Morning Noon Evening Bedtime  
 bisacodyl 10 mg suppository Commonly known as:  DULCOLAX Your last dose was: Your next dose is:    
   
   
 Dose:  10 mg Insert 10 mg into rectum daily as needed. Quantity:  30 Suppository Refills:  0  
     
   
   
   
  
 erythromycin base 250 mg tablet Commonly known as:  E-MYCIN Your last dose was: Your next dose is:    
   
   
 Dose:  250 mg Take 1 Tab by mouth four (4) times daily. Quantity:  90 Tab Refills:  0  
     
   
   
   
  
 famotidine 20 mg tablet Commonly known as:  PEPCID Your last dose was: Your next dose is:    
   
   
 Dose:  20 mg Take 1 Tab by mouth daily. Quantity:  30 Tab Refills:  0 HYDROcodone-acetaminophen 5-325 mg per tablet Commonly known as:  Vesna Burger Your last dose was: Your next dose is:    
   
   
 Dose:  1 Tab Take 1 Tab by mouth every four (4) hours as needed. Max Daily Amount: 6 Tabs. Quantity:  40 Tab Refills:  0  
     
   
   
   
  
 levothyroxine 25 mcg tablet Commonly known as:  SYNTHROID Your last dose was: Your next dose is:    
   
   
 Dose:  25 mcg Take 1 Tab by mouth Daily (before breakfast). Quantity:  30 Tab Refills:  0 LORazepam 0.5 mg tablet Commonly known as:  ATIVAN Your last dose was:     
   
Your next dose is:    
   
   
 Dose:  0.5 mg  
 Take 1 Tab by mouth three (3) times daily as needed. Max Daily Amount: 1.5 mg.  
 Quantity:  90 Tab Refills:  0  
     
   
   
   
  
 magnesium hydroxide 400 mg/5 mL suspension Commonly known as:  MILK OF MAGNESIA Your last dose was: Your next dose is:    
   
   
 Dose:  30 mL Take 30 mL by mouth daily as needed for Constipation. Quantity:  1 Bottle Refills:  0  
     
   
   
   
  
 nitroglycerin 0.4 mg SL tablet Commonly known as:  NITROSTAT Your last dose was: Your next dose is:    
   
   
 Dose:  0.4 mg  
1 Tab by SubLINGual route every five (5) minutes as needed for Chest Pain for 3 doses. Quantity:  25 Tab Refills:  0  
     
   
   
   
  
 ondansetron 4 mg disintegrating tablet Commonly known as:  ZOFRAN ODT Your last dose was: Your next dose is:    
   
   
 Dose:  4 mg Take 1 Tab by mouth every eight (8) hours as needed. Quantity:  90 Tab Refills:  0  
     
   
   
   
  
 polyethylene glycol 17 gram packet Commonly known as:  Sudha Hodgkin Your last dose was: Your next dose is:    
   
   
 Dose:  17 g Take 1 Packet by mouth daily. Quantity:  30 Packet Refills:  0  
     
   
   
   
  
 risperiDONE 0.25 mg tablet Commonly known as:  RisperDAL Your last dose was: Your next dose is:    
   
   
 Dose:  0.25 mg Take 1 Tab by mouth nightly. Quantity:  30 Tab Refills:  0 STOP taking these medications   
 dicyclomine 10 mg/5 mL Soln oral solution Commonly known as:  BENTYL Where to Get Your Medications Information on where to get these meds will be given to you by the nurse or doctor. ! Ask your nurse or doctor about these medications  
  trimethoprim-sulfamethoxazole  mg per tablet Discharge Instructions Urinary Tract Infection in Women: Care Instructions Your Care Instructions A urinary tract infection, or UTI, is a general term for an infection anywhere between the kidneys and the urethra (where urine comes out). Most UTIs are bladder infections. They often cause pain or burning when you urinate. UTIs are caused by bacteria and can be cured with antibiotics. Be sure to complete your treatment so that the infection goes away. Follow-up care is a key part of your treatment and safety. Be sure to make and go to all appointments, and call your doctor if you are having problems. It's also a good idea to know your test results and keep a list of the medicines you take. How can you care for yourself at home? · Take your antibiotics as directed. Do not stop taking them just because you feel better. You need to take the full course of antibiotics. · Drink extra water and other fluids for the next day or two. This may help wash out the bacteria that are causing the infection. (If you have kidney, heart, or liver disease and have to limit fluids, talk with your doctor before you increase your fluid intake.) · Avoid drinks that are carbonated or have caffeine. They can irritate the bladder. · Urinate often. Try to empty your bladder each time. · To relieve pain, take a hot bath or lay a heating pad set on low over your lower belly or genital area. Never go to sleep with a heating pad in place. To prevent UTIs · Drink plenty of water each day. This helps you urinate often, which clears bacteria from your system. (If you have kidney, heart, or liver disease and have to limit fluids, talk with your doctor before you increase your fluid intake.) · Urinate when you need to. · Urinate right after you have sex. · Change sanitary pads often. · Avoid douches, bubble baths, feminine hygiene sprays, and other feminine hygiene products that have deodorants. · After going to the bathroom, wipe from front to back. When should you call for help? Call your doctor now or seek immediate medical care if: · Symptoms such as fever, chills, nausea, or vomiting get worse or appear for the first time. · You have new pain in your back just below your rib cage. This is called flank pain. · There is new blood or pus in your urine. · You have any problems with your antibiotic medicine. Watch closely for changes in your health, and be sure to contact your doctor if: 
· You are not getting better after taking an antibiotic for 2 days. · Your symptoms go away but then come back. Where can you learn more? Go to http://abner-gabino.info/. Enter P779 in the search box to learn more about \"Urinary Tract Infection in Women: Care Instructions. \" Current as of: November 28, 2016 Content Version: 11.3 © 8539-4539 [employer]. Care instructions adapted under license by Us[employer] (which disclaims liability or warranty for this information). If you have questions about a medical condition or this instruction, always ask your healthcare professional. [place] [address] any warranty or liability for your use of this information. Patient armband removed and shredded MyChart Activation Thank you for requesting access to Innoz. Please follow the instructions below to securely access and download your online medical record. Innoz allows you to send messages to your doctor, view your test results, renew your prescriptions, schedule appointments, and more. How Do I Sign Up? 1. In your internet browser, go to [email or web address] 
2. Click on the First Time User? Click Here link in the Sign In box. You will be redirect to the New Member Sign Up page. 3. Enter your Innoz Access Code exactly as it appears below. You will not need to use this code after youve completed the sign-up process. If you do not sign up before the expiration date, you must request a new code. Kamegot Access Code: Activation code not generated Current KiteReaders Status: Patient Declined (This is the date your KiteReaders access code will ) 4. Enter the last four digits of your Social Security Number (xxxx) and Date of Birth (mm/dd/yyyy) as indicated and click Submit. You will be taken to the next sign-up page. 5. Create a KiteReaders ID. This will be your KiteReaders login ID and cannot be changed, so think of one that is secure and easy to remember. 6. Create a KiteReaders password. You can change your password at any time. 7. Enter your Password Reset Question and Answer. This can be used at a later time if you forget your password. 8. Enter your e-mail address. You will receive e-mail notification when new information is available in [address] 9. Click Sign Up. You can now view and download portions of your medical record. 10. Click the Download Summary menu link to download a portable copy of your medical information. Additional Information If you have questions, please visit the Frequently Asked Questions section of the KiteReaders website at [email or web address] Remember, KiteReaders is NOT to be used for urgent needs. For medical emergencies, dial 911. DISCHARGE SUMMARY from Nurse The following personal items are in your possession at time of discharge: 
 
Dental Appliances: Lowers, Uppers, Sent home Visual Aid: None Home Medications: None Jewelry: None Clothing: None Other Valuables: None Personal Items Sent to Safe: None PATIENT INSTRUCTIONS: 
 
 
F-face looks uneven A-arms unable to move or move unevenly S-speech slurred or non-existent T-time-call 911 as soon as signs and symptoms begin-DO NOT go Back to bed or wait to see if you get better-TIME IS BRAIN. Warning Signs of HEART ATTACK Call 911 if you have these symptoms: 
? Chest discomfort. Most heart attacks involve discomfort in the center of the chest that lasts more than a few minutes, or that goes away and comes back. It can feel like uncomfortable pressure, squeezing, fullness, or pain. ? Discomfort in other areas of the upper body. Symptoms can include pain or discomfort in one or both arms, the back, neck, jaw, or stomach. ? Shortness of breath with or without chest discomfort. ? Other signs may include breaking out in a cold sweat, nausea, or lightheadedness. Don't wait more than five minutes to call [address]! Fast action can save your life. Calling 911 is almost always the fastest way to get lifesaving treatment. Emergency Medical Services staff can begin treatment when they arrive  up to an hour sooner than if someone gets to the hospital by car. The discharge information has been reviewed with the patient. The patient verbalized understanding. Discharge medications reviewed with the patient and appropriate educational materials and side effects teaching were provided. Patient Discharge Instructions Kentrell Christianson / [id number] : 1917 Admitted 2017 Discharged: 2017 · It is important that you take the medication exactly as they are prescribed. · Keep your medication in the bottles provided by the pharmacist and keep a list of the medication names, dosages, and times to be taken with you at all times. · Do not take other medications without consulting your doctor. Recommended Diet: Comfort feeding, Resume previous diet and Ensure enlive and Ensure Pudding TID with meals Recommended Activity: PT/OT Eval and Treat Discharge to comfort care, consult hospice. Follow up with PCM for fevers, chest pain, cough, shortness of breath. Signed By: Tatiana Jaime MD   
 September 12, 2017 Discharge Instructions Attachments/References MRSA (ENGLISH) SULFAMETHOXAZOLE/TRIMETHOPRIM (BY MOUTH) (ENGLISH) Discharge Orders None [facility] Announcement We are excited to announce that we are making your provider's discharge notes available to you in MAG Interactivehart. You will see these notes when they are completed and signed by the physician that discharged you from your recent hospital stay. If you have any questions or concerns about any information you see in MAG Interactivehart, please call the Health Information Department where you were seen or reach out to your Primary Care Provider for more information about your plan of care. General Information Please provide this summary of care documentation to your next provider. Patient Signature:  ____________________________________________________________ Date:  ____________________________________________________________  
  
Kalin Jimenez Provider Signature:  ____________________________________________________________ Date:  ____________________________________________________________ More Information MRSA: Care Instructions Your Care Instructions MRSA stands for methicillin-resistant Staphylococcus aureus. It is a type of bacteria that can cause a staph infection. But it cannot be killed by the antibiotic methicillin and some other antibiotics. This sometimes makes it harder to treat. The bacteria are widespread on skin and in the nose. MRSA can cause infections of the skin, heart, blood, and bones. The bacteria can spread quickly in the body and cause serious problems. MRSA can also be spread from person to person.  
Depending on how serious your infection is, the doctor may drain your wound and you may get antibiotics through a small tube placed in a vein (IV). Your doctor may also give you an antibiotic ointment to use on sores or in your nose. Follow-up care is a key part of your treatment and safety. Be sure to make and go to all appointments, and call your doctor if you are having problems. It's also a good idea to know your test results and keep a list of the medicines you take. How can you care for yourself at home? · Take your antibiotics as directed. Do not stop taking them just because you feel better. You need to take the full course of antibiotics. · Keep any cuts or other wounds covered while they heal. 
· Wash your hands often, especially after you touch elastic bandages or other dressings over a wound. This can keep the bacteria from spreading. Wrap bandages in a plastic bag before you throw them away. · Do not share towels, washcloths, razors, clothing, or other items that touched your wound or bandage. Wash your sheets, towels, and clothes with warm water and detergent. Dry them in a hot dryer, if possible. · Keep shared areas clean by wiping down surfaces (such as countertops, doorknobs, and light switches) with a disinfectant. When should you call for help? Call 911 anytime you think you may need emergency care. For example, call if: 
· You passed out (lost consciousness). Call your doctor now or seek immediate medical care if: 
· You have a cut or sore that is not healing. · Your infection is not going away after several days of treatment. · Your infection gets worse. · You get a fever, or your fever gets worse. Watch closely for changes in your health, and be sure to contact your doctor if: 
· You do not get better as expected. Where can you learn more? Go to http://abner-gabino.info/. Enter L530 in the search box to learn more about \"MRSA: Care Instructions. \" Current as of: March 3, 2017 Content Version: 11.3 © 0367-9363 [employer]. Care instructions adapted under license by [employer] (which disclaims liability or warranty for this information). If you have questions about a medical condition or this instruction, always ask your healthcare professional. [address] any warranty or liability for your use of this information. Sulfamethoxazole/Trimethoprim (By mouth) Sulfamethoxazole (sul-fa-meth-OX-a-zole), Trimethoprim (trye-METH-oh-prim) Treats or prevents infections. Brand Name(s): Bactrim, Bactrim DS, SMZ-TMP Pediatric, Sulfatrim, Sulfatrim Pediatric There may be other brand names for this medicine. When This Medicine Should Not Be Used: This medicine is not right for everyone. Do not use it if you had an allergic reaction to trimethoprim, sulfamethoxazole, or any sulfa drug. Do not use this medicine if you are pregnant, if you have anemia caused by low levels of folic acid, or if you have a history of drug-induced thrombocytopenia. How to Use This Medicine:  
Liquid, Tablet · Your doctor will tell you how much medicine to use. Do not use more than directed. · Measure the oral liquid medicine with a marked measuring spoon, oral syringe, or medicine cup. · Drink extra fluids so you will urinate more often and help prevent kidney problems. · Take all of the medicine in your prescription to clear up your infection, even if you feel better after the first few doses. · Missed dose: Take a dose as soon as you remember. If it is almost time for your next dose, wait until then and take a regular dose. Do not take extra medicine to make up for a missed dose. · Store the medicine in a closed container at room temperature, away from heat, moisture, and direct light. Do not freeze the oral liquid. Drugs and Foods to Avoid: Ask your doctor or pharmacist before using any other medicine, including over-the-counter medicines, vitamins, and herbal products. · Some medicines can affect how this medicine works. Tell your doctor if you also use the following:  
¨ amantadine, cyclosporine, digoxin, indomethacin, memantine, methotrexate, phenytoin, pyrimethamine, or warfarin ¨ an ACE inhibitor, diabetes medicine (glipizide, glyburide, metformin, pioglitazone, repaglinide, rosiglitazone), a diuretic (water pill, such as hydrochlorothiazide), or a tricyclic antidepressant Warnings While Using This Medicine: · It is not safe to take this medicine during pregnancy. It could harm an unborn baby. Tell your doctor right away if you become pregnant. · Tell your doctor if you are breastfeeding, or if you have kidney disease, liver disease, diabetes, malabsorption or malnutrition, folate deficiency, porphyria, thyroid problems, or a history of alcoholism. Tell your doctor if you have asthma or severe allergies, especially if you are allergic to any medicines. It is important for your doctor to know if you have HIV or AIDS, because this medicine might work differently for you. · This medicine may cause a severe allergic reaction. · This medicine may lower the number of platelets in your body, which are necessary for proper blood clotting. This may cause you to bleed or get infections more easily. Talk with your doctor if you have concerns about this. · This medicine can cause diarrhea. Call your doctor if the diarrhea becomes severe, does not stop, or is bloody. Do not take any medicine to stop diarrhea until you have talked to your doctor. Diarrhea can occur 2 months or more after you stop taking this medicine. · Tell any doctor or dentist who treats you that you are using this medicine. This medicine may affect certain medical test results. · Your doctor will do lab tests at regular visits to check on the effects of this medicine. Keep all appointments. · Keep all medicine out of the reach of children. Never share your medicine with anyone. Possible Side Effects While Using This Medicine:  
Call your doctor right away if you notice any of these side effects: · Allergic reaction: Itching or hives, swelling in your face or hands, swelling or tingling in your mouth or throat, chest tightness, trouble breathing · Blistering, peeling, or red skin rash · Dark urine or pale stools, nausea, vomiting, loss of appetite, stomach pain, yellow skin or eyes · Chest pain, cough, or trouble breathing · Confusion, weakness · Muscle twitching · Severe diarrhea, stomach pain, cramps, bloating · Skin rash, purple spots on your skin, or very pale or yellow skin · Sore throat, fever, muscle pain · Uneven heartbeat, numbness or tingling in your hands, feet, or lips · Unusual bleeding, bruising, or weakness If you notice these less serious side effects, talk with your doctor: · Mild nausea, vomiting, or loss of appetite If you notice other side effects that you think are caused by this medicine, tell your doctor. Call your doctor for medical advice about side effects. You may report side effects to FDA at 7-039-FDA-3685 © 2017 [facility] Information is for End User's use only and may not be sold, redistributed or otherwise used for commercial purposes. The above information is an  only. It is not intended as medical advice for individual conditions or treatments. Talk to your doctor, nurse or pharmacist before following any medical regimen to see if it is safe and effective for you.